# Patient Record
Sex: FEMALE | Race: WHITE | NOT HISPANIC OR LATINO | Employment: FULL TIME | URBAN - METROPOLITAN AREA
[De-identification: names, ages, dates, MRNs, and addresses within clinical notes are randomized per-mention and may not be internally consistent; named-entity substitution may affect disease eponyms.]

---

## 2017-06-15 ENCOUNTER — EH NON-PROVIDER (OUTPATIENT)
Dept: OCCUPATIONAL MEDICINE | Facility: CLINIC | Age: 27
End: 2017-06-15

## 2017-06-15 ENCOUNTER — HOSPITAL ENCOUNTER (OUTPATIENT)
Facility: MEDICAL CENTER | Age: 27
End: 2017-06-15
Attending: PREVENTIVE MEDICINE
Payer: COMMERCIAL

## 2017-06-15 ENCOUNTER — EMPLOYEE HEALTH (OUTPATIENT)
Dept: OCCUPATIONAL MEDICINE | Facility: CLINIC | Age: 27
End: 2017-06-15

## 2017-06-15 VITALS
DIASTOLIC BLOOD PRESSURE: 72 MMHG | HEIGHT: 67 IN | TEMPERATURE: 98.1 F | WEIGHT: 131 LBS | OXYGEN SATURATION: 98 % | SYSTOLIC BLOOD PRESSURE: 114 MMHG | RESPIRATION RATE: 12 BRPM | HEART RATE: 112 BPM | BODY MASS INDEX: 20.56 KG/M2

## 2017-06-15 DIAGNOSIS — Z02.1 ENCOUNTER FOR PRE-EMPLOYMENT HEALTH SCREENING EXAMINATION: ICD-10-CM

## 2017-06-15 DIAGNOSIS — Z02.1 PRE-EMPLOYMENT DRUG SCREENING: ICD-10-CM

## 2017-06-15 LAB
AMP AMPHETAMINE: NORMAL
BAR BARBITURATES: NORMAL
BZO BENZODIAZEPINES: NORMAL
COC COCAINE: NORMAL
INT CON NEG: NORMAL
INT CON POS: NORMAL
MDMA ECSTASY: NORMAL
MET METHAMPHETAMINES: NORMAL
MTD METHADONE: NORMAL
OPI OPIATES: NORMAL
OXY OXYCODONE: NORMAL
PCP PHENCYCLIDINE: NORMAL
POC URINE DRUG SCREEN OCDRS: NORMAL
THC: NORMAL

## 2017-06-15 PROCEDURE — 86480 TB TEST CELL IMMUN MEASURE: CPT | Performed by: PREVENTIVE MEDICINE

## 2017-06-15 PROCEDURE — 80305 DRUG TEST PRSMV DIR OPT OBS: CPT | Performed by: PREVENTIVE MEDICINE

## 2017-06-15 PROCEDURE — 8915 PR COMPREHENSIVE PHYSICAL: Performed by: PREVENTIVE MEDICINE

## 2017-06-15 PROCEDURE — 94375 RESPIRATORY FLOW VOLUME LOOP: CPT | Performed by: PREVENTIVE MEDICINE

## 2017-06-15 NOTE — PROGRESS NOTES
Patient was seen today for Pre-employment Clearance. Kevin blood for Quantiferon. Pt brought in docs of MMR, VzV, and Hep B immunity. Mask Fitting wasd done. Physical Cleared.

## 2017-06-15 NOTE — MR AVS SNAPSHOT
"        Elzbieta Rao   6/15/2017 2:30 PM    Non-Provider   MRN: [id number]    Department:  [facility]   Dept Phone:  [phone]    Description:  Female : 1990   Provider:  [facility] AGNIESZKA ALLEN RCONSUELO           Reason for Visit     Other [facility] Pre-emloyment       Allergies as of 6/15/2017     Not on File      You were diagnosed with     Encounter for pre-employment health screening examination   [091190]       Pre-employment drug screening   [809267]         Vital Signs     Blood Pressure Pulse Temperature Respirations Height Weight    114/72 mmHg 112 36.7 °C (98.1 °F) 12 1.702 m (5' 7\") 59.421 kg (131 lb)    Body Mass Index Oxygen Saturation Breastfeeding?             20.51 kg/m2 98% No         Basic Information     Date Of Birth Sex Race Ethnicity Preferred Language    1990 Female White Non- English      Health Maintenance     Patient has no pending health maintenance at this time      Results     POCT 11 Panel Urine Drug Screen      Component    AMPHETAMINE    COCAINE    POC THC    METHAMPHETAMINES    OPIATES    PHENCYCLIDINE    BENZODIAZIPINES    BARBITURATES    METHADONE    MDMA Ecstasy    OXYCODONE    Urine Drug Screen    NEG    Internal Control Positive    Valid    Internal Control Negative    Valid                        Current Immunizations     Hepatitis A Vaccine, Adult 2011, 2008    MMR Vaccine 6/15/2006, 2006    Tdap Vaccine 2010      Below and/or attached are the medications your provider expects you to take. Review all of your home medications and newly ordered medications with your provider and/or pharmacist. Follow medication instructions as directed by your provider and/or pharmacist. Please keep your medication list with you and share with your provider. Update the information when medications are discontinued, doses are changed, or new medications (including over-the-counter products) are added; and carry medication information at all times in the " event of emergency situations     Allergies:  (Not on file)          Medications  Valid as of: Harriet 15, 2017 -  3:16 PM    Generic Name Brand Name Tablet Size Instructions for use    .                 Medicines prescribed today were sent to:     None      Medication refill instructions:       If your prescription bottle indicates you have medication refills left, it is not necessary to call your provider’s office. Please contact your pharmacy and they will refill your medication.    If your prescription bottle indicates you do not have any refills left, you may request refills at any time through one of the following ways: The online Actionality system (except Urgent Care), by calling your provider’s office, or by asking your pharmacy to contact your provider’s office with a refill request. Medication refills are processed only during regular business hours and may not be available until the next business day. Your provider may request additional information or to have a follow-up visit with you prior to refilling your medication.   *Please Note: Medication refills are assigned a new Rx number when refilled electronically. Your pharmacy may indicate that no refills were authorized even though a new prescription for the same medication is available at the pharmacy. Please request the medicine by name with the pharmacy before contacting your provider for a refill.        Your To Do List     Future Labs/Procedures Complete By Expires    QuantiFERON-TB Gold [TB TEST CELL IMM MEASURE AG]  As directed 6/15/2018         Actionality Access Code: [email or web address]  Expires: 7/15/2017  3:16 PM    Actionality  A secure, online tool to manage your health information     [employer]’s Actionality® is a secure, online tool that connects you to your personalized health information from the privacy of your home -- day or night - making it very easy for you to manage your healthcare. Once the activation process is completed, you can even access  your medical information using the BioAmber juan, which is available for free in the Apple Juan store or Google Play store.     BioAmber provides the following levels of access (as shown below):   My Chart Features   [facility] Doctor [facility]  Specialists [facility]  Urgent  Care Non-[facility]  Primary Care  Doctor   Email your healthcare team securely and privately 24/7 X X X    Manage appointments: schedule your next appointment; view details of past/upcoming appointments X      Request prescription refills. X      View recent personal medical records, including lab and immunizations X X X X   View health record, including health history, allergies, medications X X X X   Read reports about your outpatient visits, procedures, consult and ER notes X X X X   See your discharge summary, which is a recap of your hospital and/or ER visit that includes your diagnosis, lab results, and care plan. X X       How to register for BioAmber:  1. Go to  [email or web address].  2. Click on the Sign Up Now box, which takes you to the New Member Sign Up page. You will need to provide the following information:  a. Enter your BioAmber Access Code exactly as it appears at the top of this page. (You will not need to use this code after you’ve completed the sign-up process. If you do not sign up before the expiration date, you must request a new code.)   b. Enter your date of birth.   c. Enter your home email address.   d. Click Submit, and follow the next screen’s instructions.  3. Create a BioAmber ID. This will be your BioAmber login ID and cannot be changed, so think of one that is secure and easy to remember.  4. Create a BioAmber password. You can change your password at any time.  5. Enter your Password Reset Question and Answer. This can be used at a later time if you forget your password.   6. Enter your e-mail address. This allows you to receive e-mail notifications when new information is available in BioAmber.  7. Click Sign  Up. You can now view your health information.    For assistance activating your AdVolume account, call [phone]

## 2017-06-15 NOTE — MR AVS SNAPSHOT
Elzbieta Rao   6/15/2017 3:10 PM   Employee Health   MRN: [id number]    Department:  [facility]   Dept Phone:  [phone]    Description:  Female : 1990   Provider:  Luther Najera M.D.           Reason for Visit     Other Physical Room 1       Allergies as of 6/15/2017     Not on File      You were diagnosed with     Encounter for pre-employment health screening examination   [[id number]]         Basic Information     Date Of Birth Sex Race Ethnicity Preferred Language    1990 Female White Non- English      Health Maintenance     Patient has no pending health maintenance at this time      Current Immunizations     Hepatitis A Vaccine, Adult 2011, 2008    MMR Vaccine 6/15/2006, 2006    Tdap Vaccine 2010      Below and/or attached are the medications your provider expects you to take. Review all of your home medications and newly ordered medications with your provider and/or pharmacist. Follow medication instructions as directed by your provider and/or pharmacist. Please keep your medication list with you and share with your provider. Update the information when medications are discontinued, doses are changed, or new medications (including over-the-counter products) are added; and carry medication information at all times in the event of emergency situations     Allergies:  No Known Allergies          Medications  Valid as of: Harriet 15, 2017 -  3:36 PM    Generic Name Brand Name Tablet Size Instructions for use    .                 Medicines prescribed today were sent to:     None      Medication refill instructions:       If your prescription bottle indicates you have medication refills left, it is not necessary to call your provider’s office. Please contact your pharmacy and they will refill your medication.    If your prescription bottle indicates you do not have any refills left, you may request refills at any time through one of the following ways: The  online Gaia Metrics system (except Urgent Care), by calling your provider’s office, or by asking your pharmacy to contact your provider’s office with a refill request. Medication refills are processed only during regular business hours and may not be available until the next business day. Your provider may request additional information or to have a follow-up visit with you prior to refilling your medication.   *Please Note: Medication refills are assigned a new Rx number when refilled electronically. Your pharmacy may indicate that no refills were authorized even though a new prescription for the same medication is available at the pharmacy. Please request the medicine by name with the pharmacy before contacting your provider for a refill.           Gaia Metrics Access Code: [email or web address]  Expires: 7/15/2017  3:16 PM    Gaia Metrics  A secure, online tool to manage your health information     [employer]’s Gaia Metrics® is a secure, online tool that connects you to your personalized health information from the privacy of your home -- day or night - making it very easy for you to manage your healthcare. Once the activation process is completed, you can even access your medical information using the Gaia Metrics juan, which is available for free in the Apple Juan store or Google Play store.     Gaia Metrics provides the following levels of access (as shown below):   My Chart Features   [facility] Doctor [facility]  Specialists [facility]  Urgent  Care Non-[facility]  Primary Care  Doctor   Email your healthcare team securely and privately 24/7 X X X    Manage appointments: schedule your next appointment; view details of past/upcoming appointments X      Request prescription refills. X      View recent personal medical records, including lab and immunizations X X X X   View health record, including health history, allergies, medications X X X X   Read reports about your outpatient visits, procedures, consult and ER notes X X X X   See your  discharge summary, which is a recap of your hospital and/or ER visit that includes your diagnosis, lab results, and care plan. X X       How to register for Aava Mobile:  1. Go to  [email or web address].  2. Click on the Sign Up Now box, which takes you to the New Member Sign Up page. You will need to provide the following information:  a. Enter your Aava Mobile Access Code exactly as it appears at the top of this page. (You will not need to use this code after you’ve completed the sign-up process. If you do not sign up before the expiration date, you must request a new code.)   b. Enter your date of birth.   c. Enter your home email address.   d. Click Submit, and follow the next screen’s instructions.  3. Create a Aava Mobile ID. This will be your Aava Mobile login ID and cannot be changed, so think of one that is secure and easy to remember.  4. Create a Aava Mobile password. You can change your password at any time.  5. Enter your Password Reset Question and Answer. This can be used at a later time if you forget your password.   6. Enter your e-mail address. This allows you to receive e-mail notifications when new information is available in Aava Mobile.  7. Click Sign Up. You can now view your health information.    For assistance activating your Aava Mobile account, call [phone]

## 2017-06-16 LAB
M TB TUBERC IFN-G BLD QL: NEGATIVE
M TB TUBERC IFN-G/MITOGEN IGNF BLD: -0.01
M TB TUBERC IGNF/MITOGEN IGNF CONTROL: 58.84 [IU]/ML
MITOGEN IGNF BCKGRD COR BLD-ACNC: 0.08 [IU]/ML

## 2017-07-22 ENCOUNTER — HOSPITAL ENCOUNTER (OUTPATIENT)
Dept: LAB | Facility: MEDICAL CENTER | Age: 27
End: 2017-07-22
Payer: COMMERCIAL

## 2017-07-22 LAB
BDY FAT % MEASURED: 19.2 %
BP DIAS: 88 MMHG
BP SYS: 117 MMHG
CHOLEST SERPL-MCNC: 162 MG/DL (ref 100–199)
DIABETES HTDIA: NO
EVENT NAME HTEVT: NORMAL
FASTING HTFAS: YES
GLUCOSE SERPL-MCNC: 72 MG/DL (ref 65–99)
HDLC SERPL-MCNC: 67 MG/DL
HIP CIRCUMFERENCE HTHC: ABNORMAL IN
HYPERTENSION HTHYP: NO
LDLC SERPL CALC-MCNC: 86 MG/DL
SCREENING LOC CITY HTCIT: NORMAL
SCREENING LOC STATE HTSTA: NORMAL
SCREENING LOCATION HTLOC: NORMAL
SMOKING HTSMO: NO
SUBSCRIBER ID HTSID: NORMAL
TRIGL SERPL-MCNC: 46 MG/DL (ref 0–149)
WAIST CIRCUMFERENCE HTWC: ABNORMAL IN

## 2017-09-20 ENCOUNTER — TELEPHONE (OUTPATIENT)
Dept: URGENT CARE | Facility: CLINIC | Age: 27
End: 2017-09-20

## 2017-09-28 ENCOUNTER — EH NON-PROVIDER (OUTPATIENT)
Dept: OCCUPATIONAL MEDICINE | Facility: CLINIC | Age: 27
End: 2017-09-28

## 2017-09-28 DIAGNOSIS — Z29.89 NEED FOR ISOLATION: ICD-10-CM

## 2017-09-28 PROCEDURE — 94375 RESPIRATORY FLOW VOLUME LOOP: CPT

## 2017-10-05 ENCOUNTER — OFFICE VISIT (OUTPATIENT)
Dept: URGENT CARE | Facility: CLINIC | Age: 27
End: 2017-10-05
Payer: COMMERCIAL

## 2017-10-05 VITALS
WEIGHT: 131 LBS | HEART RATE: 71 BPM | TEMPERATURE: 98.7 F | SYSTOLIC BLOOD PRESSURE: 122 MMHG | RESPIRATION RATE: 15 BRPM | HEIGHT: 67 IN | BODY MASS INDEX: 20.56 KG/M2 | DIASTOLIC BLOOD PRESSURE: 62 MMHG | OXYGEN SATURATION: 98 %

## 2017-10-05 DIAGNOSIS — N76.0 ACUTE VAGINITIS: ICD-10-CM

## 2017-10-05 LAB
APPEARANCE UR: CLEAR
BILIRUB UR STRIP-MCNC: NORMAL MG/DL
COLOR UR AUTO: YELLOW
GLUCOSE UR STRIP.AUTO-MCNC: NORMAL MG/DL
INT CON NEG: NEGATIVE
INT CON POS: POSITIVE
KETONES UR STRIP.AUTO-MCNC: NORMAL MG/DL
LEUKOCYTE ESTERASE UR QL STRIP.AUTO: NORMAL
NITRITE UR QL STRIP.AUTO: NORMAL
PH UR STRIP.AUTO: 7 [PH] (ref 5–8)
POC URINE PREGNANCY TEST: NORMAL
PROT UR QL STRIP: NORMAL MG/DL
RBC UR QL AUTO: NORMAL
SP GR UR STRIP.AUTO: 1.01
UROBILINOGEN UR STRIP-MCNC: NORMAL MG/DL

## 2017-10-05 PROCEDURE — 81025 URINE PREGNANCY TEST: CPT | Performed by: PHYSICIAN ASSISTANT

## 2017-10-05 PROCEDURE — 99204 OFFICE O/P NEW MOD 45 MIN: CPT | Performed by: PHYSICIAN ASSISTANT

## 2017-10-05 PROCEDURE — 81002 URINALYSIS NONAUTO W/O SCOPE: CPT | Performed by: PHYSICIAN ASSISTANT

## 2017-10-05 RX ORDER — FLUCONAZOLE 150 MG/1
TABLET ORAL
Qty: 2 TAB | Refills: 0 | Status: SHIPPED | OUTPATIENT
Start: 2017-10-05 | End: 2017-11-10

## 2017-10-05 ASSESSMENT — ENCOUNTER SYMPTOMS
BACK PAIN: 0
FEVER: 0
CHILLS: 0
ABDOMINAL PAIN: 0
NAUSEA: 0
VAGINITIS: 1
VOMITING: 0
FLANK PAIN: 0

## 2017-10-06 NOTE — PROGRESS NOTES
"Subjective:      Elzbieta Rao is a 27 y.o. female who presents with Vaginitis (x 2 weeks, would like diflucan)            Vaginitis   The patient's primary symptoms include genital itching and vaginal discharge. The patient's pertinent negatives include no genital lesions, genital odor, genital rash, missed menses, pelvic pain or vaginal bleeding. This is a new problem. Episode onset: 2 weeks  The problem occurs constantly. The problem has been unchanged. Pain severity now: \" mild irritation\" She is not pregnant. Pertinent negatives include no abdominal pain, back pain, chills, discolored urine, dysuria, fever, flank pain, frequency, hematuria, nausea, painful intercourse, urgency or vomiting. The vaginal discharge was white and thick. There has been no bleeding. Nothing aggravates the symptoms. She has tried antifungals (OTC Monistat) for the symptoms. She is not sexually active. Her menstrual history has been regular. (Patient has history of vaginitis- reports similar symptoms in the past)       No past medical history on file.  No past surgical history on file.    History reviewed. No pertinent family history.    No Known Allergies      Medications, Allergies, and current problem list reviewed today in Epic    Review of Systems   Constitutional: Negative for chills, fever and malaise/fatigue.   Gastrointestinal: Negative for abdominal pain, nausea and vomiting.   Genitourinary: Positive for vaginal discharge. Negative for dysuria, flank pain, frequency, hematuria, missed menses, pelvic pain and urgency.   Musculoskeletal: Negative for back pain.     All other systems reviewed and are negative.        Objective:     /62   Pulse 71   Temp 37.1 °C (98.7 °F)   Resp 15   Ht 1.702 m (5' 7\")   Wt 59.4 kg (131 lb)   SpO2 98%   Breastfeeding? No   BMI 20.52 kg/m²      Physical Exam   Constitutional: She is oriented to person, place, and time. She appears well-developed and well-nourished. No " distress.   HENT:   Head: Normocephalic and atraumatic.   Eyes: Conjunctivae are normal.   Cardiovascular: Normal rate, regular rhythm and normal heart sounds.  Exam reveals no gallop and no friction rub.    No murmur heard.  Pulmonary/Chest: Effort normal and breath sounds normal. No respiratory distress. She has no wheezes. She has no rales.   Abdominal: Soft. Bowel sounds are normal. She exhibits no distension and no mass. There is no tenderness. There is no rebound and no guarding.   Genitourinary:   Genitourinary Comments: Patient deferred   Neurological: She is alert and oriented to person, place, and time. No cranial nerve deficit.   Psychiatric: She has a normal mood and affect. Her behavior is normal. Judgment and thought content normal.               Assessment/Plan:     1. Acute vaginitis    - fluconazole (DIFLUCAN) 150 MG tablet; Take one tab once. May repeat in 72 hours if symptoms persist.  Dispense: 2 Tab; Refill: 0    RTC if symptoms do not improve- consider pelvic and swabs at that time.     Differential diagnoses, Supportive care, and indications for immediate follow-up discussed with patient.   Instructed to return to clinic or nearest emergency department for any change in condition, further concerns, or worsening of symptoms.    The patient demonstrated a good understanding and agreed with the treatment plan.    Kimberly Ware P.A.-C.

## 2017-10-19 ENCOUNTER — IMMUNIZATION (OUTPATIENT)
Dept: OCCUPATIONAL MEDICINE | Facility: CLINIC | Age: 27
End: 2017-10-19

## 2017-10-19 DIAGNOSIS — Z23 NEED FOR VACCINATION: ICD-10-CM

## 2017-10-19 PROCEDURE — 90686 IIV4 VACC NO PRSV 0.5 ML IM: CPT | Performed by: PREVENTIVE MEDICINE

## 2017-11-10 ENCOUNTER — OFFICE VISIT (OUTPATIENT)
Dept: URGENT CARE | Facility: CLINIC | Age: 27
End: 2017-11-10
Payer: COMMERCIAL

## 2017-11-10 ENCOUNTER — HOSPITAL ENCOUNTER (OUTPATIENT)
Facility: MEDICAL CENTER | Age: 27
End: 2017-11-10
Attending: PHYSICIAN ASSISTANT
Payer: COMMERCIAL

## 2017-11-10 VITALS
OXYGEN SATURATION: 97 % | TEMPERATURE: 97.8 F | DIASTOLIC BLOOD PRESSURE: 76 MMHG | WEIGHT: 131 LBS | SYSTOLIC BLOOD PRESSURE: 120 MMHG | HEART RATE: 78 BPM | RESPIRATION RATE: 14 BRPM | BODY MASS INDEX: 20.56 KG/M2 | HEIGHT: 67 IN

## 2017-11-10 DIAGNOSIS — N76.0 ACUTE VAGINITIS: Primary | ICD-10-CM

## 2017-11-10 LAB
APPEARANCE UR: NORMAL
BILIRUB UR STRIP-MCNC: NORMAL MG/DL
COLOR UR AUTO: YELLOW
GLUCOSE UR STRIP.AUTO-MCNC: NORMAL MG/DL
KETONES UR STRIP.AUTO-MCNC: NORMAL MG/DL
LEUKOCYTE ESTERASE UR QL STRIP.AUTO: NORMAL
NITRITE UR QL STRIP.AUTO: NORMAL
PH UR STRIP.AUTO: 6 [PH] (ref 5–8)
PROT UR QL STRIP: NORMAL MG/DL
RBC UR QL AUTO: NORMAL
SP GR UR STRIP.AUTO: 1.02
UROBILINOGEN UR STRIP-MCNC: NORMAL MG/DL

## 2017-11-10 PROCEDURE — 87086 URINE CULTURE/COLONY COUNT: CPT

## 2017-11-10 PROCEDURE — 87480 CANDIDA DNA DIR PROBE: CPT

## 2017-11-10 PROCEDURE — 87591 N.GONORRHOEAE DNA AMP PROB: CPT

## 2017-11-10 PROCEDURE — 87510 GARDNER VAG DNA DIR PROBE: CPT

## 2017-11-10 PROCEDURE — 87491 CHLMYD TRACH DNA AMP PROBE: CPT

## 2017-11-10 PROCEDURE — 87660 TRICHOMONAS VAGIN DIR PROBE: CPT

## 2017-11-10 PROCEDURE — 81002 URINALYSIS NONAUTO W/O SCOPE: CPT | Performed by: PHYSICIAN ASSISTANT

## 2017-11-10 PROCEDURE — 99214 OFFICE O/P EST MOD 30 MIN: CPT | Performed by: PHYSICIAN ASSISTANT

## 2017-11-10 RX ORDER — FLUCONAZOLE 200 MG/1
TABLET ORAL
Qty: 3 TAB | Refills: 0 | Status: SHIPPED | OUTPATIENT
Start: 2017-11-10 | End: 2018-05-31

## 2017-11-10 NOTE — PROGRESS NOTES
Subjective:      Pt is a 27 y.o. female who presents with Vaginitis            HPI  Pt notes continuing vaginitis after being seen 2 weeks ago in the UC for the same symptoms. She notes Boric acid and Diflucan as well as Monistat have not worked. She has not had vaginal pathogens swabs performed. Pt feels it is a yeast infection but is open to the idea of testing this time around. Pt has not taken any Rx medications for this condition. Pt states the pain is a 5/10 with vaginal itching, aching in nature and worse at night. Pt denies CP, SOB, NVD, paresthesias, headaches, dizziness, change in vision, hives, or other joint pain. The pt's medication list, problem list, and allergies have been evaluated and reviewed during today's visit.    PMH:  Negative per pt.      PSH:  Negative per pt.      Fam Hx:  the patient's family history is not pertinent to their current complaint    Soc HX:  Social History     Social History   • Marital status: Single     Spouse name: N/A   • Number of children: N/A   • Years of education: N/A     Occupational History   • Not on file.     Social History Main Topics   • Smoking status: Never Smoker   • Smokeless tobacco: Never Used   • Alcohol use Not on file   • Drug use: Unknown   • Sexual activity: Not on file     Other Topics Concern   • Not on file     Social History Narrative   • No narrative on file         Medications:    Current Outpatient Prescriptions:   •  fluconazole (DIFLUCAN) 200 MG Tab, Take one tab po every other day for 3 doses or until symptoms resolve, Disp: 3 Tab, Rfl: 0      Allergies:  Review of patient's allergies indicates no known allergies.    ROS  Review of Systems   Constitutional: Negative for fever, chills and malaise/fatigue.   HENT: Negative for congestion and sore throat.    Eyes: Negative for blurred vision, double vision and photophobia.   Respiratory: Negative for cough and shortness of breath.    Cardiovascular: Negative for chest pain and palpitations.  "  Gastrointestinal: Negative for nausea, vomiting, abdominal pain, diarrhea and constipation.   Genitourinary: Positive for vagintis. Negative for hematuria and flank pain.   Musculoskeletal: Negative for joint pain and myalgias.   Skin: Negative for rash.   Neurological: Negative for dizziness, tingling and headaches.   Endo/Heme/Allergies: Does not bruise/bleed easily.   Psychiatric/Behavioral: Negative for depression. The patient is not nervous/anxious.         Objective:     /76   Pulse 78   Temp 36.6 °C (97.8 °F)   Resp 14   Ht 1.702 m (5' 7\")   Wt 59.4 kg (131 lb)   SpO2 97%   BMI 20.52 kg/m²      Physical Exam      Physical Exam   Constitutional: She is oriented to person, place, and time. She appears well-developed and well-nourished. No distress.   HENT:   Head: Normocephalic and atraumatic.   Right Ear: External ear normal.   Left Ear: External ear normal.   Nose: Nose normal.   Mouth/Throat: Oropharynx is clear and moist. No oropharyngeal exudate.   Eyes: Conjunctivae normal and EOM are normal. Pupils are equal, round, and reactive to light.   Neck: Normal range of motion. Neck supple. No thyromegaly present.   Cardiovascular: Normal rate, regular rhythm, normal heart sounds and intact distal pulses.  Exam reveals no gallop and no friction rub.    No murmur heard.  Pulmonary/Chest: Effort normal and breath sounds normal. No respiratory distress. She has no wheezes. She has no rales. She exhibits no tenderness.   Abdominal: Soft. Bowel sounds are normal. She exhibits no distension and no mass. There is no tenderness. There is no rebound and no guarding.   Genitourinary:        Pt deferred   Musculoskeletal: Normal range of motion. She exhibits no edema and no tenderness.   Lymphadenopathy:     She has no cervical adenopathy.   Neurological: She is alert and oriented to person, place, and time. She has normal reflexes. No cranial nerve deficit.   Skin: Skin is warm and dry. No rash noted. No " erythema.   Psychiatric: She has a normal mood and affect. Her behavior is normal. Judgment and thought content normal.          Assessment/Plan:     1. Acute vaginitis    - POCT Urinalysis-->Hemo blood (pt finishing menses for the month)  - URINE CULTURE(NEW); Future  - VAGINAL PATHOGENS DNA PANEL; Future  - CHLAMYDIA/GC PCR URINE OR SWAB; Future  - fluconazole (DIFLUCAN) 200 MG Tab; Take one tab po every other day for 3 doses or until symptoms resolve  Dispense: 3 Tab; Refill: 0    Rest, fluids encouraged.  AVS with medical info given.  Pt was in full understanding and agreement with the plan.  Follow-up as needed if symptoms worsen or fail to improve.

## 2017-11-10 NOTE — PATIENT INSTRUCTIONS
Bacterial Vaginosis  Bacterial vaginosis is a vaginal infection that occurs when the normal balance of bacteria in the vagina is disrupted. It results from an overgrowth of certain bacteria. This is the most common vaginal infection in women of childbearing age. Treatment is important to prevent complications, especially in pregnant women, as it can cause a premature delivery.  CAUSES   Bacterial vaginosis is caused by an increase in harmful bacteria that are normally present in smaller amounts in the vagina. Several different kinds of bacteria can cause bacterial vaginosis. However, the reason that the condition develops is not fully understood.  RISK FACTORS  Certain activities or behaviors can put you at an increased risk of developing bacterial vaginosis, including:  · Having a new sex partner or multiple sex partners.  · Douching.  · Using an intrauterine device (IUD) for contraception.  Women do not get bacterial vaginosis from toilet seats, bedding, swimming pools, or contact with objects around them.  SIGNS AND SYMPTOMS   Some women with bacterial vaginosis have no signs or symptoms. Common symptoms include:  · Grey vaginal discharge.  · A fishlike odor with discharge, especially after sexual intercourse.  · Itching or burning of the vagina and vulva.  · Burning or pain with urination.  DIAGNOSIS   Your health care provider will take a medical history and examine the vagina for signs of bacterial vaginosis. A sample of vaginal fluid may be taken. Your health care provider will look at this sample under a microscope to check for bacteria and abnormal cells. A vaginal pH test may also be done.   TREATMENT   Bacterial vaginosis may be treated with antibiotic medicines. These may be given in the form of a pill or a vaginal cream. A second round of antibiotics may be prescribed if the condition comes back after treatment. Because bacterial vaginosis increases your risk for sexually transmitted diseases, getting  treated can help reduce your risk for chlamydia, gonorrhea, HIV, and herpes.  HOME CARE INSTRUCTIONS   · Only take over-the-counter or prescription medicines as directed by your health care provider.  · If antibiotic medicine was prescribed, take it as directed. Make sure you finish it even if you start to feel better.  · Tell all sexual partners that you have a vaginal infection. They should see their health care provider and be treated if they have problems, such as a mild rash or itching.  · During treatment, it is important that you follow these instructions:  ¨ Avoid sexual activity or use condoms correctly.  ¨ Do not douche.  ¨ Avoid alcohol as directed by your health care provider.  ¨ Avoid breastfeeding as directed by your health care provider.  SEEK MEDICAL CARE IF:   · Your symptoms are not improving after 3 days of treatment.  · You have increased discharge or pain.  · You have a fever.  MAKE SURE YOU:   · Understand these instructions.  · Will watch your condition.  · Will get help right away if you are not doing well or get worse.  FOR MORE INFORMATION   Centers for Disease Control and Prevention, Division of STD Prevention: www.cdc.gov/std  American Sexual Health Association (REGLA): www.ashastd.org      This information is not intended to replace advice given to you by your health care provider. Make sure you discuss any questions you have with your health care provider.     Document Released: 12/18/2006 Document Revised: 01/08/2016 Document Reviewed: 07/30/2014  [employer] Interactive Patient Education ©2016 [employer]

## 2017-11-11 DIAGNOSIS — N76.0 ACUTE VAGINITIS: ICD-10-CM

## 2017-11-11 LAB
C TRACH DNA SPEC QL NAA+PROBE: NEGATIVE
CANDIDA DNA VAG QL PROBE+SIG AMP: NEGATIVE
G VAGINALIS DNA VAG QL PROBE+SIG AMP: POSITIVE
N GONORRHOEA DNA SPEC QL NAA+PROBE: NEGATIVE
SPECIMEN SOURCE: NORMAL
T VAGINALIS DNA VAG QL PROBE+SIG AMP: NEGATIVE

## 2017-11-13 LAB
BACTERIA UR CULT: NORMAL
SIGNIFICANT IND 70042: NORMAL
SOURCE SOURCE: NORMAL

## 2017-11-14 ENCOUNTER — TELEPHONE (OUTPATIENT)
Dept: URGENT CARE | Facility: CLINIC | Age: 27
End: 2017-11-14

## 2017-11-14 DIAGNOSIS — B96.89 BV (BACTERIAL VAGINOSIS): ICD-10-CM

## 2017-11-14 DIAGNOSIS — N76.0 BV (BACTERIAL VAGINOSIS): ICD-10-CM

## 2017-11-14 RX ORDER — METRONIDAZOLE 500 MG/1
500 TABLET ORAL EVERY 8 HOURS
Qty: 30 TAB | Refills: 0 | Status: SHIPPED | OUTPATIENT
Start: 2017-11-14 | End: 2017-11-24

## 2017-11-14 NOTE — TELEPHONE ENCOUNTER
Called and left message with pt about vaginal culture results which came back positive for Gardnerella.  Called in Flagyl for the pt.   Encouraged Pt to call back with questions.  Jarod Thakkar PA-C

## 2017-11-27 ENCOUNTER — TELEPHONE (OUTPATIENT)
Dept: URGENT CARE | Facility: PHYSICIAN GROUP | Age: 27
End: 2017-11-27

## 2017-11-27 NOTE — TELEPHONE ENCOUNTER
Called and left message regarding pt's question about the lab results. Pt wanted to double check about candida which was NEG. Only positive for Gardnerella and pt on treatment.  Jarod Thakkar PA-C

## 2017-12-01 ENCOUNTER — TELEPHONE (OUTPATIENT)
Dept: URGENT CARE | Facility: CLINIC | Age: 27
End: 2017-12-01

## 2017-12-01 DIAGNOSIS — B96.89 BV (BACTERIAL VAGINOSIS): ICD-10-CM

## 2017-12-01 DIAGNOSIS — N76.0 BV (BACTERIAL VAGINOSIS): ICD-10-CM

## 2017-12-01 RX ORDER — CLINDAMYCIN HYDROCHLORIDE 300 MG/1
300 CAPSULE ORAL 3 TIMES DAILY
Qty: 21 CAP | Refills: 0 | Status: SHIPPED | OUTPATIENT
Start: 2017-12-01 | End: 2017-12-08

## 2017-12-01 NOTE — TELEPHONE ENCOUNTER
Left another message on pt's VM. PT notes continuing BV symptoms after flagyl. Wishes for clindamycin.  Pt to come into UC if this does not work.  Jarod Thakkar PA-C

## 2018-05-31 ENCOUNTER — HOSPITAL ENCOUNTER (OUTPATIENT)
Facility: MEDICAL CENTER | Age: 28
End: 2018-05-31
Attending: NURSE PRACTITIONER
Payer: COMMERCIAL

## 2018-05-31 ENCOUNTER — OFFICE VISIT (OUTPATIENT)
Dept: URGENT CARE | Facility: CLINIC | Age: 28
End: 2018-05-31
Payer: COMMERCIAL

## 2018-05-31 VITALS
HEART RATE: 82 BPM | WEIGHT: 133 LBS | RESPIRATION RATE: 16 BRPM | BODY MASS INDEX: 20.88 KG/M2 | DIASTOLIC BLOOD PRESSURE: 72 MMHG | SYSTOLIC BLOOD PRESSURE: 118 MMHG | OXYGEN SATURATION: 100 % | HEIGHT: 67 IN | TEMPERATURE: 98.3 F

## 2018-05-31 DIAGNOSIS — N76.4 VULVAR ABSCESS: ICD-10-CM

## 2018-05-31 DIAGNOSIS — L02.214 ABSCESS OF RIGHT GROIN: ICD-10-CM

## 2018-05-31 PROCEDURE — 87075 CULTR BACTERIA EXCEPT BLOOD: CPT

## 2018-05-31 PROCEDURE — 87077 CULTURE AEROBIC IDENTIFY: CPT

## 2018-05-31 PROCEDURE — 87186 SC STD MICRODIL/AGAR DIL: CPT

## 2018-05-31 PROCEDURE — 87205 SMEAR GRAM STAIN: CPT

## 2018-05-31 PROCEDURE — 87070 CULTURE OTHR SPECIMN AEROBIC: CPT

## 2018-05-31 PROCEDURE — 10060 I&D ABSCESS SIMPLE/SINGLE: CPT | Performed by: NURSE PRACTITIONER

## 2018-05-31 RX ORDER — SULFAMETHOXAZOLE AND TRIMETHOPRIM 800; 160 MG/1; MG/1
1 TABLET ORAL 2 TIMES DAILY
Qty: 14 TAB | Refills: 0 | Status: SHIPPED | OUTPATIENT
Start: 2018-05-31 | End: 2018-06-07

## 2018-05-31 RX ORDER — FLUCONAZOLE 150 MG/1
150 TABLET ORAL DAILY
Qty: 1 TAB | Refills: 0 | Status: SHIPPED | OUTPATIENT
Start: 2018-05-31 | End: 2018-07-17

## 2018-05-31 ASSESSMENT — ENCOUNTER SYMPTOMS
FEVER: 0
BACK PAIN: 0
TINGLING: 0
CHILLS: 1

## 2018-06-01 NOTE — PROGRESS NOTES
"Subjective:      Elzbieta Rao is a 28 y.o. female who presents with Nodule (x4days two boils on pubic area, painful )          Denies past medical, surgical or family history that is significant to today's problem.   RX or OTC medications reviewed with patient today.   No Known Allergies    HPI this is a new problem. c/o pain in groin. She shaved a few weeks ago then went on vacation. She noticed a few small red bumps at the hair follicle . She then tried to tweeze the hair out. Now large red , swollen areas. Very sore . Pain was 7/10 last night. Cannot stand even a slight amount of pressure on them. Feeling chilled and possibly feverish. No other aggravating or alleviating factors.   No other aggravating or alleviating factors.       Review of Systems   Constitutional: Positive for chills. Negative for fever and malaise/fatigue.   Genitourinary: Negative for dysuria and frequency.   Musculoskeletal: Negative for back pain.   Neurological: Negative for tingling.          Objective:     /72   Pulse 82   Temp 36.8 °C (98.3 °F)   Resp 16   Ht 1.702 m (5' 7\")   Wt 60.3 kg (133 lb)   SpO2 100%   Breastfeeding? No   BMI 20.83 kg/m²      Physical Exam      Presents to UC today with a probable cutaneous abscess.    Location: vulva.    Onset: 4 days ago.  Progression since onset: gradually worsening  Associated symptoms: pain, chills.  Previous history of cutaneous abscesses?: no  Diabetes? no      OBJECTIVE:    There is are two areas characterized by a soft mobile subQ mass measuring about 2 cm in diameter, fluctuance, tenderness was present measuring 2 in greatest dimension - each lesion on right side of vulva/ groin.     Procedure: Incision and Drainage   -Risks, benefits, and alternatives discussed. Risks including infection, bleeding, nerve damage, and poor cosmetic outcome. Informed consent obtained.    -Sterile technique throughout   -The area was prepped in the usual manner and the skin " overlying the abscess was anesthetized with  3 cc of 2% plain lidocaine.  -Incision with #11 blade into fluctuant area with approx 3 cc purulent material expressed   -Culture obtained and packaged for lab   -Cavity probed and any loculations bluntly taken down with hemostat   -Irrigated copiously with NS   -Packing was inserted.  -Minimal bleeding with good hemostasis achieved   -The patient tolerated the procedure well      IMPRESSION: Cutaneous abscess.  PLAN: Apply hot compresses frequently to promote drainage.  Oral antibiotics -- see med orders.  I & D procedure performed as above.  RTC in 2 days or PRN.           Assessment/Plan:     1. Abscess of right groin    - sulfamethoxazole-trimethoprim (BACTRIM DS) 800-160 MG tablet; Take 1 Tab by mouth 2 times a day for 7 days.  Dispense: 14 Tab; Refill: 0  - fluconazole (DIFLUCAN) 150 MG tablet; Take 1 Tab by mouth every day. Take at onset of symptoms.  Dispense: 1 Tab; Refill: 0  - ANAEROBIC/AEROBIC/GRAM STAIN    2. Vulvar abscess    - ANAEROBIC/AEROBIC/GRAM STAIN

## 2018-06-02 ENCOUNTER — TELEPHONE (OUTPATIENT)
Dept: URGENT CARE | Facility: PHYSICIAN GROUP | Age: 28
End: 2018-06-02

## 2018-06-02 LAB
GRAM STN SPEC: NORMAL
SIGNIFICANT IND 70042: NORMAL
SITE SITE: NORMAL
SOURCE SOURCE: NORMAL

## 2018-06-03 LAB
BACTERIA WND AEROBE CULT: ABNORMAL
BACTERIA WND AEROBE CULT: ABNORMAL
GRAM STN SPEC: ABNORMAL
SIGNIFICANT IND 70042: ABNORMAL
SITE SITE: ABNORMAL
SOURCE SOURCE: ABNORMAL

## 2018-06-04 LAB
BACTERIA SPEC ANAEROBE CULT: NORMAL
SIGNIFICANT IND 70042: NORMAL
SITE SITE: NORMAL
SOURCE SOURCE: NORMAL

## 2018-06-06 ENCOUNTER — TELEPHONE (OUTPATIENT)
Dept: URGENT CARE | Facility: CLINIC | Age: 28
End: 2018-06-06

## 2018-07-17 ENCOUNTER — OFFICE VISIT (OUTPATIENT)
Dept: URGENT CARE | Facility: CLINIC | Age: 28
End: 2018-07-17
Payer: COMMERCIAL

## 2018-07-17 VITALS
WEIGHT: 137 LBS | SYSTOLIC BLOOD PRESSURE: 120 MMHG | DIASTOLIC BLOOD PRESSURE: 82 MMHG | TEMPERATURE: 99.8 F | BODY MASS INDEX: 21.5 KG/M2 | HEIGHT: 67 IN | HEART RATE: 78 BPM | OXYGEN SATURATION: 98 % | RESPIRATION RATE: 14 BRPM

## 2018-07-17 DIAGNOSIS — B00.1 RECURRENT COLD SORES: ICD-10-CM

## 2018-07-17 PROCEDURE — 99214 OFFICE O/P EST MOD 30 MIN: CPT | Performed by: NURSE PRACTITIONER

## 2018-07-17 RX ORDER — ACYCLOVIR 200 MG/1
200 CAPSULE ORAL
Qty: 25 CAP | Refills: 0 | Status: SHIPPED | OUTPATIENT
Start: 2018-07-17 | End: 2018-07-22

## 2018-07-17 RX ORDER — ACYCLOVIR 400 MG/1
400 TABLET ORAL 2 TIMES DAILY
Qty: 5 TAB | Refills: 0 | Status: CANCELLED | OUTPATIENT
Start: 2018-07-17

## 2018-07-17 ASSESSMENT — ENCOUNTER SYMPTOMS
VOMITING: 0
WEAKNESS: 0
NUMBNESS: 0
MYALGIAS: 0
DIZZINESS: 0
CHILLS: 0
EYE PAIN: 0
SHORTNESS OF BREATH: 0
NAUSEA: 0
SORE THROAT: 0
FEVER: 0

## 2018-07-17 ASSESSMENT — PATIENT HEALTH QUESTIONNAIRE - PHQ9: CLINICAL INTERPRETATION OF PHQ2 SCORE: 0

## 2018-07-17 NOTE — PROGRESS NOTES
Subjective:     Elzbieta Rao is a 28 y.o. female who presents for Cold Sores  Patient presents clinic today with complaints of cold sore outbreak. Patient has a history of recurrent cold sores in which she has been treated prophylactically with acyclovir in the past. Patient does not have any more of the medication at this time. Started with one sore and now multiple of the upper and lower lips prompting treatment.     Other   This is a new problem. The current episode started in the past 7 days. The problem occurs constantly. The problem has been gradually worsening. Pertinent negatives include no chest pain, chills, fever, myalgias, nausea, numbness, rash, sore throat, vomiting or weakness. Nothing aggravates the symptoms. She has tried nothing for the symptoms. The treatment provided no relief.   History reviewed. No pertinent past medical history.History reviewed. No pertinent surgical history.  Social History     Social History   • Marital status: Single     Spouse name: N/A   • Number of children: N/A   • Years of education: N/A     Occupational History   • Not on file.     Social History Main Topics   • Smoking status: Never Smoker   • Smokeless tobacco: Never Used   • Alcohol use Not on file   • Drug use: Unknown   • Sexual activity: Not on file     Other Topics Concern   • Not on file     Social History Narrative   • No narrative on file    History reviewed. No pertinent family history. Review of Systems   Constitutional: Negative for chills and fever.   HENT: Negative for sore throat.    Eyes: Negative for pain.   Respiratory: Negative for shortness of breath.    Cardiovascular: Negative for chest pain.   Gastrointestinal: Negative for nausea and vomiting.   Genitourinary: Negative for hematuria.   Musculoskeletal: Negative for myalgias.   Skin: Negative for rash.   Neurological: Negative for dizziness, weakness and numbness.   No Known Allergies   Objective:   /82   Pulse 78   Temp 37.7  "°C (99.8 °F)   Resp 14   Ht 1.702 m (5' 7\")   Wt 62.1 kg (137 lb)   SpO2 98%   BMI 21.46 kg/m²   Physical Exam   Constitutional: She is oriented to person, place, and time. She appears well-developed and well-nourished. No distress.   HENT:   Head: Normocephalic and atraumatic.   Eyes: Conjunctivae and EOM are normal. Pupils are equal, round, and reactive to light.   Cardiovascular: Normal rate and regular rhythm.    No murmur heard.  Pulmonary/Chest: Effort normal and breath sounds normal. No respiratory distress.   Abdominal: Soft. She exhibits no distension. There is no tenderness.   Neurological: She is alert and oriented to person, place, and time. She has normal reflexes. No sensory deficit.   Skin: Skin is warm and dry. Lesion and rash noted. Rash is vesicular.        Psychiatric: She has a normal mood and affect.         Assessment/Plan:   Assessment    1. Recurrent cold sores  - REFERRAL TO FAMILY PRACTICE  - acyclovir (ZOVIRAX) 200 MG Cap; Take 1 Cap by mouth 5 Times a Day for 5 days.  Dispense: 25 Cap; Refill: 0     Differential diagnosis, natural history, supportive care, and indications for immediate follow-up discussed.  "

## 2018-07-25 ENCOUNTER — HOSPITAL ENCOUNTER (OUTPATIENT)
Dept: LAB | Facility: MEDICAL CENTER | Age: 28
End: 2018-07-25
Payer: COMMERCIAL

## 2018-07-25 LAB
BDY FAT % MEASURED: 19.3 %
BP DIAS: 77 MMHG
BP SYS: 110 MMHG
CHOLEST SERPL-MCNC: 143 MG/DL (ref 100–199)
DIABETES HTDIA: NO
EVENT NAME HTEVT: NORMAL
FASTING HTFAS: YES
GLUCOSE SERPL-MCNC: 88 MG/DL (ref 65–99)
HDLC SERPL-MCNC: 72 MG/DL
HYPERTENSION HTHYP: NO
LDLC SERPL CALC-MCNC: 61 MG/DL
SCREENING LOC CITY HTCIT: NORMAL
SCREENING LOC STATE HTSTA: NORMAL
SCREENING LOCATION HTLOC: NORMAL
SMOKING HTSMO: NO
SUBSCRIBER ID HTSID: NORMAL
TRIGL SERPL-MCNC: 50 MG/DL (ref 0–149)

## 2018-07-25 PROCEDURE — S5190 WELLNESS ASSESSMENT BY NONPH: HCPCS

## 2018-07-25 PROCEDURE — 80061 LIPID PANEL: CPT

## 2018-07-25 PROCEDURE — 82947 ASSAY GLUCOSE BLOOD QUANT: CPT

## 2018-07-25 PROCEDURE — 36415 COLL VENOUS BLD VENIPUNCTURE: CPT

## 2018-08-30 ENCOUNTER — OFFICE VISIT (OUTPATIENT)
Dept: MEDICAL GROUP | Facility: MEDICAL CENTER | Age: 28
End: 2018-08-30
Payer: COMMERCIAL

## 2018-08-30 VITALS
HEART RATE: 74 BPM | WEIGHT: 134.04 LBS | HEIGHT: 67 IN | TEMPERATURE: 99 F | BODY MASS INDEX: 21.04 KG/M2 | SYSTOLIC BLOOD PRESSURE: 122 MMHG | OXYGEN SATURATION: 100 % | DIASTOLIC BLOOD PRESSURE: 88 MMHG

## 2018-08-30 DIAGNOSIS — Z00.00 WELLNESS EXAMINATION: ICD-10-CM

## 2018-08-30 DIAGNOSIS — B00.1 RECURRENT COLD SORES: ICD-10-CM

## 2018-08-30 DIAGNOSIS — N76.1 SUBACUTE VAGINITIS: ICD-10-CM

## 2018-08-30 PROCEDURE — 99395 PREV VISIT EST AGE 18-39: CPT | Performed by: PHYSICIAN ASSISTANT

## 2018-08-30 RX ORDER — FLUCONAZOLE 150 MG/1
150 TABLET ORAL DAILY
Qty: 3 TAB | Refills: 3 | Status: SHIPPED | OUTPATIENT
Start: 2018-08-30 | End: 2018-09-02

## 2018-08-30 RX ORDER — ACYCLOVIR 800 MG/1
800 TABLET ORAL 2 TIMES DAILY
Qty: 10 TAB | Refills: 6 | Status: SHIPPED | OUTPATIENT
Start: 2018-08-30 | End: 2018-09-04

## 2018-08-30 NOTE — PROGRESS NOTES
Chief Complaint   Patient presents with   • Establish Care   • Vaginitis     since     • Cold Sores     since childhood has taken acyclovir before        HISTORY OF THE PRESENT ILLNESS: This is a 28 y.o. female new patient to our practice. This pleasant patient is here today for general wellness exam. Generally very healthy. 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 Single. Non-smoker. 2 concerns to discuss. Up to date pap/pelvic. Normal healthy tracks labs - glucose, lipids.    Subacute vaginitis  Since November and waxing/waning discomfort - mostly pressure/tingling/irritation in external labia. Has had a few swabs. Positive for BV. No improvement with metronidazole or clindamycin. Has not had any odor, discharge or pain. Not sexually active. No urinary symptoms. Has had yeast infection before but that was thick white discharge, classic yeast infection symptoms. May be related to stress? Still getting normal regular periods. Using organic fragrance free tampons. Has used different creams, herbal treatments without benefit. Say gyn who rx cream for possible yeast, no improvement.    Recurrent cold sores  Since childhood. Not that frequent. Worse with sun exposure and stress. Would like to have an rx of acyclovir just in case of another outbreak.      History reviewed. No pertinent past medical history.no heart or lung disease. No diabetes or immune disorder    No past surgical history on file.    Family Status   Relation Status   • Mo Alive   • Fa Alive   • Sis Alive   • Bro Alive   • MGMo    • MGFa    • PGMo    • PGFa      Family History   Problem Relation Age of Onset   • Hypertension Mother         heart murmer   • No Known Problems Father    • No Known Problems Sister    • No Known Problems Brother    • Stroke Maternal Grandfather    • Cancer Paternal Grandmother        Social History   Substance Use Topics   • Smoking status: Never Smoker   • Smokeless tobacco: Never Used   •  "Alcohol use 1.8 oz/week     1 Glasses of wine, 1 Cans of beer, 1 Shots of liquor per week      Comment: social        Allergies: Patient has no known allergies.    Current Outpatient Prescriptions Ordered in [facility]   Medication Sig Dispense Refill   • fluconazole (DIFLUCAN) 150 MG tablet Take 1 Tab by mouth every day for 3 days. 3 Tab 3   • acyclovir (ZOVIRAX) 800 MG Tab Take 1 Tab by mouth 2 times a day for 5 days. 10 Tab 6     No current Epic-ordered facility-administered medications on file.        Review of Systems   Constitutional: Negative for fever, chills, weight loss and malaise/fatigue.   HENT: Negative for ear pain, nosebleeds, congestion, sore throat and neck pain.    Eyes: Negative for blurred vision.   Respiratory: Negative for cough, sputum production, shortness of breath and wheezing.    Cardiovascular: Negative for chest pain, palpitations, orthopnea and leg swelling.   Gastrointestinal: Negative for heartburn, nausea, vomiting and abdominal pain.   Genitourinary: Negative for dysuria, urgency and frequency.   Musculoskeletal: Negative for myalgias, back pain and joint pain.   Skin: Negative for rash and itching.   Neurological: Negative for dizziness, tingling, tremors, sensory change, focal weakness and headaches.   Endo/Heme/Allergies: Does not bruise/bleed easily.   Psychiatric/Behavioral: Negative for depression, anxiety, or memory loss.     All other systems reviewed and are negative except as in HPI.    Exam: Blood pressure 122/88, pulse 74, temperature 37.2 °C (99 °F), height 1.702 m (5' 7.01\"), weight 60.8 kg (134 lb 0.6 oz), SpO2 100 %.  General: Normal appearing. No distress.  Neck: Supple without JVD or bruit. Thyroid is not enlarged.  Pulmonary: Clear to ausculation.  Normal effort. No rales, ronchi, or wheezing.  Cardiovascular: Regular rate and rhythm without murmur. Carotid and radial pulses are intact and equal bilaterally.  Neurologic: Grossly nonfocal  Lymph: No cervical, " supraclavicular lymph nodes are palpable  Skin: Warm and dry.  No obvious lesions.  Musculoskeletal: Normal gait. No extremity cyanosis, clubbing, or edema.  Psych: Normal mood and affect. Alert and oriented x3. Judgment and insight is normal.  Assessment/Plan  1. Wellness examination     2. Subacute vaginitis  fluconazole (DIFLUCAN) 150 MG tablet   3. Recurrent cold sores  acyclovir (ZOVIRAX) 800 MG Tab   discussed possible role of stress/anxiety/psychologic causes for her symptoms. Will consider CBT. Will f/u with gyn.     F/u yearly and as needed

## 2018-08-30 NOTE — ASSESSMENT & PLAN NOTE
Since childhood. Not that frequent. Worse with sun exposure and stress. Would like to have an rx of acyclovir just in case of another outbreak.

## 2018-08-30 NOTE — ASSESSMENT & PLAN NOTE
Since November and waxing/waning discomfort - mostly pressure/tingling/irritation in external labia. Has had a few swabs. Positive for BV. No improvement with metronidazole or clindamycin. Has not had any odor, discharge or pain. Not sexually active. No urinary symptoms. Has had yeast infection before but that was thick white discharge, classic yeast infection symptoms. May be related to stress? Still getting normal regular periods. Using organic fragrance free tampons. Has used different creams, herbal treatments without benefit. Say gyn who rx cream for possible yeast, no improvement.

## 2018-08-30 NOTE — LETTER
[facility]  Toshia Granados P.A.-C.  [address]  [place] [address]  Fax: [phone]   Authorization for Release/Disclosure of   Protected Health Information   Name: DE RAO : 1990 SSN: [id number]   Address: [address] [place] [address]  [place] [address] Phone:    [phone] (home)    I authorize the entity listed below to release/disclose the PHI below to:   [facility]/Toshia Granados P.A.-C. and Toshia Granados P.A.-C.   Provider or Entity Name:     Address   City, State, Zip   Phone:    Fax:   Reason for request: continuity of care   Information to be released:    [  ] LAST COLONOSCOPY,  including any PATH REPORT and follow-up  [  ] LAST FIT/COLOGUARD RESULT [  ] LAST DEXA  [  ] LAST MAMMOGRAM  [  ] LAST PAP  [  ] LAST LABS [  ] RETINA EXAM REPORT  [  ] IMMUNIZATION RECORDS  [  ] Release all info      [  ] Check here and initial the line next to each item to release ALL health information INCLUDING  _____ Care and treatment for drug and / or alcohol abuse  _____ HIV testing, infection status, or AIDS  _____ Genetic Testing    DATES OF SERVICE OR TIME PERIOD TO BE DISCLOSED: _____________  I understand and acknowledge that:  * This Authorization may be revoked at any time by you in writing, except if your health information has already been used or disclosed.  * Your health information that will be used or disclosed as a result of you signing this authorization could be re-disclosed by the recipient. If this occurs, your re-disclosed health information may no longer be protected by State or Federal laws.  * You may refuse to sign this Authorization. Your refusal will not affect your ability to obtain treatment.  * This Authorization becomes effective upon signing and will  on (date) __________.      If no date is indicated, this Authorization will  one (1) year from the signature date.    Name: De Rao    Signature:   Date:     2018       PLEASE FAX REQUESTED RECORDS BACK TO: [phone]

## 2018-09-03 ENCOUNTER — PATIENT MESSAGE (OUTPATIENT)
Dept: MEDICAL GROUP | Facility: MEDICAL CENTER | Age: 28
End: 2018-09-03

## 2018-09-03 DIAGNOSIS — Z77.21 EXPOSURE TO BLOOD OR BODY FLUID: ICD-10-CM

## 2018-09-06 ENCOUNTER — HOSPITAL ENCOUNTER (OUTPATIENT)
Dept: LAB | Facility: MEDICAL CENTER | Age: 28
End: 2018-09-06
Attending: PHYSICIAN ASSISTANT
Payer: COMMERCIAL

## 2018-09-06 DIAGNOSIS — Z77.21 EXPOSURE TO BLOOD OR BODY FLUID: ICD-10-CM

## 2018-09-06 LAB
HBV SURFACE AB SERPL IA-ACNC: 324.41 MIU/ML (ref 0–10)
HCV AB SER QL: NEGATIVE
HIV 1+2 AB+HIV1 P24 AG SERPL QL IA: NON REACTIVE

## 2018-09-06 PROCEDURE — 86803 HEPATITIS C AB TEST: CPT

## 2018-09-06 PROCEDURE — 36415 COLL VENOUS BLD VENIPUNCTURE: CPT

## 2018-09-06 PROCEDURE — 87389 HIV-1 AG W/HIV-1&-2 AB AG IA: CPT

## 2018-09-06 PROCEDURE — 86706 HEP B SURFACE ANTIBODY: CPT

## 2018-10-04 ENCOUNTER — IMMUNIZATION (OUTPATIENT)
Dept: OCCUPATIONAL MEDICINE | Facility: CLINIC | Age: 28
End: 2018-10-04

## 2018-10-04 DIAGNOSIS — Z23 NEED FOR VACCINATION: ICD-10-CM

## 2018-10-09 PROCEDURE — 90686 IIV4 VACC NO PRSV 0.5 ML IM: CPT | Performed by: PREVENTIVE MEDICINE

## 2018-10-23 ENCOUNTER — EH NON-PROVIDER (OUTPATIENT)
Dept: OCCUPATIONAL MEDICINE | Facility: CLINIC | Age: 28
End: 2018-10-23

## 2018-10-23 PROCEDURE — 94375 RESPIRATORY FLOW VOLUME LOOP: CPT | Performed by: PREVENTIVE MEDICINE

## 2018-10-24 NOTE — ADDENDUM NOTE
Addended by: ABELINO DICKERSON on: 10/23/2018 05:25 PM     Modules accepted: Level of Service, SmartSet

## 2018-12-02 ENCOUNTER — PATIENT MESSAGE (OUTPATIENT)
Dept: MEDICAL GROUP | Facility: MEDICAL CENTER | Age: 28
End: 2018-12-02

## 2018-12-02 DIAGNOSIS — N76.1 CHRONIC VAGINITIS: ICD-10-CM

## 2019-03-24 ENCOUNTER — PATIENT MESSAGE (OUTPATIENT)
Dept: MEDICAL GROUP | Facility: MEDICAL CENTER | Age: 29
End: 2019-03-24

## 2019-03-24 DIAGNOSIS — B00.1 RECURRENT COLD SORES: ICD-10-CM

## 2019-03-26 RX ORDER — ACYCLOVIR 400 MG/1
400 TABLET ORAL 3 TIMES DAILY
Qty: 15 TAB | Refills: 6 | Status: SHIPPED | OUTPATIENT
Start: 2019-03-26 | End: 2019-09-27 | Stop reason: SDUPTHER

## 2019-05-22 ENCOUNTER — PATIENT MESSAGE (OUTPATIENT)
Dept: MEDICAL GROUP | Facility: MEDICAL CENTER | Age: 29
End: 2019-05-22

## 2019-05-22 DIAGNOSIS — B07.0 PLANTAR WART: ICD-10-CM

## 2019-05-22 RX ORDER — IMIQUIMOD 12.5 MG/.25G
CREAM TOPICAL
Qty: 30 EACH | Refills: 2 | Status: SHIPPED
Start: 2019-05-22 | End: 2019-12-29

## 2019-06-18 ENCOUNTER — HOSPITAL ENCOUNTER (OUTPATIENT)
Dept: LAB | Facility: MEDICAL CENTER | Age: 29
End: 2019-06-18
Payer: COMMERCIAL

## 2019-06-18 LAB
BDY FAT % MEASURED: 21.1 %
BP DIAS: 81 MMHG
BP SYS: 115 MMHG
CHOLEST SERPL-MCNC: 159 MG/DL (ref 100–199)
DIABETES HTDIA: NO
EVENT NAME HTEVT: NORMAL
FASTING HTFAS: YES
FASTING STATUS PATIENT QL REPORTED: NORMAL
GLUCOSE SERPL-MCNC: 81 MG/DL (ref 65–99)
HDLC SERPL-MCNC: 67 MG/DL
HYPERTENSION HTHYP: NO
LDLC SERPL CALC-MCNC: 80 MG/DL
SCREENING LOC CITY HTCIT: NORMAL
SCREENING LOC STATE HTSTA: NORMAL
SCREENING LOCATION HTLOC: NORMAL
SMOKING HTSMO: NO
SUBSCRIBER ID HTSID: NORMAL
TRIGL SERPL-MCNC: 58 MG/DL (ref 0–149)

## 2019-06-18 PROCEDURE — 36415 COLL VENOUS BLD VENIPUNCTURE: CPT

## 2019-06-18 PROCEDURE — S5190 WELLNESS ASSESSMENT BY NONPH: HCPCS

## 2019-06-18 PROCEDURE — 82947 ASSAY GLUCOSE BLOOD QUANT: CPT

## 2019-06-18 PROCEDURE — 80061 LIPID PANEL: CPT

## 2019-09-27 ENCOUNTER — PATIENT MESSAGE (OUTPATIENT)
Dept: MEDICAL GROUP | Facility: MEDICAL CENTER | Age: 29
End: 2019-09-27

## 2019-09-27 DIAGNOSIS — B00.1 RECURRENT COLD SORES: ICD-10-CM

## 2019-09-27 NOTE — TELEPHONE ENCOUNTER
From: Elzbieta Rao  To: Toshia Granados P.A.-C.  Sent: 9/27/2019 3:57 PM PDT  Subject: Prescription Question    Josef Pope   I’m wondering if you could send over more acyclovir to [facility] because my refills are almost out. Any way I could get more prescribed at one time so I don’t have to back as often to get them refilled and can just have them on hand? [place] and want to have them with my for possible cold Mauro outbreaks. thanks!!  Elzbieta

## 2019-09-30 RX ORDER — ACYCLOVIR 400 MG/1
400 TABLET ORAL 3 TIMES DAILY
Qty: 45 TAB | Refills: 0 | Status: SHIPPED | OUTPATIENT
Start: 2019-09-30 | End: 2019-10-08 | Stop reason: SDUPTHER

## 2019-10-08 DIAGNOSIS — B00.1 RECURRENT COLD SORES: ICD-10-CM

## 2019-10-09 RX ORDER — ACYCLOVIR 400 MG/1
400 TABLET ORAL 3 TIMES DAILY
Qty: 90 TAB | Refills: 0 | Status: SHIPPED | OUTPATIENT
Start: 2019-10-09 | End: 2019-10-24

## 2019-10-09 NOTE — TELEPHONE ENCOUNTER
PATIENT WANTS A BIGGER QUANTITY. SHE IS GOING OVER SEAS. LAST VISIT 8/2018    Was the patient seen in the last year in this department? Yes    Does patient have an active prescription for medications requested? No     Received Request Via: Pharmacy

## 2019-12-12 ENCOUNTER — NON-PROVIDER VISIT (OUTPATIENT)
Dept: OCCUPATIONAL MEDICINE | Facility: CLINIC | Age: 29
End: 2019-12-12

## 2019-12-12 DIAGNOSIS — Z23 NEED FOR IMMUNIZATION AGAINST INFLUENZA: Primary | ICD-10-CM

## 2019-12-12 PROCEDURE — 90686 IIV4 VACC NO PRSV 0.5 ML IM: CPT | Performed by: NURSE PRACTITIONER

## 2019-12-29 ENCOUNTER — OFFICE VISIT (OUTPATIENT)
Dept: URGENT CARE | Facility: CLINIC | Age: 29
End: 2019-12-29
Payer: COMMERCIAL

## 2019-12-29 VITALS
HEART RATE: 80 BPM | WEIGHT: 145 LBS | BODY MASS INDEX: 22.76 KG/M2 | OXYGEN SATURATION: 99 % | DIASTOLIC BLOOD PRESSURE: 74 MMHG | SYSTOLIC BLOOD PRESSURE: 108 MMHG | RESPIRATION RATE: 20 BRPM | TEMPERATURE: 98.6 F | HEIGHT: 67 IN

## 2019-12-29 DIAGNOSIS — L72.3 SEBACEOUS CYST OF LEFT AXILLA: ICD-10-CM

## 2019-12-29 DIAGNOSIS — L02.419 ABSCESS OF AXILLA: Primary | ICD-10-CM

## 2019-12-29 PROCEDURE — 10061 I&D ABSCESS COMP/MULTIPLE: CPT | Performed by: PHYSICIAN ASSISTANT

## 2019-12-29 RX ORDER — IBUPROFEN 200 MG
200-400 TABLET ORAL 2 TIMES DAILY PRN
Status: ON HOLD | COMMUNITY
End: 2023-12-17

## 2019-12-29 RX ORDER — SULFAMETHOXAZOLE AND TRIMETHOPRIM 800; 160 MG/1; MG/1
1 TABLET ORAL 2 TIMES DAILY
Qty: 10 TAB | Refills: 0 | Status: SHIPPED | OUTPATIENT
Start: 2019-12-29 | End: 2020-01-03

## 2019-12-29 ASSESSMENT — ENCOUNTER SYMPTOMS
SHORTNESS OF BREATH: 0
FEVER: 0
NAUSEA: 0
COUGH: 0
NUMBNESS: 0
ABDOMINAL PAIN: 0
WEAKNESS: 0
CONSTIPATION: 0
VOMITING: 0
DIARRHEA: 0
CHILLS: 0

## 2019-12-29 ASSESSMENT — PAIN SCALES - GENERAL: PAINLEVEL: 3=SLIGHT PAIN

## 2019-12-29 NOTE — PROGRESS NOTES
Subjective:   Elzbieta Rao is a 29 y.o. female who presents for Rash (right armpit, painful, swelling, pus x 1 week )        Cyst   This is a new problem. Episode onset: 1 week  The problem occurs constantly. The problem has been unchanged. Pertinent negatives include no abdominal pain, chills, congestion, coughing, fever, nausea, numbness, rash, vomiting or weakness. Treatments tried: heat compress      Review of Systems   Constitutional: Negative for chills, fever and malaise/fatigue.   HENT: Negative for congestion.    Respiratory: Negative for cough and shortness of breath.    Gastrointestinal: Negative for abdominal pain, constipation, diarrhea, nausea and vomiting.   Skin: Negative for rash.   Neurological: Negative for weakness and numbness.   All other systems reviewed and are negative.      PMH:  has no past medical history on file.  MEDS:   Current Outpatient Medications:   •  ibuprofen (MOTRIN) 200 MG Tab, Take 400 mg by mouth every 6 hours as needed., Disp: , Rfl:   •  mupirocin (BACTROBAN) 2 % Ointment, Apply 1 Application to affected area(s) 2 times a day for 10 days., Disp: 1 Tube, Rfl: 0  •  sulfamethoxazole-trimethoprim (BACTRIM DS) 800-160 MG tablet, Take 1 Tab by mouth 2 times a day for 5 days., Disp: 10 Tab, Rfl: 0  ALLERGIES: No Known Allergies  SURGHX: History reviewed. No pertinent surgical history.  SOCHX:  reports that she has never smoked. She has never used smokeless tobacco. She reports current alcohol use of about 1.8 oz of alcohol per week. She reports that she does not use drugs.  Family History   Problem Relation Age of Onset   • Hypertension Mother         heart murmer   • No Known Problems Father    • No Known Problems Sister    • No Known Problems Brother    • Stroke Maternal Grandfather    • Cancer Paternal Grandmother         Objective:   /74 (BP Location: Left arm, Patient Position: Sitting, BP Cuff Size: Adult)   Pulse 80   Temp 37 °C (98.6 °F) (Temporal)    "Resp 20   Ht 1.702 m (5' 7\")   Wt 65.8 kg (145 lb)   SpO2 99%   BMI 22.71 kg/m²     Physical Exam  Vitals signs reviewed.   Constitutional:       General: She is not in acute distress.     Appearance: She is well-developed.   HENT:      Head: Normocephalic and atraumatic.      Right Ear: External ear normal.      Left Ear: External ear normal.      Nose: Nose normal.      Mouth/Throat:      Mouth: Mucous membranes are moist.   Eyes:      Conjunctiva/sclera: Conjunctivae normal.      Pupils: Pupils are equal, round, and reactive to light.   Neck:      Musculoskeletal: Normal range of motion and neck supple.      Trachea: No tracheal deviation.   Cardiovascular:      Rate and Rhythm: Normal rate and regular rhythm.   Pulmonary:      Effort: Pulmonary effort is normal. No respiratory distress.      Breath sounds: Normal breath sounds. No wheezing or rales.   Skin:     General: Skin is warm and dry.      Capillary Refill: Capillary refill takes less than 2 seconds.          Neurological:      General: No focal deficit present.      Mental Status: She is alert and oriented to person, place, and time.   Psychiatric:         Mood and Affect: Mood normal.         Behavior: Behavior normal.           Assessment/Plan:     1. Abscess of axilla     2. Sebaceous cyst of left axilla  mupirocin (BACTROBAN) 2 % Ointment    sulfamethoxazole-trimethoprim (BACTRIM DS) 800-160 MG tablet     PROCEDURE NOTE: INCISION AND DRAINAGE OF ABSCESS  Indications, risks, and benefits explained to patient and verbal informed consent obtained. Fluctuant area measuring 3.0 cm on the patients R axilla. Local anesthesia achieved with approx. 1.5  cc of 1% lidocaine with epinephrine. Area cleaned with 10% betadine solution. 3 mm incision vertical made with sterile #11 blade scalpel, loculations were probed. Copious amounts of purulent discharge expressed. The patient tolerated well. Wound care reviewed. Abscess after care hand out provided. The pt " will cleanse area with hibclens.     Follow-up with primary care provider within 7-10 days.  If symptoms worsen or persist patient can return to clinic for reevaluation. All side effects of medication discussed including allergic response, GI upset, tendon injury, etc. Patient confirmed understanding of information.    Please note that this dictation was created using voice recognition software. I have made every reasonable attempt to correct obvious errors, but I expect that there are errors of grammar and possibly content that I did not discover before finalizing the note.

## 2020-01-02 ENCOUNTER — OFFICE VISIT (OUTPATIENT)
Dept: URGENT CARE | Facility: CLINIC | Age: 30
End: 2020-01-02
Payer: COMMERCIAL

## 2020-01-02 VITALS
HEART RATE: 78 BPM | BODY MASS INDEX: 22.76 KG/M2 | HEIGHT: 67 IN | WEIGHT: 145 LBS | SYSTOLIC BLOOD PRESSURE: 108 MMHG | RESPIRATION RATE: 12 BRPM | TEMPERATURE: 98.7 F | OXYGEN SATURATION: 98 % | DIASTOLIC BLOOD PRESSURE: 70 MMHG

## 2020-01-02 DIAGNOSIS — L02.419 ABSCESS, AXILLA: ICD-10-CM

## 2020-01-02 PROCEDURE — 10061 I&D ABSCESS COMP/MULTIPLE: CPT | Performed by: PHYSICIAN ASSISTANT

## 2020-01-02 RX ORDER — SULFAMETHOXAZOLE AND TRIMETHOPRIM 800; 160 MG/1; MG/1
1 TABLET ORAL 2 TIMES DAILY
Qty: 6 TAB | Refills: 0 | Status: SHIPPED | OUTPATIENT
Start: 2020-01-02 | End: 2020-01-05

## 2020-01-03 ASSESSMENT — ENCOUNTER SYMPTOMS
DIARRHEA: 0
CHILLS: 0
FEVER: 0
NAUSEA: 0
ABDOMINAL PAIN: 0
COUGH: 0
SHORTNESS OF BREATH: 0
VOMITING: 0
CONSTIPATION: 0

## 2020-01-03 NOTE — PROGRESS NOTES
Subjective:   Elzbieta Rao is a 29 y.o. female who presents for Abscess (RIGHT axilla)        HPI   The pt was seen on 12/29/19 tx for axilla abscess with I&D. There were 3 smaller papules present at the time of visit that were not drained. Two of the smaller abscess have worsened. The original larger abscess has improved and almost resolved now. She has been taking Bactirm DS BID with improvement. She has been applying warm compresses without improvement. No other aggravating or alleviating factors. No fever or chills. No nausea or vomiting. No difficulty moving extremity.     Review of Systems   Constitutional: Negative for chills, fever and malaise/fatigue.   Respiratory: Negative for cough and shortness of breath.    Gastrointestinal: Negative for abdominal pain, constipation, diarrhea, nausea and vomiting.   Skin:        Pos abscess axilla R x 2    All other systems reviewed and are negative.      PMH:  has no past medical history on file.  MEDS:   Current Outpatient Medications:   •  sulfamethoxazole-trimethoprim (BACTRIM DS) 800-160 MG tablet, Take 1 Tab by mouth 2 times a day for 3 days., Disp: 6 Tab, Rfl: 0  •  ibuprofen (MOTRIN) 200 MG Tab, Take 400 mg by mouth every 6 hours as needed., Disp: , Rfl:   •  mupirocin (BACTROBAN) 2 % Ointment, Apply 1 Application to affected area(s) 2 times a day for 10 days., Disp: 1 Tube, Rfl: 0  •  sulfamethoxazole-trimethoprim (BACTRIM DS) 800-160 MG tablet, Take 1 Tab by mouth 2 times a day for 5 days., Disp: 10 Tab, Rfl: 0  ALLERGIES: No Known Allergies  SURGHX: History reviewed. No pertinent surgical history.  SOCHX:  reports that she has never smoked. She has never used smokeless tobacco. She reports current alcohol use of about 1.8 oz of alcohol per week. She reports that she does not use drugs.  Family History   Problem Relation Age of Onset   • Hypertension Mother         heart murmer   • No Known Problems Father    • No Known Problems Sister    • No Known  "Problems Brother    • Stroke Maternal Grandfather    • Cancer Paternal Grandmother         Objective:   /70 (BP Location: Right arm, Patient Position: Sitting, BP Cuff Size: Adult)   Pulse 78   Temp 37.1 °C (98.7 °F) (Temporal)   Resp 12   Ht 1.702 m (5' 7\")   Wt 65.8 kg (145 lb)   LMP 12/24/2019 (Exact Date)   SpO2 98%   Breastfeeding? No   BMI 22.71 kg/m²     Physical Exam  Vitals signs reviewed.   Constitutional:       General: She is not in acute distress.     Appearance: She is well-developed.   HENT:      Head: Normocephalic and atraumatic.      Right Ear: External ear normal.      Left Ear: External ear normal.      Nose: Nose normal.      Mouth/Throat:      Mouth: Mucous membranes are moist.   Eyes:      Conjunctiva/sclera: Conjunctivae normal.      Pupils: Pupils are equal, round, and reactive to light.   Neck:      Musculoskeletal: Normal range of motion and neck supple.      Trachea: No tracheal deviation.   Cardiovascular:      Rate and Rhythm: Normal rate and regular rhythm.   Pulmonary:      Effort: Pulmonary effort is normal.      Breath sounds: Normal breath sounds.   Skin:     General: Skin is warm and dry.      Capillary Refill: Capillary refill takes less than 2 seconds.          Neurological:      General: No focal deficit present.      Mental Status: She is alert and oriented to person, place, and time.   Psychiatric:         Mood and Affect: Mood normal.         Behavior: Behavior normal.           Assessment/Plan:     1. Abscess, axilla  sulfamethoxazole-trimethoprim (BACTRIM DS) 800-160 MG tablet     PROCEDURE NOTE: INCISION AND DRAINAGE OF ABSCESS  Indications, risks, and benefits explained to patient and verbal informed consent obtained. Fluctuant area measuring 3.0, 3.0 cm on the patients R axilla x 2. Local anesthesia achieved with approx. 2.0 cc of 1% lidocaine with epinephrine. Area cleaned with 10% betadine solution. 3 mm incision vertical made with sterile #11 blade " scalpel, loculations were probed. Copious amounts of purulent discharge expressed. Wounds dressed with dry, sterile dressing. The patient tolerated well.     Continue wound care. Pt given 3 additional days of bactrim. Monitor sx closely.     Follow-up with primary care provider within 7-10 days.  If symptoms worsen or persist patient can return to clinic for reevaluation. All side effects of medication discussed including allergic response, GI upset, tendon injury, etc. Patient confirmed understanding of information.    Please note that this dictation was created using voice recognition software. I have made every reasonable attempt to correct obvious errors, but I expect that there are errors of grammar and possibly content that I did not discover before finalizing the note.

## 2020-01-08 ENCOUNTER — APPOINTMENT (RX ONLY)
Dept: URBAN - METROPOLITAN AREA CLINIC 20 | Facility: CLINIC | Age: 30
Setting detail: DERMATOLOGY
End: 2020-01-08

## 2020-01-08 DIAGNOSIS — D18.0 HEMANGIOMA: ICD-10-CM

## 2020-01-08 DIAGNOSIS — L81.4 OTHER MELANIN HYPERPIGMENTATION: ICD-10-CM

## 2020-01-08 DIAGNOSIS — L72.8 OTHER FOLLICULAR CYSTS OF THE SKIN AND SUBCUTANEOUS TISSUE: ICD-10-CM | Status: STABLE

## 2020-01-08 DIAGNOSIS — D22 MELANOCYTIC NEVI: ICD-10-CM

## 2020-01-08 PROBLEM — D23.39 OTHER BENIGN NEOPLASM OF SKIN OF OTHER PARTS OF FACE: Status: ACTIVE | Noted: 2020-01-08

## 2020-01-08 PROBLEM — D48.5 NEOPLASM OF UNCERTAIN BEHAVIOR OF SKIN: Status: ACTIVE | Noted: 2020-01-08

## 2020-01-08 PROBLEM — D22.39 MELANOCYTIC NEVI OF OTHER PARTS OF FACE: Status: ACTIVE | Noted: 2020-01-08

## 2020-01-08 PROBLEM — D22.71 MELANOCYTIC NEVI OF RIGHT LOWER LIMB, INCLUDING HIP: Status: ACTIVE | Noted: 2020-01-08

## 2020-01-08 PROBLEM — D22.5 MELANOCYTIC NEVI OF TRUNK: Status: ACTIVE | Noted: 2020-01-08

## 2020-01-08 PROBLEM — D18.01 HEMANGIOMA OF SKIN AND SUBCUTANEOUS TISSUE: Status: ACTIVE | Noted: 2020-01-08

## 2020-01-08 PROCEDURE — ? OBSERVATION AND MEASURE

## 2020-01-08 PROCEDURE — ? PRESCRIPTION

## 2020-01-08 PROCEDURE — ? ADDITIONAL NOTES

## 2020-01-08 PROCEDURE — ? COUNSELING

## 2020-01-08 PROCEDURE — 99203 OFFICE O/P NEW LOW 30 MIN: CPT

## 2020-01-08 RX ORDER — CLINDAMYCIN PHOSPHATE 10 MG/ML
SOLUTION TOPICAL
Qty: 1 | Refills: 2 | Status: ERX | COMMUNITY
Start: 2020-01-08

## 2020-01-08 RX ADMIN — CLINDAMYCIN PHOSPHATE THIN LAYER: 10 SOLUTION TOPICAL at 00:00

## 2020-01-08 ASSESSMENT — LOCATION SIMPLE DESCRIPTION DERM
LOCATION SIMPLE: ABDOMEN
LOCATION SIMPLE: RIGHT PRETIBIAL REGION
LOCATION SIMPLE: LEFT CHEEK
LOCATION SIMPLE: RIGHT UPPER BACK
LOCATION SIMPLE: LEFT FOREARM
LOCATION SIMPLE: CHEST
LOCATION SIMPLE: RIGHT FOREARM

## 2020-01-08 ASSESSMENT — LOCATION DETAILED DESCRIPTION DERM
LOCATION DETAILED: RIGHT DISTAL DORSAL FOREARM
LOCATION DETAILED: LEFT SUPERIOR CENTRAL BUCCAL CHEEK
LOCATION DETAILED: RIGHT MID-UPPER BACK
LOCATION DETAILED: RIGHT DISTAL PRETIBIAL REGION
LOCATION DETAILED: RIGHT SUPERIOR MEDIAL UPPER BACK
LOCATION DETAILED: LEFT LATERAL ABDOMEN
LOCATION DETAILED: LEFT INFERIOR CENTRAL MALAR CHEEK
LOCATION DETAILED: LEFT DISTAL DORSAL FOREARM
LOCATION DETAILED: LEFT MEDIAL SUPERIOR CHEST

## 2020-01-08 ASSESSMENT — LOCATION ZONE DERM
LOCATION ZONE: ARM
LOCATION ZONE: FACE
LOCATION ZONE: TRUNK
LOCATION ZONE: LEG

## 2020-01-08 NOTE — PROCEDURE: ADDITIONAL NOTES
Detail Level: Simple
Additional Notes: Discussed using hibacleans. If reoccurring RTC for further discussion for HS
Additional Notes: Refer to cosmetics for treatment

## 2020-04-10 ENCOUNTER — EH NON-PROVIDER (OUTPATIENT)
Dept: OCCUPATIONAL MEDICINE | Facility: CLINIC | Age: 30
End: 2020-04-10

## 2020-04-10 DIAGNOSIS — Z01.89 RESPIRATORY CLEARANCE EXAMINATION, ENCOUNTER FOR: ICD-10-CM

## 2020-04-10 PROCEDURE — 94375 RESPIRATORY FLOW VOLUME LOOP: CPT | Performed by: NURSE PRACTITIONER

## 2020-10-06 RX ORDER — ACYCLOVIR 400 MG/1
400 TABLET ORAL 3 TIMES DAILY
Qty: 90 TAB | Refills: 2 | Status: SHIPPED | OUTPATIENT
Start: 2020-10-06 | End: 2022-09-21 | Stop reason: SDUPTHER

## 2020-10-07 NOTE — PROGRESS NOTES
S: Pt called today and c/o feeling like may have oral cold sore coming on.  Pt states has had in the past and usually treats with acyclovir with good relief.  This is a recurrent problem for her.    A/P: Will prescribe acyclovir 400mg TID x 15 days.    Aware to call back if not effective.

## 2020-10-08 ENCOUNTER — IMMUNIZATION (OUTPATIENT)
Dept: OCCUPATIONAL MEDICINE | Facility: CLINIC | Age: 30
End: 2020-10-08

## 2020-10-08 DIAGNOSIS — Z23 NEED FOR VACCINATION: ICD-10-CM

## 2020-10-08 PROCEDURE — 90686 IIV4 VACC NO PRSV 0.5 ML IM: CPT | Performed by: NURSE PRACTITIONER

## 2020-12-17 DIAGNOSIS — Z23 NEED FOR VACCINATION: ICD-10-CM

## 2020-12-19 ENCOUNTER — HOSPITAL ENCOUNTER (OUTPATIENT)
Dept: LAB | Facility: MEDICAL CENTER | Age: 30
End: 2020-12-19
Attending: NURSE PRACTITIONER
Payer: COMMERCIAL

## 2020-12-19 DIAGNOSIS — Z20.822 EXPOSURE TO COVID-19 VIRUS: ICD-10-CM

## 2020-12-19 PROCEDURE — C9803 HOPD COVID-19 SPEC COLLECT: HCPCS

## 2020-12-19 PROCEDURE — U0003 INFECTIOUS AGENT DETECTION BY NUCLEIC ACID (DNA OR RNA); SEVERE ACUTE RESPIRATORY SYNDROME CORONAVIRUS 2 (SARS-COV-2) (CORONAVIRUS DISEASE [COVID-19]), AMPLIFIED PROBE TECHNIQUE, MAKING USE OF HIGH THROUGHPUT TECHNOLOGIES AS DESCRIBED BY CMS-2020-01-R: HCPCS

## 2020-12-20 LAB
COVID ORDER STATUS COVID19: NORMAL
SARS-COV-2 RNA RESP QL NAA+PROBE: NOTDETECTED
SPECIMEN SOURCE: NORMAL

## 2020-12-21 DIAGNOSIS — Z20.822 EXPOSURE TO COVID-19 VIRUS: ICD-10-CM

## 2020-12-22 ENCOUNTER — HOSPITAL ENCOUNTER (OUTPATIENT)
Dept: LAB | Facility: MEDICAL CENTER | Age: 30
End: 2020-12-22
Attending: NURSE PRACTITIONER
Payer: COMMERCIAL

## 2020-12-22 DIAGNOSIS — Z20.822 EXPOSURE TO COVID-19 VIRUS: ICD-10-CM

## 2020-12-22 LAB
COVID ORDER STATUS COVID19: NORMAL
SARS-COV-2 RNA RESP QL NAA+PROBE: DETECTED
SPECIMEN SOURCE: ABNORMAL

## 2020-12-22 PROCEDURE — U0003 INFECTIOUS AGENT DETECTION BY NUCLEIC ACID (DNA OR RNA); SEVERE ACUTE RESPIRATORY SYNDROME CORONAVIRUS 2 (SARS-COV-2) (CORONAVIRUS DISEASE [COVID-19]), AMPLIFIED PROBE TECHNIQUE, MAKING USE OF HIGH THROUGHPUT TECHNOLOGIES AS DESCRIBED BY CMS-2020-01-R: HCPCS

## 2020-12-22 NOTE — PROGRESS NOTES
Order placed for second covid test 7 days from direct contact with symptomatic covid positive person.  Pt remains asymptomatic

## 2021-01-23 ENCOUNTER — IMMUNIZATION (OUTPATIENT)
Dept: FAMILY PLANNING/WOMEN'S HEALTH CLINIC | Facility: IMMUNIZATION CENTER | Age: 31
End: 2021-01-23
Payer: COMMERCIAL

## 2021-01-23 DIAGNOSIS — Z23 ENCOUNTER FOR VACCINATION: Primary | ICD-10-CM

## 2021-01-23 PROCEDURE — 0001A PFIZER SARS-COV-2 VACCINE: CPT

## 2021-01-23 PROCEDURE — 91300 PFIZER SARS-COV-2 VACCINE: CPT

## 2021-02-01 ENCOUNTER — GYNECOLOGY VISIT (OUTPATIENT)
Dept: OBGYN | Facility: CLINIC | Age: 31
End: 2021-02-01
Payer: COMMERCIAL

## 2021-02-01 ENCOUNTER — HOSPITAL ENCOUNTER (OUTPATIENT)
Facility: MEDICAL CENTER | Age: 31
End: 2021-02-01
Attending: NURSE PRACTITIONER
Payer: COMMERCIAL

## 2021-02-01 VITALS
BODY MASS INDEX: 23.07 KG/M2 | HEIGHT: 67 IN | SYSTOLIC BLOOD PRESSURE: 104 MMHG | DIASTOLIC BLOOD PRESSURE: 86 MMHG | WEIGHT: 147 LBS

## 2021-02-01 DIAGNOSIS — L29.2 VULVAR ITCHING: ICD-10-CM

## 2021-02-01 DIAGNOSIS — N92.1 IRREGULAR INTERMENSTRUAL BLEEDING: ICD-10-CM

## 2021-02-01 DIAGNOSIS — Z01.419 WELL WOMAN EXAM WITH ROUTINE GYNECOLOGICAL EXAM: Primary | ICD-10-CM

## 2021-02-01 DIAGNOSIS — N84.0 ENDOMETRIAL POLYP: ICD-10-CM

## 2021-02-01 PROCEDURE — 88175 CYTOPATH C/V AUTO FLUID REDO: CPT

## 2021-02-01 PROCEDURE — 87591 N.GONORRHOEAE DNA AMP PROB: CPT

## 2021-02-01 PROCEDURE — 99385 PREV VISIT NEW AGE 18-39: CPT | Performed by: NURSE PRACTITIONER

## 2021-02-01 PROCEDURE — 87624 HPV HI-RISK TYP POOLED RSLT: CPT

## 2021-02-01 PROCEDURE — 87491 CHLMYD TRACH DNA AMP PROBE: CPT

## 2021-02-01 RX ORDER — GABAPENTIN 100 MG/1
100 CAPSULE ORAL 2 TIMES DAILY
Qty: 60 CAP | Refills: 1 | Status: SHIPPED | OUTPATIENT
Start: 2021-02-01 | End: 2021-11-12

## 2021-02-01 ASSESSMENT — ENCOUNTER SYMPTOMS
ABDOMINAL PAIN: 0
CARDIOVASCULAR NEGATIVE: 1
RESPIRATORY NEGATIVE: 1
MUSCULOSKELETAL NEGATIVE: 1
CONSTITUTIONAL NEGATIVE: 1
CONSTIPATION: 0
EYES NEGATIVE: 1
TINGLING: 1
DEPRESSION: 1
DIARRHEA: 0
GASTROINTESTINAL NEGATIVE: 1

## 2021-02-01 NOTE — PROGRESS NOTES
"Elzbieta Rao is a 30 y.o. y.o. female who presents for her Gynecologic Exam         HPI Comments: Pt presents for well woman exam. Pt has several complaints today.  She has been having intermenstrual bleeding starting 13 years ago. At that time she also had severe menstrual like cramping that would happen mostly during exercise. She had this investigated at the time and was found to have endometrial polyps which were removed laparoscopically and the the intermenstrual bleeding resolved after the polyps were removed 10 years ago (2010).     The intermenstrual bleeding restarted again this past year.   It has happened every 2-3 cycles where she has intermenstrual spotting but no cramping.  She describes the intermenstrual bleeding as dark red, only when she voids, then darker brown when wipes and it seldom is heavy enough to leak into her underwear. She has tracked it for the last several months and the spotting seems to be happening most heavily when she is ovulating,  then it slows down . She has noted the intermenstrual bleeding occuring in October, December and January, with January being the heaviest.     She also has complaints of vulvar itching and burning that started November 2017. She describes it as a \"Tingling achy twitchy feel  that I need to adjust my labia.  It feels engorged and swollen\".  Itchiness goes up to perineum and to her inner buttocks which get itchy and warm.  It feels like the twinge moves throughout the day. When the symptoms start it lasts for a full day, and happens 2-3 times per week.   The symptoms went away in the middle of last year for several months then came back in November 2020 and have been more consistent and regular since then.  She reports that intercourse seems to improve the symptoms, making them go away temporarily.  She did also try yeast infection treatments which did not improve the symptoms. She states when the symptoms first started she was feeling somewhat " despondent about them but has slowly gotten used to them but it is getting to the point where she wants to figure it out so the symptoms can be treated and hopefully go away.       LMP 1/9/2021.  First menses age 13, Cycles every 26 days, lasting 4 days, Bleeding is moderate.       She is sexually active with 1 male partner currently. She uses condoms for contraception.      Saw Dr Pena in December of 2020 as well as another MD in 2017.  Both ruled out yeast, BV, trich, GC/CT.  Her last pap was in 2017.       Review of Systems   Pertinent positives documented in HPI and all other systems reviewed & are negative  Review of Systems   Constitutional: Negative.    HENT: Negative.    Eyes: Negative.    Respiratory: Negative.    Cardiovascular: Negative.    Gastrointestinal: Negative.  Negative for abdominal pain, constipation and diarrhea.   Genitourinary: Negative.    Musculoskeletal: Negative.    Skin: Negative.    Neurological: Positive for tingling.   Endo/Heme/Allergies: Negative.    Psychiatric/Behavioral: Positive for depression.   All other systems reviewed and are negative.         All PMH, PSH, allergies, social history and FH reviewed and updated today:  Past Medical History   No past medical history on file.     Past Surgical History   laproscopic endometrial polyp removal 2010  ACL repair 2020     Patient has no known allergies.  Social History               Socioeconomic History   • Marital status: Single       Spouse name: Not on file   • Number of children: Not on file   • Years of education: Not on file   • Highest education level: Not on file   Occupational History   • Occupation: midwife   Social Needs   • Financial resource strain: Not on file   • Food insecurity       Worry: Not on file       Inability: Not on file   • Transportation needs       Medical: Not on file       Non-medical: Not on file   Tobacco Use   • Smoking status: Never Smoker   • Smokeless tobacco: Never Used   Substance and  Sexual Activity   • Alcohol use: Yes       Alcohol/week: 1.8 oz       Types: 1 Glasses of wine, 1 Cans of beer, 1 Shots of liquor per week       Comment: social    • Drug use: No   • Sexual activity: Never       Partners: Male, Female   Lifestyle   • Physical activity       Days per week: Not on file       Minutes per session: Not on file   • Stress: Not on file   Relationships   • Social connections       Talks on phone: Not on file       Gets together: Not on file       Attends [employer] service: Not on file       Active member of club or organization: Not on file       Attends meetings of clubs or organizations: Not on file       Relationship status: Not on file   • Intimate partner violence       Fear of current or ex partner: Not on file       Emotionally abused: Not on file       Physically abused: Not on file       Forced sexual activity: Not on file   Other Topics Concern   • Not on file   Social History Narrative   • Not on file         Family History         Family History   Problem Relation Age of Onset   • Hypertension Mother           heart murmer   • No Known Problems Father     • No Known Problems Sister     • No Known Problems Brother     • Stroke Maternal Grandfather     • Cancer Paternal Grandmother           Medications:   Current Medications and Prescriptions Ordered in [facility]          Current Outpatient Medications Ordered in Epic   Medication Sig Dispense Refill   • acyclovir (ZOVIRAX) 400 MG tablet Take 1 Tab by mouth 3 times a day. 90 Tab 2   • ibuprofen (MOTRIN) 200 MG Tab Take 400 mg by mouth every 6 hours as needed.          No current Epic-ordered facility-administered medications on file.                 Objective:   Vital measurements: 104/86.   Height 5'7   Weight 147lbs  BMI 23.02       Physical Exam   Nursing note and vitals reviewed.  Constitutional: She is oriented to person, place, and time. She appears well-developed and well-nourished. No distress.      HEENT:   Head:  Normocephalic and atraumatic.   Right Ear: External ear normal.   Left Ear: External ear normal.   Nose: Nose normal.   Eyes: Conjunctivae and EOM are normal. Pupils are equal, round, and reactive to light. No scleral icterus.      Neck: Normal range of motion. Neck supple. No tracheal deviation present. No thyromegaly present.      Pulmonary/Chest: Effort normal and breath sounds normal. No respiratory distress. She has no wheezes. She has no rales. She exhibits no tenderness.      Cardiovascular: Regular, rate and rhythm. No JVD.     Abdominal: Soft. Bowel sounds are normal. She exhibits no distension and no mass. No tenderness. She has no rebound and no guarding.      Breast:  deferred    Genitourinary:  Pelvic exam was performed with patient supine.  External genitalia slightly erythematous, no edema, no abnormal skin changes other than two scars on the mons, patients right, where she has two cysts drained, no evidence of  labial fusion,rash, tenderness, lesion or injury to the labia bilaterally.  Vagina is moist with no lesions, foul discharge, mild erythema, no tenderness, old brown blood present. No foreign body around the vagina or signs of injury.   Cervix exhibits no motion tenderness, no discharge and no friability. Palpable, non-tender, pencil tip sized, firm masses x 2 on cervix at 7 o'clock. Not visible with speculum exam.  Anus no lesions, erythema or tenderness, small hemorrhoid noted.   Uterus is midline not deviated, not enlarged, not fixed and not tender.  Right adnexum displays no mass, no tenderness and no fullness. Left adnexum displays no mass, no tenderness and no fullness.      Musculoskeletal: Normal range of motion. She exhibits no edema and no tenderness.     Lymphadenopathy: She has no cervical adenopathy.     Neurological: She is alert and oriented to person, place, and time. She exhibits normal muscle tone.      Skin: Skin is warm and dry. No rash noted. She is not diaphoretic. No  erythema. No pallor.     Psychiatric: She has a normal mood and affect. Her behavior is normal. Judgment and thought content normal.            Assessment:   -Abnormal gynecologic exam  -Due for pap  -R/o recurrence of endometrial polyps and/or cervical polyps as cause of intermenstrual bleeding  -Vulvar dermatitis vs lichen  -High normal BP     Plan:   Pap collected  Clobetasol cream 0.025% to vulva and perineum BID x 4 weeks.   Neurontin 100mg PO BID.  Pelvic US to r/o cervical and/or endometrial polyps  Will check TSH; vitamin D, B levels at pt request  Monthly SBE.  Will continue to track intermenstrual bleeding  Discussed sitz baths prior to clobetasol  HPV Vaccine deferred d/t age  Establish primary care

## 2021-02-03 ENCOUNTER — HOSPITAL ENCOUNTER (OUTPATIENT)
Dept: LAB | Facility: MEDICAL CENTER | Age: 31
End: 2021-02-03
Attending: NURSE PRACTITIONER
Payer: COMMERCIAL

## 2021-02-03 DIAGNOSIS — L29.2 VULVAR ITCHING: ICD-10-CM

## 2021-02-03 DIAGNOSIS — Z01.419 WELL WOMAN EXAM WITH ROUTINE GYNECOLOGICAL EXAM: ICD-10-CM

## 2021-02-03 PROCEDURE — 82306 VITAMIN D 25 HYDROXY: CPT

## 2021-02-03 PROCEDURE — 36415 COLL VENOUS BLD VENIPUNCTURE: CPT

## 2021-02-03 PROCEDURE — 82607 VITAMIN B-12: CPT

## 2021-02-03 PROCEDURE — 86780 TREPONEMA PALLIDUM: CPT

## 2021-02-03 PROCEDURE — 87340 HEPATITIS B SURFACE AG IA: CPT

## 2021-02-03 PROCEDURE — 84443 ASSAY THYROID STIM HORMONE: CPT

## 2021-02-03 PROCEDURE — 87389 HIV-1 AG W/HIV-1&-2 AB AG IA: CPT

## 2021-02-03 PROCEDURE — 84207 ASSAY OF VITAMIN B-6: CPT

## 2021-02-03 PROCEDURE — 86803 HEPATITIS C AB TEST: CPT

## 2021-02-04 LAB
25(OH)D3 SERPL-MCNC: 37 NG/ML (ref 30–100)
C TRACH DNA GENITAL QL NAA+PROBE: NEGATIVE
CYTOLOGY REG CYTOL: NORMAL
HBV SURFACE AG SER QL: NORMAL
HCV AB SER QL: NORMAL
HIV 1+2 AB+HIV1 P24 AG SERPL QL IA: NORMAL
HPV HR 12 DNA CVX QL NAA+PROBE: NEGATIVE
HPV16 DNA SPEC QL NAA+PROBE: NEGATIVE
HPV18 DNA SPEC QL NAA+PROBE: NEGATIVE
N GONORRHOEA DNA GENITAL QL NAA+PROBE: NEGATIVE
SPECIMEN SOURCE: NORMAL
SPECIMEN SOURCE: NORMAL
TREPONEMA PALLIDUM IGG+IGM AB [PRESENCE] IN SERUM OR PLASMA BY IMMUNOASSAY: NORMAL
TSH SERPL DL<=0.005 MIU/L-ACNC: 0.9 UIU/ML (ref 0.38–5.33)
VIT B12 SERPL-MCNC: 587 PG/ML (ref 211–911)

## 2021-02-07 LAB — VIT B6 SERPL-MCNC: 166.3 NMOL/L (ref 20–125)

## 2021-02-11 ENCOUNTER — HOSPITAL ENCOUNTER (OUTPATIENT)
Dept: RADIOLOGY | Facility: MEDICAL CENTER | Age: 31
End: 2021-02-11
Attending: NURSE PRACTITIONER
Payer: COMMERCIAL

## 2021-02-11 DIAGNOSIS — L29.2 VULVAR ITCHING: ICD-10-CM

## 2021-02-11 PROCEDURE — 76830 TRANSVAGINAL US NON-OB: CPT

## 2021-02-11 RX ORDER — HALOBETASOL PROPIONATE 0.05 %
OINTMENT (GRAM) TOPICAL
Qty: 30 G | Refills: 0 | Status: SHIPPED | OUTPATIENT
Start: 2021-02-11 | End: 2021-11-12

## 2021-02-13 ENCOUNTER — IMMUNIZATION (OUTPATIENT)
Dept: FAMILY PLANNING/WOMEN'S HEALTH CLINIC | Facility: IMMUNIZATION CENTER | Age: 31
End: 2021-02-13
Attending: INTERNAL MEDICINE
Payer: COMMERCIAL

## 2021-02-13 DIAGNOSIS — Z23 ENCOUNTER FOR VACCINATION: Primary | ICD-10-CM

## 2021-02-13 PROCEDURE — 0002A PFIZER SARS-COV-2 VACCINE: CPT

## 2021-02-13 PROCEDURE — 91300 PFIZER SARS-COV-2 VACCINE: CPT

## 2021-03-05 ENCOUNTER — APPOINTMENT (OUTPATIENT)
Dept: MEDICAL GROUP | Facility: IMAGING CENTER | Age: 31
End: 2021-03-05

## 2021-03-10 ENCOUNTER — GYNECOLOGY VISIT (OUTPATIENT)
Dept: OBGYN | Facility: CLINIC | Age: 31
End: 2021-03-10
Payer: COMMERCIAL

## 2021-03-10 ENCOUNTER — TELEPHONE (OUTPATIENT)
Dept: MEDICAL GROUP | Facility: LAB | Age: 31
End: 2021-03-10

## 2021-03-10 VITALS — WEIGHT: 148 LBS | BODY MASS INDEX: 23.18 KG/M2 | DIASTOLIC BLOOD PRESSURE: 82 MMHG | SYSTOLIC BLOOD PRESSURE: 122 MMHG

## 2021-03-10 DIAGNOSIS — G62.9 NEUROPATHY: ICD-10-CM

## 2021-03-10 DIAGNOSIS — R10.2 VULVAR PAIN: ICD-10-CM

## 2021-03-10 PROCEDURE — 99213 OFFICE O/P EST LOW 20 MIN: CPT | Performed by: OBSTETRICS & GYNECOLOGY

## 2021-03-10 NOTE — NON-PROVIDER
Pt here for Gyn f/u visit  Pt states no complaints other than the usual  Good#[phone]  Pharmacy verified

## 2021-03-10 NOTE — TELEPHONE ENCOUNTER
Informed patient that Kitty Frias has no available for NP appointments, but can put them on the waiting list for Kitty or we can establish with another provider at [facility]. Informed patient to call us back, in order to make an appointment with us.

## 2021-03-10 NOTE — PROGRESS NOTES
Chief Complaint   Patient presents with   • Gynecologic Exam       History of present illness: 30 y.o.  No LMP recorded. presents with vulvar irritation.  Has had neuropathy around clitoral segura and vulva for past 3 years since moving to [place].  Has erythematous times and doesn't seem correlated to much. 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  Has no other connection to the irritation but it does seem worse after working night shifts.      Started clobetasol nightly for past 2.5 week and have felt improvement. Was a 7/10 and now is 5/10.     Intermenstrual spotting. Had polyps in the past removed by d&c.  Around shama had spotting return.  Had no bleeding last month.      Review of systems:  Pertinent positives documented in HPI and all other systems reviewed & are negative    Past OB History:   OB History    Para Term  AB Living   0 0 0 0 0 0   SAB TAB Ectopic Molar Multiple Live Births   0 0 0 0 0 0       Past Gynecological History  No LMP recorded.  BC or HRT: condoms   Menses: q 26-27days, regular with intermenstrual spotting   Sexually active: yes  Lifetime sexual partners: male and female partners, male partners currently   Sexually transmitted infections: denies  Pap: last , yes hx of abnormal, colpo age 18 and always normal after   History of sexual abuse: denies  Fibroids?: denies  Ovarian cysts?: denies    All PMH, PSH, allergies, social history and FH reviewed and updated today:  History reviewed. No pertinent past medical history.    Past Surgical History:   Procedure Laterality Date   • OTHER  2020    left knee surgery ACL replaced    • ABDOMINAL EXPLORATION      endometrial polyp removal    • OTHER      uterine polyp removed at age 19       Allergies: No Known Allergies    Social History     Socioeconomic History   • Marital status: Single     Spouse name: Not on file   •  Number of children: Not on file   • Years of education: Not on file   • Highest education level: Not on file   Occupational History   • Occupation: midwife   Tobacco Use   • Smoking status: Never Smoker   • Smokeless tobacco: Never Used   Substance and Sexual Activity   • Alcohol use: Yes     Alcohol/week: 1.8 oz     Types: 1 Glasses of wine, 1 Cans of beer, 1 Shots of liquor per week     Comment: social    • Drug use: No   • Sexual activity: Yes     Partners: Male, Female     Birth control/protection: Condom   Other Topics Concern   • Not on file   Social History Narrative   • Not on file     Social Determinants of Health     Financial Resource Strain:    • Difficulty of Paying Living Expenses:    Food Insecurity:    • Worried About Running Out of Food in the Last Year:    • Ran Out of Food in the Last Year:    Transportation Needs:    • Lack of Transportation (Medical):    • Lack of Transportation (Non-Medical):    Physical Activity:    • Days of Exercise per Week:    • Minutes of Exercise per Session:    Stress:    • Feeling of Stress :    Social Connections:    • Frequency of Communication with Friends and Family:    • Frequency of Social Gatherings with Friends and Family:    • Attends Anab[employer]ist Services:    • Active Member of Clubs or Organizations:    • Attends Club or Organization Meetings:    • Marital Status:    Intimate Partner Violence:    • Fear of Current or Ex-Partner:    • Emotionally Abused:    • Physically Abused:    • Sexually Abused:        Family History   Problem Relation Age of Onset   • Hypertension Mother         heart murmer   • No Known Problems Father    • No Known Problems Sister    • No Known Problems Brother    • Stroke Maternal Grandfather    • Cancer Paternal Grandmother        Physical exam:  /82 (BP Location: Left arm, Patient Position: Sitting, BP Cuff Size: Adult)   Wt 67.1 kg (148 lb)     General:appears stated age, is in no apparent distress, is well developed and well  nourished  Abdomen: nondistended, soft, nontender x4, no rebound or guarding. No organomegaly. No masses.  Female GYN: External genitalia mild erythema in the mid to upper region, more prominent veins noted bilaterally surrounding the clitoral segura it is nontender, nonreproducible sensation  Skin: No rashes, or ulcers or lesions seen  Psychiatric: Patient shows appropriate affect, is alert and oriented x3, intact judgment and insight.      Assessment  30 y.o.  here for vulvar neuropathy   1. Neuropathy     2. Vulvar pain         Plan  - diary to follow triggers  - continue clobetasol for 1 month of treatment   - Consider gabapentin or amitriptyline   - If spotting continues then consider D&C with IUD placement   - Follow up in 1 month to discuss above

## 2021-04-05 ENCOUNTER — APPOINTMENT (OUTPATIENT)
Dept: MEDICAL GROUP | Facility: IMAGING CENTER | Age: 31
End: 2021-04-05

## 2021-04-09 ENCOUNTER — APPOINTMENT (OUTPATIENT)
Dept: MEDICAL GROUP | Facility: IMAGING CENTER | Age: 31
End: 2021-04-09

## 2021-04-13 ENCOUNTER — APPOINTMENT (OUTPATIENT)
Dept: MEDICAL GROUP | Facility: IMAGING CENTER | Age: 31
End: 2021-04-13

## 2021-04-16 ENCOUNTER — APPOINTMENT (OUTPATIENT)
Dept: MEDICAL GROUP | Facility: IMAGING CENTER | Age: 31
End: 2021-04-16
Payer: COMMERCIAL

## 2021-04-19 ENCOUNTER — APPOINTMENT (OUTPATIENT)
Dept: MEDICAL GROUP | Facility: IMAGING CENTER | Age: 31
End: 2021-04-19

## 2021-04-22 ENCOUNTER — APPOINTMENT (OUTPATIENT)
Dept: MEDICAL GROUP | Facility: IMAGING CENTER | Age: 31
End: 2021-04-22

## 2021-08-29 DIAGNOSIS — N92.6 LATE PERIOD: ICD-10-CM

## 2021-08-30 ENCOUNTER — HOSPITAL ENCOUNTER (OUTPATIENT)
Dept: LAB | Facility: MEDICAL CENTER | Age: 31
End: 2021-08-30
Attending: NURSE PRACTITIONER
Payer: COMMERCIAL

## 2021-08-30 DIAGNOSIS — N92.6 LATE PERIOD: ICD-10-CM

## 2021-08-30 LAB — B-HCG SERPL-ACNC: <1 MIU/ML (ref 0–5)

## 2021-08-30 PROCEDURE — 84702 CHORIONIC GONADOTROPIN TEST: CPT

## 2021-08-30 PROCEDURE — 36415 COLL VENOUS BLD VENIPUNCTURE: CPT

## 2021-09-16 ENCOUNTER — TELEPHONE (OUTPATIENT)
Dept: SCHEDULING | Facility: IMAGING CENTER | Age: 31
End: 2021-09-16

## 2021-10-14 ENCOUNTER — EH NON-PROVIDER (OUTPATIENT)
Dept: OCCUPATIONAL MEDICINE | Facility: CLINIC | Age: 31
End: 2021-10-14

## 2021-10-14 ENCOUNTER — APPOINTMENT (RX ONLY)
Dept: URBAN - METROPOLITAN AREA CLINIC 20 | Facility: CLINIC | Age: 31
Setting detail: DERMATOLOGY
End: 2021-10-14

## 2021-10-14 DIAGNOSIS — Z23 NEED FOR VACCINATION: ICD-10-CM

## 2021-10-14 DIAGNOSIS — D22 MELANOCYTIC NEVI: ICD-10-CM

## 2021-10-14 DIAGNOSIS — L81.4 OTHER MELANIN HYPERPIGMENTATION: ICD-10-CM

## 2021-10-14 DIAGNOSIS — L82.1 OTHER SEBORRHEIC KERATOSIS: ICD-10-CM

## 2021-10-14 DIAGNOSIS — L57.8 OTHER SKIN CHANGES DUE TO CHRONIC EXPOSURE TO NONIONIZING RADIATION: ICD-10-CM

## 2021-10-14 DIAGNOSIS — D18.0 HEMANGIOMA: ICD-10-CM

## 2021-10-14 PROBLEM — D22.5 MELANOCYTIC NEVI OF TRUNK: Status: ACTIVE | Noted: 2021-10-14

## 2021-10-14 PROBLEM — D18.01 HEMANGIOMA OF SKIN AND SUBCUTANEOUS TISSUE: Status: ACTIVE | Noted: 2021-10-14

## 2021-10-14 PROBLEM — D48.5 NEOPLASM OF UNCERTAIN BEHAVIOR OF SKIN: Status: ACTIVE | Noted: 2021-10-14

## 2021-10-14 PROCEDURE — ? COUNSELING

## 2021-10-14 PROCEDURE — 99213 OFFICE O/P EST LOW 20 MIN: CPT | Mod: 25

## 2021-10-14 PROCEDURE — ? BIOPSY BY SHAVE METHOD

## 2021-10-14 PROCEDURE — 90686 IIV4 VACC NO PRSV 0.5 ML IM: CPT | Performed by: NURSE PRACTITIONER

## 2021-10-14 PROCEDURE — 11102 TANGNTL BX SKIN SINGLE LES: CPT

## 2021-10-14 PROCEDURE — ? OBSERVATION AND MEASURE

## 2021-10-14 ASSESSMENT — LOCATION DETAILED DESCRIPTION DERM
LOCATION DETAILED: LEFT DISTAL DORSAL FOREARM
LOCATION DETAILED: RIGHT ANTERIOR PROXIMAL THIGH
LOCATION DETAILED: LEFT INFERIOR CENTRAL MALAR CHEEK
LOCATION DETAILED: RIGHT DISTAL DORSAL FOREARM
LOCATION DETAILED: RIGHT MID-UPPER BACK
LOCATION DETAILED: LEFT PROXIMAL CALF
LOCATION DETAILED: LEFT ANTERIOR PROXIMAL THIGH
LOCATION DETAILED: LEFT LATERAL ABDOMEN
LOCATION DETAILED: RIGHT CENTRAL MALAR CHEEK
LOCATION DETAILED: LEFT MEDIAL SUPERIOR CHEST
LOCATION DETAILED: RIGHT ANTERIOR DISTAL THIGH
LOCATION DETAILED: LEFT ANTERIOR PROXIMAL UPPER ARM
LOCATION DETAILED: RIGHT ANTERIOR PROXIMAL UPPER ARM
LOCATION DETAILED: RIGHT SUPERIOR MEDIAL UPPER BACK
LOCATION DETAILED: LEFT ANTERIOR DISTAL THIGH
LOCATION DETAILED: RIGHT PROXIMAL CALF

## 2021-10-14 ASSESSMENT — LOCATION SIMPLE DESCRIPTION DERM
LOCATION SIMPLE: RIGHT UPPER BACK
LOCATION SIMPLE: ABDOMEN
LOCATION SIMPLE: LEFT THIGH
LOCATION SIMPLE: RIGHT THIGH
LOCATION SIMPLE: LEFT CHEEK
LOCATION SIMPLE: RIGHT FOREARM
LOCATION SIMPLE: CHEST
LOCATION SIMPLE: RIGHT CHEEK
LOCATION SIMPLE: LEFT UPPER ARM
LOCATION SIMPLE: LEFT FOREARM
LOCATION SIMPLE: LEFT CALF
LOCATION SIMPLE: RIGHT UPPER ARM
LOCATION SIMPLE: RIGHT CALF

## 2021-10-14 ASSESSMENT — LOCATION ZONE DERM
LOCATION ZONE: ARM
LOCATION ZONE: LEG
LOCATION ZONE: TRUNK
LOCATION ZONE: FACE

## 2021-11-12 ENCOUNTER — OFFICE VISIT (OUTPATIENT)
Dept: MEDICAL GROUP | Facility: IMAGING CENTER | Age: 31
End: 2021-11-12
Payer: COMMERCIAL

## 2021-11-12 VITALS
HEIGHT: 67 IN | OXYGEN SATURATION: 99 % | RESPIRATION RATE: 12 BRPM | HEART RATE: 78 BPM | BODY MASS INDEX: 22.76 KG/M2 | WEIGHT: 145 LBS | SYSTOLIC BLOOD PRESSURE: 118 MMHG | TEMPERATURE: 98.4 F | DIASTOLIC BLOOD PRESSURE: 74 MMHG

## 2021-11-12 DIAGNOSIS — Z23 NEED FOR TDAP VACCINATION: ICD-10-CM

## 2021-11-12 DIAGNOSIS — Z13.1 SCREENING FOR DIABETES MELLITUS: ICD-10-CM

## 2021-11-12 DIAGNOSIS — R51.9 NONINTRACTABLE EPISODIC HEADACHE, UNSPECIFIED HEADACHE TYPE: ICD-10-CM

## 2021-11-12 DIAGNOSIS — R68.89 CHANGE IN BLOOD PRESSURE: ICD-10-CM

## 2021-11-12 DIAGNOSIS — Z76.89 ENCOUNTER TO ESTABLISH CARE: ICD-10-CM

## 2021-11-12 DIAGNOSIS — Z13.220 SCREENING, LIPID: ICD-10-CM

## 2021-11-12 PROCEDURE — 99203 OFFICE O/P NEW LOW 30 MIN: CPT | Mod: 25 | Performed by: FAMILY MEDICINE

## 2021-11-12 PROCEDURE — 90715 TDAP VACCINE 7 YRS/> IM: CPT | Performed by: FAMILY MEDICINE

## 2021-11-12 PROCEDURE — 90471 IMMUNIZATION ADMIN: CPT | Performed by: FAMILY MEDICINE

## 2021-11-12 ASSESSMENT — PAIN SCALES - GENERAL: PAINLEVEL: NO PAIN

## 2021-11-12 ASSESSMENT — PATIENT HEALTH QUESTIONNAIRE - PHQ9: CLINICAL INTERPRETATION OF PHQ2 SCORE: 0

## 2021-11-13 NOTE — PROGRESS NOTES
Chief Complaint   Patient presents with   • Blood Pressure Problem     concerned about higher numbers      HPI:  31 y.o. female new patient here to establish care.   Notes her BP has been elevated 130s when she has checked elsewhere, thus had some concerns.   Previously had some chest tightness- twinges intermittently, randomly.   Improved on its own. No current symptoms.   With more high intensity/exertional exercise may get headache, but not with moderate exercise activities. Hx of headaches as a child, senstive to glucose levels.   Drinks fluids 3-4 L/day.     Lifestyle:  Diet: eats well, vegetables  Activities:Tore acl, faster walks on lunch breaks.   Stressors: overall managing fine  Social Support: good support with family and friends  Work: midwife    Health Maintenance:  Last pap: 2/2021  Immunizations: as below    Immunization History   Administered Date(s) Administered   • Hepatitis A Vaccine, Adult 11/25/2008, 01/04/2011   • Influenza Vaccine Quad Inj (Pf) 10/19/2017, 10/09/2018, 12/12/2019, 10/08/2020, 10/14/2021   • MMR Vaccine 01/17/2006, 06/15/2006   • Pfizer SARS-CoV-2 Vaccine 12+ 01/23/2021, 02/13/2021, 10/09/2021   • Tdap Vaccine 12/14/2010       Review of Systems   Constitutional: Negative for fever, chills and malaise/fatigue.   HENT: Negative for congestion, sore throat, or swallowing issues.   Eyes: Negative for pain or vision changes.   Respiratory: Negative for cough and shortness of breath.    Cardiovascular: Negative for leg swelling. No chest pain.   Gastrointestinal: Negative for nausea, vomiting, abdominal pain and diarrhea.   Genitourinary: Negative for dysuria and hematuria.   Skin: Negative for rash.   Neurological: Negative for dizziness and focal weakness.  Endo/Heme/Allergies: Does not bruise/bleed easily.   Psychiatric/Behavioral: Negative for depression.  The patient is not nervous/anxious.          Current Outpatient Medications:   •  acyclovir (ZOVIRAX) 400 MG tablet, Take 1  "Tab by mouth 3 times a day., Disp: 90 Tab, Rfl: 2  •  ibuprofen (MOTRIN) 200 MG Tab, Take 400 mg by mouth every 6 hours as needed., Disp: , Rfl:     No Known Allergies    Patient Active Problem List   Diagnosis   • Subacute vaginitis   • Recurrent cold sores   • Irregular intermenstrual bleeding   • Endometrial polyp       History reviewed. No pertinent past medical history.    Past Surgical History:   Procedure Laterality Date   • OTHER  02/2020    left knee surgery ACL replaced    • ACL REPAIR Left 2020   • ABDOMINAL EXPLORATION  2010    endometrial polyp removal    • OTHER      uterine polyp removed at age 19       Family History   Problem Relation Age of Onset   • Hypertension Mother         heart murmer   • No Known Problems Father    • No Known Problems Sister    • No Known Problems Brother    • Stroke Maternal Grandfather    • Cancer Paternal Grandmother        Social History     Tobacco Use   • Smoking status: Never Smoker   • Smokeless tobacco: Never Used   Vaping Use   • Vaping Use: Never used   Substance Use Topics   • Alcohol use: Yes     Alcohol/week: 1.2 oz     Types: 2 Standard drinks or equivalent per week     Comment: social    • Drug use: No       PHYSICAL EXAM:  /74 (BP Location: Left arm, Patient Position: Sitting, BP Cuff Size: Adult)   Pulse 78   Temp 36.9 °C (98.4 °F)   Resp 12   Ht 1.702 m (5' 7\")   Wt 65.8 kg (145 lb)   LMP 10/24/2021 (Exact Date)   SpO2 99%   BMI 22.71 kg/m²   Constitutional: She appears well-developed and well-nourished. She appears not diaphoretic. No distress.   HENT: Right Ear: External ear normal. Left Ear: External ear normal. Tympanic membranes clear and intact. Wearing mask.   Eyes: Conjunctivae and extraocular motions are normal. Pupils are equal, round, and reactive to light. No scleral icterus.   Neck: Normal range of motion. Neck supple. No thyromegaly present.   Cardiovascular: Normal rate, regular rhythm, normal heart sounds and intact distal " pulses.  Exam reveals no gallop and no friction rub.  No murmur heard.   Pulmonary/Chest: Effort normal and breath sounds normal. No respiratory distress. She has no wheezes. She has no rales.   Abdominal: Soft. Bowel sounds are normal. She exhibits no distension and no mass. No tenderness. She has no rebound and no guarding.   Lymphadenopathy:  She has no cervical adenopathy.   Neurological: She is alert. She has normal reflexes. No cranial nerve deficit. She exhibits normal muscle tone.   Skin: Skin is warm and dry. No rash noted. She is not diaphoretic. No erythema.   Psychiatric: She has a normal mood and affect. Her behavior is normal.   Musculoskeletal: She exhibits no edema. Full strength throughout. 2+ DTR throughout.       ASSESSMENT/PLAN:    This is a 31 y.o. female new patient.     1. Encounter to establish care     2. Change in blood pressure     3. Nonintractable episodic headache, unspecified headache type     4. Screening for diabetes mellitus  Lipid Profile   5. Screening, lipid  Blood Glucose   6. Need for Tdap vaccination  Tdap Vaccine =>8YO IM   -Change in BP-Patient with concerns with some increase in her baseline when checked. BP in normal range in office today.   Continue to monitor and follow up as needed if continued increase in BP numbers.   Recommend heart healthy diet and routine exercise. Recommend routine labs.   -Episodic headache- with high intensity exertional activities, no issues with moderate level exercise activities. Hx of headaches as child.   Monitor. If continued or worsening symptoms, consider further imaging.   Plan for screening labs. Tdap vaccination done today.    Follow up as needed if any worsening symptoms.     This medical record contains text that has been entered with the assistance of computer voice recognition and dictation software.  Therefore, it may contain unintended errors in text, spelling, punctuation, or grammar.

## 2022-01-17 ENCOUNTER — HOSPITAL ENCOUNTER (OUTPATIENT)
Facility: MEDICAL CENTER | Age: 32
End: 2022-01-17
Payer: COMMERCIAL

## 2022-03-15 ENCOUNTER — GYNECOLOGY VISIT (OUTPATIENT)
Dept: OBGYN | Facility: CLINIC | Age: 32
End: 2022-03-15
Payer: COMMERCIAL

## 2022-03-15 VITALS — DIASTOLIC BLOOD PRESSURE: 84 MMHG | SYSTOLIC BLOOD PRESSURE: 125 MMHG | WEIGHT: 156 LBS | BODY MASS INDEX: 24.43 KG/M2

## 2022-03-15 DIAGNOSIS — Z30.430 ENCOUNTER FOR IUD INSERTION: ICD-10-CM

## 2022-03-15 LAB
INT CON NEG: NORMAL
INT CON POS: NORMAL
POC URINE PREGNANCY TEST: NEGATIVE

## 2022-03-15 PROCEDURE — 58300 INSERT INTRAUTERINE DEVICE: CPT | Performed by: OBSTETRICS & GYNECOLOGY

## 2022-03-15 PROCEDURE — 81025 URINE PREGNANCY TEST: CPT | Performed by: OBSTETRICS & GYNECOLOGY

## 2022-03-15 NOTE — PROCEDURES
31 y.o.    Female presents here today for her IUD insertion:     No LMP recorded.      Today the patient is counseled on the risks of IUD insertion. I also discussed with the patient the risk of infection on insertion, and had asked the patient to remain on pelvic rest for one week following the insertion. We also discussed the risk of IUD expulsion, the risk of uterine perforation and IUD migration. If the IUD does migrate the patient may require a separate procedure such as a laparoscopy to retrieve the migrated IUD. I also discussed the 1% risk of pregnancy with IUD use. Also discussed with patient today increased risk of ectopic pregnancy with IUD use.  Also discussed today the possibility that IUD may need to be removed secondary to bleeding profile or pain. Also discussed were the possibility that partner can feel the IUD during intercourse. I also discussed the side effects of Mirena which can be amenorrhea or dysfunctional uterine bleeding or spotting.  Patient had the opportunity to ask questions regarding insertion, risks and benefits, all questions are answered in their entirety.  Informed consent is signed.    Procedure note  Urine pregnancy test is negative, informed consent was previously signed  The bimanual exam is performed the uterus is noted to be 6 weeks in size and is anteverted position  A speculum was inserted into the vagina, the cervix was cleansed with Betadine swabs x3  Allis clamp was placed on the anterior lip of the cervix  The uterus was sounded to a depth of 8 centimeters  Extreme anteflexed position, dilation was challenging only due to angle required for IUD placement  The IUD is placed under sterile conditions  The strings trimmed to approximately 3 cm  Allis was removed from the cervix and hemostasis was achieved with silver nitrate  Transabdominal US confirmed fundal midline location of Mirena  The patient tolerated the procedure well    Aftercare discussed. Patient is  asked to refrain from coitus or use backup method for 7 days after insertion. She is to return for fever, worsening pelvic pain, abdominal pain, syncope, unusually heavy vaginal bleeding, suspected expulsion, foul smelling vaginal discharge, or pregnancy-like symptoms. If the patient is comfortable she may also perform a digital self examination to check for the strings, although this is not required.    Patient is asked to follow up in 4 to 6 weeks for IUD check.

## 2022-03-19 DIAGNOSIS — K64.4 INFLAMED EXTERNAL HEMORRHOID: Primary | ICD-10-CM

## 2022-03-19 RX ORDER — PRAMOXINE HYDROCHLORIDE 10 MG/G
1 AEROSOL, FOAM TOPICAL 4 TIMES DAILY PRN
Qty: 15 G | Refills: 4 | Status: SHIPPED | OUTPATIENT
Start: 2022-03-19 | End: 2023-12-12

## 2022-03-20 NOTE — PROGRESS NOTES
S: Pt called to report painful external hemorrhoids.  Has tried topical prep H with no relief.  She is currently trying a running program and is unable to continue due to discomfort of same.  No bleeding reported.  No constipation.      A/P:  Will try proctofoam PRN.  Advised increase water intake, may use TUCKS, colace, increase fiber and may use gentle laxative if constipated.

## 2022-03-22 RX ORDER — LIDOCAINE HCL AND HYDROCORTISONE ACETATE 20; 20 MG/G; MG/G
1 CREAM RECTAL 2 TIMES DAILY PRN
Qty: 1 KIT | Refills: 3 | Status: SHIPPED | OUTPATIENT
Start: 2022-03-22 | End: 2023-12-12

## 2022-06-30 ENCOUNTER — GYNECOLOGY VISIT (OUTPATIENT)
Dept: OBGYN | Facility: CLINIC | Age: 32
End: 2022-06-30
Payer: COMMERCIAL

## 2022-06-30 VITALS
DIASTOLIC BLOOD PRESSURE: 80 MMHG | BODY MASS INDEX: 24.01 KG/M2 | HEIGHT: 67 IN | WEIGHT: 153 LBS | SYSTOLIC BLOOD PRESSURE: 129 MMHG | HEART RATE: 88 BPM

## 2022-06-30 DIAGNOSIS — R10.2 VULVAR PAIN: ICD-10-CM

## 2022-06-30 DIAGNOSIS — R10.2 CHRONIC FEMALE PELVIC PAIN: ICD-10-CM

## 2022-06-30 DIAGNOSIS — G89.29 CHRONIC FEMALE PELVIC PAIN: ICD-10-CM

## 2022-06-30 DIAGNOSIS — L29.2 VULVAR ITCHING: Primary | ICD-10-CM

## 2022-06-30 LAB
APPEARANCE UR: NORMAL
BILIRUB UR STRIP-MCNC: NORMAL MG/DL
COLOR UR AUTO: NORMAL
GLUCOSE UR STRIP.AUTO-MCNC: NEGATIVE MG/DL
KETONES UR STRIP.AUTO-MCNC: NEGATIVE MG/DL
LEUKOCYTE ESTERASE UR QL STRIP.AUTO: NORMAL
NITRITE UR QL STRIP.AUTO: NEGATIVE
PH UR STRIP.AUTO: 6 [PH] (ref 5–8)
PROT UR QL STRIP: NEGATIVE MG/DL
RBC UR QL AUTO: NORMAL
SP GR UR STRIP.AUTO: 1.01
UROBILINOGEN UR STRIP-MCNC: NORMAL MG/DL

## 2022-06-30 PROCEDURE — 81002 URINALYSIS NONAUTO W/O SCOPE: CPT | Performed by: STUDENT IN AN ORGANIZED HEALTH CARE EDUCATION/TRAINING PROGRAM

## 2022-06-30 PROCEDURE — 99203 OFFICE O/P NEW LOW 30 MIN: CPT | Performed by: STUDENT IN AN ORGANIZED HEALTH CARE EDUCATION/TRAINING PROGRAM

## 2022-06-30 NOTE — PROGRESS NOTES
"Urogynecology and Pelvic Reconstructive Surgery Consultation Visit    Elzbieta Rao MRN:[id number] :1990    Referred by: self    Reason for Visit:   Chief Complaint   Patient presents with   • New Patient     N2U Consult          Subjective     History of Presenting Illness:    Ms.Lillian RHIANNON Rao is a 32 y.o. year old P0 who presnets for the evaluation and management of vulvar/clitoral pain.      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 She had moved from [place], and grew up in [place].     Her \"flares\" of discomfort are described as tingly, swollen, itchy around the exterior of the vagina (clitoral, labia/vulva to the perineum). No correlation to cycles, food, fluid intake. 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 She has kept a diary of symptoms and there is no specific correlation to what causes flares.  She denies dyspareunia    No other neurological symptoms.      Clobetasol improved somewhat (7/10 to 5/10 pain), but not very significantlly. She used for approx 3 months and has discontinued    IUD placed 3/2022 by Dr Maurice choudhury due to anteflexed uterus. No change in vulvar symptoms since IUD placed. She has no problems with IUD currently    Prior Pelvic surgery:   D&C - h/o polyps     Prior treatment:   Clobetasol - only mild improvement     Pelvic floor symptom review:  No bothersome bladder, bowel, prolapse symptoms      Past medical and surgical history    Past obstetric history: nulligravida     Past medical history:No past medical history on file.  Past surgical history:  Past Surgical History:   Procedure Laterality Date   • OTHER  2020    left knee surgery ACL replaced    • REPAIR, KNEE, ACL Left    • ABDOMINAL EXPLORATION      endometrial polyp removal    • OTHER      uterine polyp removed " "at age 19     Medications:has a current medication list which includes the following prescription(s): acyclovir, ibuprofen, lidocaine-hydrocortisone ace, and pramoxine.  Allergies:Patient has no known allergies.  Family history:  Family History   Problem Relation Age of Onset   • Hypertension Mother         heart murmur   • No Known Problems Father    • No Known Problems Sister    • No Known Problems Brother    • Stroke Maternal Grandfather    • Cancer Paternal Grandmother      Social history: reports that she has never smoked. She has never used smokeless tobacco. She reports current alcohol use of about 1.2 oz of alcohol per week. She reports that she does not use drugs.    Review of systems: A full review of systems was performed, and negative with the exception of want is noted above in the HPI.        Objective        /80 (BP Location: Right arm, Patient Position: Sitting, BP Cuff Size: Adult)   Pulse 88   Ht 1.702 m (5' 7\")   Wt 69.4 kg (153 lb)   BMI 23.96 kg/m²     Physical Exam  Vitals reviewed. Exam conducted with a chaperone present (MA - see notes.).   Constitutional:       Appearance: Normal appearance.   HENT:      Head: Normocephalic.      Mouth/Throat:      Mouth: Mucous membranes are moist.   Cardiovascular:      Rate and Rhythm: Normal rate.   Pulmonary:      Effort: Pulmonary effort is normal.   Skin:     General: Skin is warm and dry.   Neurological:      Mental Status: She is alert.   Psychiatric:         Mood and Affect: Mood normal.     Genitourinary: Deferred today as would not affect management.  Would repeat if no response to conservative management below.    Procedure Performed: No    Diagnostic test and records review:    Documentation reviewed: Prior EMR Records             Assessment & Plan     Ms.Lillian RHIANNON Rao is a 32 y.o. year old P0 with persistent and bothersome vulvar/clitoral pain/irritation/itching without obvious underlying pathology, minimal response to steroids. " We discussed my recommendations for further diagnosis and treatment at length today.     1. Chronic female pelvic pain  2. Vulvar pain  3. Vulvar itching  I counseled that it is difficult to ascertain what exactly is causing this discomfort, as she has no obvious neurological problems, prior trauma or injury to the area, inflammation on prior examinations, and temporal nature of her flares.  She has postulated, and I agree, that there is likely some link to both change in geographic location (humidity) as well as increased stress with current work, which can often negatively augment pain symptoms.  Nevertheless, this is significantly interfering with her comfort and ability to work.  - I referred her to pelvic floor physical therapy ([facility]) in order to try to optimize pelvic girdle and pelvic floor function, if there is any entrapped nerves involved or any pelvic floor hypertonicity that can be addressed.  I counseled that the pelvic floor is complex and often neuromuscular dysfunction can lead to peripheral pain type symptoms.  - I sent a prescription for gabapentin 7% vaginal cream, (faxed to [facility]) which can be applied topically to the affected area.  This would address the nerves directly while minimizing systemic symptoms.  - Next steps in management may include:  topical estrogen therapy if she is hypoestrogenic, systemic gabapentin or amitriptyline, cognitive behavior therapy for pain management, or if no resolution potential vulvar biopsy.  -All questions answered    Return in 3 months or as needed           Kim Rodgers MD, FACOG    Female Pelvic Medicine and Reconstructive Surgery  Department of Obstetrics and Gynecology  [facility]        This medical record contains text that has been entered with the assistance of computer voice recognition and dictation software.  Therefore, it may contain unintended errors in  text, spelling, punctuation, or grammar

## 2022-06-30 NOTE — PROGRESS NOTES
PT here today for consult  PT States chronic vulvar pain onset approx. 4 years  Hysterectomy? X  Good # [phone] (home)   PVR : N/A  Pharmacy Verified

## 2022-09-21 RX ORDER — IMIQUIMOD 12.5 MG/.25G
1 CREAM TOPICAL DAILY
Qty: 1 EACH | Refills: 1 | Status: SHIPPED | OUTPATIENT
Start: 2022-09-21 | End: 2023-12-12

## 2022-09-21 RX ORDER — ACYCLOVIR 400 MG/1
400 TABLET ORAL 3 TIMES DAILY
Qty: 90 TABLET | Refills: 2 | Status: SHIPPED | OUTPATIENT
Start: 2022-09-21 | End: 2023-12-12

## 2022-09-22 ENCOUNTER — TELEPHONE (OUTPATIENT)
Dept: OBGYN | Facility: CLINIC | Age: 32
End: 2022-09-22

## 2022-09-22 NOTE — TELEPHONE ENCOUNTER
Called pharmacy  returning call and spoke to Alise from [facility] regarding Rx Aldara.     Informed Alise Rx should be one tube for 7 days.     Per Alise she understood and will get that started

## 2023-01-07 ENCOUNTER — HOSPITAL ENCOUNTER (EMERGENCY)
Facility: MEDICAL CENTER | Age: 33
End: 2023-01-07
Attending: STUDENT IN AN ORGANIZED HEALTH CARE EDUCATION/TRAINING PROGRAM
Payer: COMMERCIAL

## 2023-01-07 VITALS
OXYGEN SATURATION: 98 % | HEIGHT: 67 IN | SYSTOLIC BLOOD PRESSURE: 127 MMHG | RESPIRATION RATE: 17 BRPM | DIASTOLIC BLOOD PRESSURE: 80 MMHG | HEART RATE: 84 BPM | BODY MASS INDEX: 22.76 KG/M2 | TEMPERATURE: 97.5 F | WEIGHT: 145 LBS

## 2023-01-07 DIAGNOSIS — Z77.21 EXPOSURE TO BLOOD OR BODY FLUID: ICD-10-CM

## 2023-01-07 LAB
ALBUMIN SERPL BCP-MCNC: 5 G/DL (ref 3.2–4.9)
ALBUMIN/GLOB SERPL: 2 G/DL
ALP SERPL-CCNC: 62 U/L (ref 30–99)
ALT SERPL-CCNC: 12 U/L (ref 2–50)
ANION GAP SERPL CALC-SCNC: 11 MMOL/L (ref 7–16)
AST SERPL-CCNC: 14 U/L (ref 12–45)
BILIRUB SERPL-MCNC: 0.3 MG/DL (ref 0.1–1.5)
BUN SERPL-MCNC: 8 MG/DL (ref 8–22)
CALCIUM ALBUM COR SERPL-MCNC: 9 MG/DL (ref 8.5–10.5)
CALCIUM SERPL-MCNC: 9.8 MG/DL (ref 8.5–10.5)
CHLORIDE SERPL-SCNC: 102 MMOL/L (ref 96–112)
CO2 SERPL-SCNC: 23 MMOL/L (ref 20–33)
CREAT SERPL-MCNC: 0.67 MG/DL (ref 0.5–1.4)
GFR SERPLBLD CREATININE-BSD FMLA CKD-EPI: 118 ML/MIN/1.73 M 2
GLOBULIN SER CALC-MCNC: 2.5 G/DL (ref 1.9–3.5)
GLUCOSE SERPL-MCNC: 90 MG/DL (ref 65–99)
HBV CORE AB SERPL QL IA: NONREACTIVE
HBV SURFACE AB SERPL IA-ACNC: 402 MIU/ML (ref 0–10)
HBV SURFACE AG SER QL: ABNORMAL
HCV AB SER QL: ABNORMAL
HIV 1+2 AB+HIV1 P24 AG SERPL QL IA: ABNORMAL
POTASSIUM SERPL-SCNC: 3.9 MMOL/L (ref 3.6–5.5)
PROT SERPL-MCNC: 7.5 G/DL (ref 6–8.2)
SODIUM SERPL-SCNC: 136 MMOL/L (ref 135–145)

## 2023-01-07 PROCEDURE — 86803 HEPATITIS C AB TEST: CPT

## 2023-01-07 PROCEDURE — 36415 COLL VENOUS BLD VENIPUNCTURE: CPT

## 2023-01-07 PROCEDURE — 87389 HIV-1 AG W/HIV-1&-2 AB AG IA: CPT

## 2023-01-07 PROCEDURE — 80053 COMPREHEN METABOLIC PANEL: CPT

## 2023-01-07 PROCEDURE — 86704 HEP B CORE ANTIBODY TOTAL: CPT

## 2023-01-07 PROCEDURE — 86706 HEP B SURFACE ANTIBODY: CPT

## 2023-01-07 PROCEDURE — 87340 HEPATITIS B SURFACE AG IA: CPT

## 2023-01-07 PROCEDURE — 99283 EMERGENCY DEPT VISIT LOW MDM: CPT

## 2023-01-07 NOTE — ED PROVIDER NOTES
ED Provider Note    CHIEF COMPLAINT  Chief Complaint   Patient presents with    Other     Blood exposure to eye. L&D RN. Believes to only be R eye       EXTERNAL RECORDS REVIEWED  Select: Other outpatient Notes    HPI/ROS  LIMITATION TO HISTORY   Select: : None  OUTSIDE HISTORIAN(S):  Select: None    Elzbieta Rao is a 32 y.o. female who presents evaluation after a blood body fluid exposure at work.  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  Patient immediately irrigated her eye, was brought down to the ER for evaluation.      PAST MEDICAL HISTORY       SURGICAL HISTORY   has a past surgical history that includes other; other (02/2020); abdominal exploration (2010); and repair, knee, acl (Left, 2020).    FAMILY HISTORY  Family History   Problem Relation Age of Onset    Hypertension Mother         heart murmur    No Known Problems Father     No Known Problems Sister     No Known Problems Brother     Stroke Maternal Grandfather     Cancer Paternal Grandmother        SOCIAL HISTORY  Social History     Tobacco Use    Smoking status: Never    Smokeless tobacco: Never   Vaping Use    Vaping Use: Never used   Substance and Sexual Activity    Alcohol use: Yes     Alcohol/week: 1.2 oz     Types: 2 Standard drinks or equivalent per week     Comment: social     Drug use: No    Sexual activity: Yes     Partners: Male     Birth control/protection: Condom       CURRENT MEDICATIONS  Home Medications       Reviewed by Mable Lemus R.N. (Registered Nurse) on 01/07/23 at 0112  Med List Status: <None>     Medication Last Dose Status   acyclovir (ZOVIRAX) 400 MG tablet  Active   ibuprofen (MOTRIN) 200 MG Tab  Active   imiquimod (ALDARA) 5 % cream  Active   Lidocaine-Hydrocortisone Ace 2-2 % Kit  Active   pramoxine (PROCTOFOAM) 1 % foam  Active                    ALLERGIES  No Known Allergies    PHYSICAL EXAM  VITAL  "SIGNS: /80   Pulse 84   Temp 36.4 °C (97.5 °F) (Temporal)   Resp 17   Ht 1.702 m (5' 7\")   Wt 65.8 kg (145 lb)   SpO2 98%   BMI 22.71 kg/m²    Pulse ox interpretation: I interpret this pulse ox as normal.  VITALS - vital signs documented prior to this note have been reviewed and noted,  see EHR  GENERAL - awake and alert, no acute distress  HEENT - normocephalic, atraumatic, moist mucus membranes  CARDIOVASCULAR - regular rate and rhythm  PULMONARY - unlabored, no respiratory distress. No audible wheezing or  stridor.  GASTROINTESTINAL - non-tender, non-distended  NEUROLOGIC - mental status normal, speech fluid, cognition normal  MUSCULOSKELETAL -no obvious deformity or swelling  DERMATOLOGIC - warm and dry, no visible rashes  PSYCHIATRIC - normal affect, normal concentration    DIAGNOSTIC STUDIES / PROCEDURES      LABS  CMP negative hepatitis B surface antibody positive indicating immunity no signs of active HIV hepatitis B or hepatitis C infection        COURSE & MEDICAL DECISION MAKING    ED Observation Status? No; Patient does not meet criteria for ED Observation.     INITIAL ASSESSMENT AND PLAN  Care Narrative: Patient presented after a blood body fluid exposure.  Did irrigate her eye copiously prior to arrival.  Exposure was from one of her patients, who did receive routine prenatal care though her exact hepatitis C and HIV status are unclear, labs are being obtained on the patient upstairs though the results are not known at this point.  Her labs were sent, were negative for active infection did show a hepatitis B immunity.  Had a shared decision-making conversation about PEP versus obtaining the patient's HIV and hepatitis C status, and patient preferred to wait on the source labs prior to initiating prophylactic exposure, she did have to go assist in a , thus she was discharged in the emergency department, I did volt her the results of her lab testing, and fortunately the patient's HIV " and hepatitis panels also came back negative thus at this point given the mucocutaneous exposure negative source patient's, I do feel this is a low risk exposure and will defer postexposure prophylaxis at this time did discuss this with the patient and she did feel comfortable following up with occupational health     ADDITIONAL PROBLEM LIST AND DISPOSITION    Problem #1: Body fluid exposure will obtain labs and the source patient's labs have been drawn    I have discussed management of the patient with the following physicians and JOSEF's:  none    Discussion of management with other QHP or appropriate source(s): Select: Patient will be referred to occupational health      Escalation of care considered, and ultimately not performed post exposure prophylaxis    Barriers to care at this time, including but not limited to: Select: None .     Decision tools and prescription drugs considered including, but not limited to: Select: Postexposure prophylaxis .            FINAL DIAGNOSIS  1. Exposure to blood or body fluid           Electronically signed by: Chava Gabriel D.O., 1/7/2023 1:35 AM

## 2023-01-07 NOTE — ED NOTES
Pt completed workers comp paperwork. Pt refused vitals reassessment. Pt instructed to follow up with occupational health. Pt ambulates to exit with steady gait and all belongings

## 2023-01-07 NOTE — ED NOTES
PAR in contact with occupational health. Due to emergency on pt's unit, pt OK to leave after paperwork completion

## 2023-01-07 NOTE — LETTER
"  FORM C-4:  EMPLOYEE’S CLAIM FOR COMPENSATION/ REPORT OF INITIAL TREATMENT  EMPLOYEE’S CLAIM - PROVIDE ALL INFORMATION REQUESTED   First Name Elzbieta Last Name Jalen Birthdate 1990  Sex female Claim Number   Home Address [address]             Zip [address]                                   Age  32 y.o. 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 Fatima   Address or Location of Accident (if applicable) Work [1]   What were you doing at the time of accident? (if applicable) Suturing a vaginia.    How did this injury or occupational disease occur? Be specific and answer in detail. Use additional sheet if necessary)  I was inspectings a pt's vagina after delivering her baby and was squirted with blood in the right eye.   If you believe that you have an occupational disease, when did you first have knowledge of the disability and it relationship to your employment? n/a Witnesses to the Accident  Nurse Yates, Triny Reynoso   Nature of Injury or Occupational Disease  Workers' Compensation Part(s) of Body Injured or Affected  Eye (R), N/A, N/A    I CERTIFY THAT THE ABOVE IS TRUE AND CORRECT TO THE BEST OF MY KNOWLEDGE AND THAT I HAVE PROVIDED THIS INFORMATION IN ORDER TO OBTAIN THE BENEFITS OF [place]).  I HEREBY AUTHORIZE ANY PHYSICIAN, CHIROPRACTOR, SURGEON, PRACTITIONER, OR OTHER PERSON, ANY HOSPITAL, INCLUDING [facility] OR [facility], ANY MEDICAL SERVICE ORGANIZATION, ANY INSURANCE COMPANY, OR OTHER INSTITUTION OR ORGANIZATION TO RELEASE TO EACH OTHER, ANY MEDICAL OR OTHER INFORMATION, INCLUDING BENEFITS PAID OR PAYABLE, PERTINENT TO THIS INJURY OR DISEASE, EXCEPT INFORMATION RELATIVE TO DIAGNOSIS, TREATMENT AND/OR COUNSELING FOR AIDS, PSYCHOLOGICAL CONDITIONS, ALCOHOL OR CONTROLLED SUBSTANCES, FOR WHICH I MUST GIVE SPECIFIC AUTHORIZATION.  A PHOTOSTAT OF THIS AUTHORIZATION SHALL BE AS VALID AS THE ORIGINAL.  Date 01/07/2023                   Place  [facility]                                                                      Employee’s Signature   THIS REPORT MUST BE COMPLETED AND MAILED WITHIN 3 WORKING DAYS OF TREATMENT   Place [facility], EMERGENCY DEPT                       Name of Facility [facility]   Date  1/7/2023 Diagnosis  (Z77.21) Exposure to blood or body fluid Is there evidence the injured employee was under the influence of alcohol and/or another controlled substance at the time of accident?   Hour  1:54 AM Description of Injury or Disease  Exposure to blood or body fluid No   Treatment  Lab draw  Have you advised the patient to remain off work five days or more?         No   X-Ray Findings    If Yes   From Date    To Date      From information given by the employee, together with medical evidence, can you directly connect this injury or occupational disease as job incurred? Yes If No, is employee capable of: Full Duty  Yes Modified Duty      Is additional medical care by a physician indicated? Yes If Modified Duty, Specify any Limitations / Restrictions       Do you know of any previous injury or disease contributing to this condition or occupational disease? No    Date  "1/7/2023 Print Doctor’s Name Chava Gabriel certify the employer’s copy of this form was mailed on:   Address [address] [place] [address]  [place] [id number] [phone] INSURER’S USE ONLY   Provider’s Tax ID Number   Telephone Dept: [phone]    Doctor’s Signature e-CHAVA Blum D.O. Degree  MD      Form C-4 (rev.10/07)                                                                         BRIEF DESCRIPTION OF RIGHTS AND BENEFITS  (Pursuant to [place] 616[place])    Notice of Injury or Occupational Disease (Incident Report Form C-1): If an injury or occupational disease (OD) arises out of and in the course of employment, you must provide written notice to your employer as soon as practicable, but no later than 7 days after the accident or OD. Your employer shall maintain a sufficient supply of the required forms.    Claim for Compensation (Form C-4): If medical treatment is sought, the form C-4 is available at the place of initial treatment. A completed \"Claim for Compensation\" (Form C-4) must be filed within 90 days after an accident or OD. The treating physician or chiropractor must, within 3 working days after treatment, complete and mail to the employer, the employer's insurer and third-party , the Claim for Compensation.    Medical Treatment: If you require medical treatment for your on-the-job injury or OD, you may be required to select a physician or chiropractor from a list provided by your workers’ compensation insurer, if it has contracted with an Organization for Managed Care (MCO) or Preferred Provider Organization (PPO) or providers of health care. If your employer has not entered into a contract with an MCO or PPO, you may select a physician or chiropractor from the Panel of Physicians and Chiropractors. Any medical costs related to your industrial injury or OD will be paid by your insurer.    Temporary Total Disability (TTD): If your doctor has certified that you are unable to work " for a period of at least 5 consecutive days, or 5 cumulative days in a 20-day period, or places restrictions on you that your employer does not accommodate, you may be entitled to TTD compensation.    Temporary Partial Disability (TPD): If the wage you receive upon reemployment is less than the compensation for TTD to which you are entitled, the insurer may be required to pay you TPD compensation to make up the difference. TPD can only be paid for a maximum of 24 months.    Permanent Partial Disability (PPD): When your medical condition is stable and there is an indication of a PPD as a result of your injury or OD, within 30 days, your insurer must arrange for an evaluation by a rating physician or chiropractor to determine the degree of your PPD. The amount of your PPD award depends on the date of injury, the results of the PPD evaluation, your age and wage.    Permanent Total Disability (PTD): If you are medically certified by a treating physician or chiropractor as permanently and totally disabled and have been granted a PTD status by your insurer, you are entitled to receive monthly benefits not to exceed 66 2/3% of your average monthly wage. The amount of your PTD payments is subject to reduction if you previously received a lump-sum PPD award.    Vocational Rehabilitation Services: You may be eligible for vocational rehabilitation services if you are unable to return to the job due to a permanent physical impairment or permanent restrictions as a result of your injury or occupational disease.    Transportation and Per Ad Reimbursement: You may be eligible for travel expenses and per ad associated with medical treatment.    Reopening: You may be able to reopen your claim if your condition worsens after claim closure.     Appeal Process: If you disagree with a written determination issued by the insurer or the insurer does not respond to your request, you may appeal to the Department of Administration,  , by following the instructions contained in your determination letter. You must appeal the determination within 70 days from the date of the determination letter at [address], [address], [place], [place] [address], or [address], [address], [place], [place] [address]. If you disagree with the  decision, you may appeal to the Department of Administration, . You must file your appeal within 30 days from the date of the  decision letter at [address], [address], [place], [place] [address], or [address], [address], [place], [place] [address]. If you disagree with a decision of an , you may file a petition for judicial review with the District Court. You must do so within 30 days of the Appeal Officer’s decision. You may be represented by an  at your own expense or you may contact the [facility] for possible representation.    [place]  for Injured Workers (NAIW): If you disagree with a  decision, you may request that NAIW represent you without charge at an  Hearing. For information regarding denial of benefits, you may contact the [facility] at: [address], [address], [place] [address], [phone], or [address], [place] [address], [phone]    To File a Complaint with the Division: If you wish to file a complaint with the  of the Division of Industrial Relations (DIR),  please contact the Workers’ Compensation Section, [address], [address], [place], [place] [address], telephone [phone], or [address], [place], [place] [address], telephone [phone].    For assistance with Workers’ Compensation Issues: You may contact the [facility] Office for Consumer Health Assistance, [address], [address], [place], [place] [address], Toll Free 1-755.521.9574, Web site:  http://[facility].[place].gov/Eric/ROC E-mail: roc@[facility].[place].gov  D-2 (rev. 10/20)              __________________________________________________________________                                    _________________            Employee Name / Signature                                                                                                                            Date

## 2023-01-07 NOTE — ED TRIAGE NOTES
Pt ambulates to [facility] from L&D for cc below    Chief Complaint   Patient presents with    Other     Blood exposure to eye. L&D RN. Believes to only be R eye     ABCs intact. A+OX4. Denies CP/SOB. Skin PWD. Vitals assessed and WNL.

## 2023-01-10 NOTE — ED NOTES
"ED Positive Culture Follow-up/Notification Note:    Date: 1/9/2023     Patient seen in the ED on 1/7/2023 for evaluation and testing after exposure to a patient's blood. L&D RN was splashed in the eye with a patient's blood, immediately irrigated it, and was brought to the ED. Patient's hepatitis and HIV status are unclear. Patient declined PEP, pending lab results.  1. Exposure to blood or body fluid       Discharge Medication List as of 1/7/2023  2:00 AM          Allergies: Patient has no known allergies.     Vitals:    01/07/23 0110 01/07/23 0115 01/07/23 0116   BP:  127/80    Pulse:   84   Resp:  17    Temp:  36.4 °C (97.5 °F)    TempSrc:  Temporal    SpO2:   98%   Weight: 65.8 kg (145 lb)     Height: 1.702 m (5' 7\")         Final cultures:         Plan:   Patient is negative for HIV, hepatitis B, and hepatitis C. Physician informed the patient about her test results already, and did confirm the patient will follow up with occupational health. No changes required.    Juan Carlos Martinez, PharmD   "

## 2023-02-07 ENCOUNTER — TELEPHONE (OUTPATIENT)
Dept: OBGYN | Facility: CLINIC | Age: 33
End: 2023-02-07
Payer: COMMERCIAL

## 2023-02-07 ENCOUNTER — HOSPITAL ENCOUNTER (OUTPATIENT)
Facility: MEDICAL CENTER | Age: 33
End: 2023-02-07
Attending: NURSE PRACTITIONER
Payer: COMMERCIAL

## 2023-02-07 DIAGNOSIS — Z70.8 COUNSELING FOR SEXUALLY TRANSMITTED DISEASE: ICD-10-CM

## 2023-02-07 PROCEDURE — 87480 CANDIDA DNA DIR PROBE: CPT

## 2023-02-07 PROCEDURE — 87591 N.GONORRHOEAE DNA AMP PROB: CPT

## 2023-02-07 PROCEDURE — 87660 TRICHOMONAS VAGIN DIR PROBE: CPT

## 2023-02-07 PROCEDURE — 87491 CHLMYD TRACH DNA AMP PROBE: CPT

## 2023-02-07 PROCEDURE — 87510 GARDNER VAG DNA DIR PROBE: CPT

## 2023-02-08 PROBLEM — B37.9 YEAST INFECTION: Status: ACTIVE | Noted: 2023-02-08

## 2023-02-08 PROBLEM — B96.89 BV (BACTERIAL VAGINOSIS): Status: ACTIVE | Noted: 2023-02-08

## 2023-02-08 PROBLEM — N76.0 BV (BACTERIAL VAGINOSIS): Status: ACTIVE | Noted: 2023-02-08

## 2023-02-08 LAB
C TRACH DNA GENITAL QL NAA+PROBE: NEGATIVE
CANDIDA DNA VAG QL PROBE+SIG AMP: POSITIVE
G VAGINALIS DNA VAG QL PROBE+SIG AMP: POSITIVE
N GONORRHOEA DNA GENITAL QL NAA+PROBE: NEGATIVE
SPECIMEN SOURCE: NORMAL
T VAGINALIS DNA VAG QL PROBE+SIG AMP: NEGATIVE

## 2023-02-24 ENCOUNTER — APPOINTMENT (RX ONLY)
Dept: URBAN - METROPOLITAN AREA CLINIC 20 | Facility: CLINIC | Age: 33
Setting detail: DERMATOLOGY
End: 2023-02-24

## 2023-02-24 DIAGNOSIS — L57.8 OTHER SKIN CHANGES DUE TO CHRONIC EXPOSURE TO NONIONIZING RADIATION: ICD-10-CM

## 2023-02-24 DIAGNOSIS — D18.0 HEMANGIOMA: ICD-10-CM

## 2023-02-24 DIAGNOSIS — L82.1 OTHER SEBORRHEIC KERATOSIS: ICD-10-CM

## 2023-02-24 DIAGNOSIS — L663 OTHER SPECIFIED DISEASES OF HAIR AND HAIR FOLLICLES: ICD-10-CM

## 2023-02-24 DIAGNOSIS — L81.4 OTHER MELANIN HYPERPIGMENTATION: ICD-10-CM

## 2023-02-24 DIAGNOSIS — D22 MELANOCYTIC NEVI: ICD-10-CM

## 2023-02-24 DIAGNOSIS — L73.9 FOLLICULAR DISORDER, UNSPECIFIED: ICD-10-CM

## 2023-02-24 DIAGNOSIS — L738 OTHER SPECIFIED DISEASES OF HAIR AND HAIR FOLLICLES: ICD-10-CM

## 2023-02-24 PROBLEM — D22.5 MELANOCYTIC NEVI OF TRUNK: Status: ACTIVE | Noted: 2023-02-24

## 2023-02-24 PROBLEM — D18.01 HEMANGIOMA OF SKIN AND SUBCUTANEOUS TISSUE: Status: ACTIVE | Noted: 2023-02-24

## 2023-02-24 PROBLEM — L02.12 FURUNCLE OF NECK: Status: ACTIVE | Noted: 2023-02-24

## 2023-02-24 PROCEDURE — ? OBSERVATION AND MEASURE

## 2023-02-24 PROCEDURE — 99213 OFFICE O/P EST LOW 20 MIN: CPT

## 2023-02-24 PROCEDURE — ? COUNSELING

## 2023-02-24 ASSESSMENT — LOCATION SIMPLE DESCRIPTION DERM
LOCATION SIMPLE: LEFT THIGH
LOCATION SIMPLE: LEFT CHEEK
LOCATION SIMPLE: LEFT CALF
LOCATION SIMPLE: NOSE
LOCATION SIMPLE: RIGHT CALF
LOCATION SIMPLE: RIGHT THIGH
LOCATION SIMPLE: POSTERIOR NECK
LOCATION SIMPLE: LEFT UPPER ARM
LOCATION SIMPLE: ABDOMEN
LOCATION SIMPLE: LEFT FOREARM
LOCATION SIMPLE: CHEST
LOCATION SIMPLE: RIGHT FOREARM
LOCATION SIMPLE: RIGHT UPPER BACK
LOCATION SIMPLE: RIGHT UPPER ARM
LOCATION SIMPLE: RIGHT CHEEK

## 2023-02-24 ASSESSMENT — LOCATION DETAILED DESCRIPTION DERM
LOCATION DETAILED: LEFT INFERIOR CENTRAL MALAR CHEEK
LOCATION DETAILED: RIGHT PROXIMAL CALF
LOCATION DETAILED: LEFT ANTERIOR PROXIMAL THIGH
LOCATION DETAILED: RIGHT ANTERIOR DISTAL THIGH
LOCATION DETAILED: LEFT ANTERIOR PROXIMAL UPPER ARM
LOCATION DETAILED: LEFT ANTERIOR DISTAL THIGH
LOCATION DETAILED: LEFT DISTAL DORSAL FOREARM
LOCATION DETAILED: RIGHT ANTERIOR PROXIMAL THIGH
LOCATION DETAILED: RIGHT SUPERIOR MEDIAL UPPER BACK
LOCATION DETAILED: RIGHT ANTERIOR PROXIMAL UPPER ARM
LOCATION DETAILED: RIGHT CENTRAL MALAR CHEEK
LOCATION DETAILED: LEFT PROXIMAL CALF
LOCATION DETAILED: RIGHT DISTAL DORSAL FOREARM
LOCATION DETAILED: LEFT LATERAL ABDOMEN
LOCATION DETAILED: LEFT MEDIAL SUPERIOR CHEST
LOCATION DETAILED: RIGHT MID-UPPER BACK
LOCATION DETAILED: LEFT SUPERIOR POSTERIOR NECK
LOCATION DETAILED: NASAL DORSUM

## 2023-02-24 ASSESSMENT — LOCATION ZONE DERM
LOCATION ZONE: ARM
LOCATION ZONE: FACE
LOCATION ZONE: TRUNK
LOCATION ZONE: NOSE
LOCATION ZONE: NECK
LOCATION ZONE: LEG

## 2023-05-18 ENCOUNTER — HOSPITAL ENCOUNTER (OUTPATIENT)
Dept: LAB | Facility: MEDICAL CENTER | Age: 33
End: 2023-05-18
Attending: NURSE PRACTITIONER
Payer: COMMERCIAL

## 2023-05-18 DIAGNOSIS — Z57.8 EMPLOYEE EXPOSURE TO BLOOD: ICD-10-CM

## 2023-05-18 PROCEDURE — 87389 HIV-1 AG W/HIV-1&-2 AB AG IA: CPT

## 2023-05-18 PROCEDURE — 36415 COLL VENOUS BLD VENIPUNCTURE: CPT

## 2023-05-18 PROCEDURE — 86780 TREPONEMA PALLIDUM: CPT

## 2023-05-18 PROCEDURE — 80074 ACUTE HEPATITIS PANEL: CPT

## 2023-05-18 SDOH — HEALTH STABILITY - PHYSICAL HEALTH: OCCUPATIONAL EXPOSURE TO OTHER RISK FACTORS: Z57.8

## 2023-05-19 LAB
HAV IGM SERPL QL IA: NORMAL
HBV CORE IGM SER QL: NORMAL
HBV SURFACE AG SER QL: NORMAL
HCV AB SER QL: NORMAL
HIV 1+2 AB+HIV1 P24 AG SERPL QL IA: NORMAL
T PALLIDUM AB SER QL IA: NORMAL

## 2023-06-30 ENCOUNTER — EH NON-PROVIDER (OUTPATIENT)
Dept: OCCUPATIONAL MEDICINE | Facility: CLINIC | Age: 33
End: 2023-06-30

## 2023-12-12 ENCOUNTER — HOSPITAL ENCOUNTER (INPATIENT)
Facility: MEDICAL CENTER | Age: 33
LOS: 2 days | DRG: 301 | End: 2023-12-14
Attending: EMERGENCY MEDICINE | Admitting: INTERNAL MEDICINE
Payer: COMMERCIAL

## 2023-12-12 ENCOUNTER — APPOINTMENT (OUTPATIENT)
Dept: RADIOLOGY | Facility: MEDICAL CENTER | Age: 33
DRG: 301 | End: 2023-12-12
Attending: EMERGENCY MEDICINE
Payer: COMMERCIAL

## 2023-12-12 DIAGNOSIS — I77.71 INTERNAL CAROTID ARTERY DISSECTION (HCC): ICD-10-CM

## 2023-12-12 DIAGNOSIS — I77.71 CAROTID ARTERY DISSECTION (HCC): ICD-10-CM

## 2023-12-12 LAB
ALBUMIN SERPL BCP-MCNC: 5.1 G/DL (ref 3.2–4.9)
ALBUMIN/GLOB SERPL: 1.8 G/DL
ALP SERPL-CCNC: 61 U/L (ref 30–99)
ALT SERPL-CCNC: 10 U/L (ref 2–50)
ANION GAP SERPL CALC-SCNC: 13 MMOL/L (ref 7–16)
APTT PPP: 30.8 SEC (ref 24.7–36)
AST SERPL-CCNC: 10 U/L (ref 12–45)
BASOPHILS # BLD AUTO: 0.3 % (ref 0–1.8)
BASOPHILS # BLD: 0.02 K/UL (ref 0–0.12)
BILIRUB SERPL-MCNC: 0.4 MG/DL (ref 0.1–1.5)
BUN SERPL-MCNC: 9 MG/DL (ref 8–22)
CALCIUM ALBUM COR SERPL-MCNC: 8.9 MG/DL (ref 8.5–10.5)
CALCIUM SERPL-MCNC: 9.8 MG/DL (ref 8.5–10.5)
CHLORIDE SERPL-SCNC: 103 MMOL/L (ref 96–112)
CO2 SERPL-SCNC: 25 MMOL/L (ref 20–33)
CREAT SERPL-MCNC: 0.76 MG/DL (ref 0.5–1.4)
EOSINOPHIL # BLD AUTO: 0.01 K/UL (ref 0–0.51)
EOSINOPHIL NFR BLD: 0.2 % (ref 0–6.9)
ERYTHROCYTE [DISTWIDTH] IN BLOOD BY AUTOMATED COUNT: 42.3 FL (ref 35.9–50)
FLUAV RNA SPEC QL NAA+PROBE: NEGATIVE
FLUBV RNA SPEC QL NAA+PROBE: NEGATIVE
GFR SERPLBLD CREATININE-BSD FMLA CKD-EPI: 105 ML/MIN/1.73 M 2
GLOBULIN SER CALC-MCNC: 2.9 G/DL (ref 1.9–3.5)
GLUCOSE SERPL-MCNC: 135 MG/DL (ref 65–99)
HCG SERPL QL: NEGATIVE
HCT VFR BLD AUTO: 46.5 % (ref 37–47)
HGB BLD-MCNC: 15.9 G/DL (ref 12–16)
IMM GRANULOCYTES # BLD AUTO: 0.01 K/UL (ref 0–0.11)
IMM GRANULOCYTES NFR BLD AUTO: 0.2 % (ref 0–0.9)
INR PPP: 1.04 (ref 0.87–1.13)
LYMPHOCYTES # BLD AUTO: 1.37 K/UL (ref 1–4.8)
LYMPHOCYTES NFR BLD: 20.7 % (ref 22–41)
MCH RBC QN AUTO: 32.2 PG (ref 27–33)
MCHC RBC AUTO-ENTMCNC: 34.2 G/DL (ref 32.2–35.5)
MCV RBC AUTO: 94.1 FL (ref 81.4–97.8)
MONOCYTES # BLD AUTO: 0.33 K/UL (ref 0–0.85)
MONOCYTES NFR BLD AUTO: 5 % (ref 0–13.4)
NEUTROPHILS # BLD AUTO: 4.88 K/UL (ref 1.82–7.42)
NEUTROPHILS NFR BLD: 73.6 % (ref 44–72)
NRBC # BLD AUTO: 0 K/UL
NRBC BLD-RTO: 0 /100 WBC (ref 0–0.2)
PLATELET # BLD AUTO: 320 K/UL (ref 164–446)
PMV BLD AUTO: 9.8 FL (ref 9–12.9)
POTASSIUM SERPL-SCNC: 3.6 MMOL/L (ref 3.6–5.5)
PROT SERPL-MCNC: 8 G/DL (ref 6–8.2)
PROTHROMBIN TIME: 13.7 SEC (ref 12–14.6)
RBC # BLD AUTO: 4.94 M/UL (ref 4.2–5.4)
RSV RNA SPEC QL NAA+PROBE: NEGATIVE
SARS-COV-2 RNA RESP QL NAA+PROBE: NOTDETECTED
SODIUM SERPL-SCNC: 141 MMOL/L (ref 135–145)
SPECIMEN SOURCE: NORMAL
UFH PPP CHRO-ACNC: 0.13 IU/ML
UFH PPP CHRO-ACNC: <0.1 IU/ML
WBC # BLD AUTO: 6.6 K/UL (ref 4.8–10.8)

## 2023-12-12 PROCEDURE — 70498 CT ANGIOGRAPHY NECK: CPT

## 2023-12-12 PROCEDURE — C9803 HOPD COVID-19 SPEC COLLECT: HCPCS | Performed by: EMERGENCY MEDICINE

## 2023-12-12 PROCEDURE — A9270 NON-COVERED ITEM OR SERVICE: HCPCS | Performed by: INTERNAL MEDICINE

## 2023-12-12 PROCEDURE — 770000 HCHG ROOM/CARE - INTERMEDIATE ICU *

## 2023-12-12 PROCEDURE — 85520 HEPARIN ASSAY: CPT

## 2023-12-12 PROCEDURE — 99285 EMERGENCY DEPT VISIT HI MDM: CPT

## 2023-12-12 PROCEDURE — 85610 PROTHROMBIN TIME: CPT

## 2023-12-12 PROCEDURE — 96365 THER/PROPH/DIAG IV INF INIT: CPT

## 2023-12-12 PROCEDURE — 85025 COMPLETE CBC W/AUTO DIFF WBC: CPT

## 2023-12-12 PROCEDURE — 99254 IP/OBS CNSLTJ NEW/EST MOD 60: CPT | Performed by: STUDENT IN AN ORGANIZED HEALTH CARE EDUCATION/TRAINING PROGRAM

## 2023-12-12 PROCEDURE — 96375 TX/PRO/DX INJ NEW DRUG ADDON: CPT

## 2023-12-12 PROCEDURE — 85730 THROMBOPLASTIN TIME PARTIAL: CPT

## 2023-12-12 PROCEDURE — 84703 CHORIONIC GONADOTROPIN ASSAY: CPT

## 2023-12-12 PROCEDURE — 99223 1ST HOSP IP/OBS HIGH 75: CPT | Mod: AI | Performed by: INTERNAL MEDICINE

## 2023-12-12 PROCEDURE — 96366 THER/PROPH/DIAG IV INF ADDON: CPT

## 2023-12-12 PROCEDURE — 70551 MRI BRAIN STEM W/O DYE: CPT

## 2023-12-12 PROCEDURE — 36415 COLL VENOUS BLD VENIPUNCTURE: CPT

## 2023-12-12 PROCEDURE — 0241U HCHG SARS-COV-2 COVID-19 NFCT DS RESP RNA 4 TRGT MIC: CPT

## 2023-12-12 PROCEDURE — 700111 HCHG RX REV CODE 636 W/ 250 OVERRIDE (IP): Performed by: INTERNAL MEDICINE

## 2023-12-12 PROCEDURE — 700102 HCHG RX REV CODE 250 W/ 637 OVERRIDE(OP): Performed by: INTERNAL MEDICINE

## 2023-12-12 PROCEDURE — 700111 HCHG RX REV CODE 636 W/ 250 OVERRIDE (IP): Mod: JZ | Performed by: EMERGENCY MEDICINE

## 2023-12-12 PROCEDURE — 700105 HCHG RX REV CODE 258: Performed by: EMERGENCY MEDICINE

## 2023-12-12 PROCEDURE — 80053 COMPREHEN METABOLIC PANEL: CPT

## 2023-12-12 PROCEDURE — 700117 HCHG RX CONTRAST REV CODE 255: Performed by: EMERGENCY MEDICINE

## 2023-12-12 PROCEDURE — 70496 CT ANGIOGRAPHY HEAD: CPT

## 2023-12-12 RX ORDER — ACETAMINOPHEN 500 MG
500-1000 TABLET ORAL EVERY 6 HOURS PRN
COMMUNITY
End: 2024-02-12

## 2023-12-12 RX ORDER — PROMETHAZINE HYDROCHLORIDE 25 MG/1
12.5-25 SUPPOSITORY RECTAL EVERY 4 HOURS PRN
Status: DISCONTINUED | OUTPATIENT
Start: 2023-12-12 | End: 2023-12-14 | Stop reason: HOSPADM

## 2023-12-12 RX ORDER — HEPARIN SODIUM 5000 [USP'U]/100ML
0-30 INJECTION, SOLUTION INTRAVENOUS CONTINUOUS
Status: DISCONTINUED | OUTPATIENT
Start: 2023-12-12 | End: 2023-12-14

## 2023-12-12 RX ORDER — SODIUM CHLORIDE 9 MG/ML
1000 INJECTION, SOLUTION INTRAVENOUS ONCE
Status: COMPLETED | OUTPATIENT
Start: 2023-12-12 | End: 2023-12-12

## 2023-12-12 RX ORDER — KETOROLAC TROMETHAMINE 30 MG/ML
30 INJECTION, SOLUTION INTRAMUSCULAR; INTRAVENOUS ONCE
Status: DISCONTINUED | OUTPATIENT
Start: 2023-12-12 | End: 2023-12-12

## 2023-12-12 RX ORDER — HYDRALAZINE HYDROCHLORIDE 20 MG/ML
10 INJECTION INTRAMUSCULAR; INTRAVENOUS EVERY 6 HOURS PRN
Status: DISCONTINUED | OUTPATIENT
Start: 2023-12-12 | End: 2023-12-14 | Stop reason: HOSPADM

## 2023-12-12 RX ORDER — OXYCODONE HYDROCHLORIDE 5 MG/1
2.5 TABLET ORAL
Status: DISCONTINUED | OUTPATIENT
Start: 2023-12-12 | End: 2023-12-14 | Stop reason: HOSPADM

## 2023-12-12 RX ORDER — AMOXICILLIN 250 MG
2 CAPSULE ORAL 2 TIMES DAILY
Status: DISCONTINUED | OUTPATIENT
Start: 2023-12-12 | End: 2023-12-14 | Stop reason: HOSPADM

## 2023-12-12 RX ORDER — POLYETHYLENE GLYCOL 3350 17 G/17G
1 POWDER, FOR SOLUTION ORAL
Status: DISCONTINUED | OUTPATIENT
Start: 2023-12-12 | End: 2023-12-14 | Stop reason: HOSPADM

## 2023-12-12 RX ORDER — ACETAMINOPHEN 500 MG
1000 TABLET ORAL EVERY 6 HOURS PRN
Status: DISCONTINUED | OUTPATIENT
Start: 2023-12-17 | End: 2023-12-14 | Stop reason: HOSPADM

## 2023-12-12 RX ORDER — PROMETHAZINE HYDROCHLORIDE 25 MG/1
12.5-25 TABLET ORAL EVERY 4 HOURS PRN
Status: DISCONTINUED | OUTPATIENT
Start: 2023-12-12 | End: 2023-12-14 | Stop reason: HOSPADM

## 2023-12-12 RX ORDER — HYDROMORPHONE HYDROCHLORIDE 1 MG/ML
0.25 INJECTION, SOLUTION INTRAMUSCULAR; INTRAVENOUS; SUBCUTANEOUS
Status: DISCONTINUED | OUTPATIENT
Start: 2023-12-12 | End: 2023-12-14 | Stop reason: HOSPADM

## 2023-12-12 RX ORDER — PROCHLORPERAZINE EDISYLATE 5 MG/ML
5-10 INJECTION INTRAMUSCULAR; INTRAVENOUS EVERY 4 HOURS PRN
Status: DISCONTINUED | OUTPATIENT
Start: 2023-12-12 | End: 2023-12-14 | Stop reason: HOSPADM

## 2023-12-12 RX ORDER — DEXAMETHASONE SODIUM PHOSPHATE 4 MG/ML
12 INJECTION, SOLUTION INTRA-ARTICULAR; INTRALESIONAL; INTRAMUSCULAR; INTRAVENOUS; SOFT TISSUE ONCE
Status: DISCONTINUED | OUTPATIENT
Start: 2023-12-12 | End: 2023-12-12

## 2023-12-12 RX ORDER — OXYCODONE HYDROCHLORIDE 5 MG/1
5 TABLET ORAL
Status: DISCONTINUED | OUTPATIENT
Start: 2023-12-12 | End: 2023-12-14 | Stop reason: HOSPADM

## 2023-12-12 RX ORDER — ONDANSETRON 2 MG/ML
4 INJECTION INTRAMUSCULAR; INTRAVENOUS ONCE
Status: COMPLETED | OUTPATIENT
Start: 2023-12-12 | End: 2023-12-12

## 2023-12-12 RX ORDER — ONDANSETRON 4 MG/1
4 TABLET, ORALLY DISINTEGRATING ORAL EVERY 4 HOURS PRN
Status: DISCONTINUED | OUTPATIENT
Start: 2023-12-12 | End: 2023-12-14 | Stop reason: HOSPADM

## 2023-12-12 RX ORDER — BISACODYL 10 MG
10 SUPPOSITORY, RECTAL RECTAL
Status: DISCONTINUED | OUTPATIENT
Start: 2023-12-12 | End: 2023-12-14 | Stop reason: HOSPADM

## 2023-12-12 RX ORDER — LABETALOL HYDROCHLORIDE 5 MG/ML
10 INJECTION, SOLUTION INTRAVENOUS EVERY 4 HOURS PRN
Status: DISCONTINUED | OUTPATIENT
Start: 2023-12-12 | End: 2023-12-14 | Stop reason: HOSPADM

## 2023-12-12 RX ORDER — ACETAMINOPHEN 500 MG
1000 TABLET ORAL EVERY 6 HOURS
Status: DISCONTINUED | OUTPATIENT
Start: 2023-12-12 | End: 2023-12-14 | Stop reason: HOSPADM

## 2023-12-12 RX ORDER — ONDANSETRON 2 MG/ML
4 INJECTION INTRAMUSCULAR; INTRAVENOUS EVERY 4 HOURS PRN
Status: DISCONTINUED | OUTPATIENT
Start: 2023-12-12 | End: 2023-12-14 | Stop reason: HOSPADM

## 2023-12-12 RX ADMIN — OXYCODONE HYDROCHLORIDE 5 MG: 5 TABLET ORAL at 19:50

## 2023-12-12 RX ADMIN — SODIUM CHLORIDE 1000 ML: 9 INJECTION, SOLUTION INTRAVENOUS at 09:10

## 2023-12-12 RX ADMIN — OXYCODONE 2.5 MG: 5 TABLET ORAL at 16:38

## 2023-12-12 RX ADMIN — ONDANSETRON 4 MG: 4 TABLET, ORALLY DISINTEGRATING ORAL at 19:50

## 2023-12-12 RX ADMIN — ONDANSETRON 4 MG: 4 TABLET, ORALLY DISINTEGRATING ORAL at 16:37

## 2023-12-12 RX ADMIN — ONDANSETRON 4 MG: 2 INJECTION INTRAMUSCULAR; INTRAVENOUS at 09:16

## 2023-12-12 RX ADMIN — HEPARIN SODIUM 12 UNITS/KG/HR: 5000 INJECTION, SOLUTION INTRAVENOUS at 15:18

## 2023-12-12 RX ADMIN — IOHEXOL 80 ML: 350 INJECTION, SOLUTION INTRAVENOUS at 11:00

## 2023-12-12 RX ADMIN — ACETAMINOPHEN 1000 MG: 500 TABLET, FILM COATED ORAL at 17:46

## 2023-12-12 ASSESSMENT — PAIN DESCRIPTION - PAIN TYPE
TYPE: ACUTE PAIN
TYPE: ACUTE PAIN

## 2023-12-12 ASSESSMENT — ENCOUNTER SYMPTOMS
HEADACHES: 1
NAUSEA: 0
FEVER: 0
VOMITING: 0
CHILLS: 0
SENSORY CHANGE: 0
WEAKNESS: 0
FOCAL WEAKNESS: 0
SPEECH CHANGE: 0
NERVOUS/ANXIOUS: 1
ABDOMINAL PAIN: 0

## 2023-12-12 ASSESSMENT — PATIENT HEALTH QUESTIONNAIRE - PHQ9
2. FEELING DOWN, DEPRESSED, IRRITABLE, OR HOPELESS: NOT AT ALL
1. LITTLE INTEREST OR PLEASURE IN DOING THINGS: NOT AT ALL
SUM OF ALL RESPONSES TO PHQ9 QUESTIONS 1 AND 2: 0

## 2023-12-12 NOTE — CONSULTS
Neurology Initial Consult H&P  Neurohospitalist Service, [facility] Neurosciences    Referring Physician: José Miguel Ramos M.D.    Chief Complaint   Patient presents with    Headache    Light Sensitivity    Eye Pain     left       HPI: Elzbieta Rao is a 33 y.o. w/ known PMHx who presents for evaluation of headache for x5 days and Rt-sided (hand and forearm) sensory loss w/ resolution of symptoms. Follow up CTA imaging demonstrate Lt-ICA cervical artery segment dissection. Patient reports that she developed a moderate to severe left sided headache on Thursday night. Patient has history of chronic migraine headache and thus tried to treat her symptoms conservatively with a round the clock Tylenol and Ibuprofen. She was utilizing higher doses that she typically required for her previous headaches without adequate relief. 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 Upon returning home she had the abrupt onset of right finger numbness that quickly spread to her remaining fingers and then up her forearm to about mid upper forearm before spontaneously aborting. She then decided to present to [facility] for evaluation.    Patient denies any focal weakness, no vision or speech issues. No previous family history of young stroke, bleeding or clotting disorders and no known genetic or auto-immune disorders. She reports her mother has a history of Prinzmetal angina and her grandmother  of a ruptured brain aneurysm. Patient has no known medical disease, denies history of hypertension. Only takes routine multivitamin. Has progesterone containing IUD. Denies smoking, drinks occasionally, 1-2 drinks weekly. Denies illicit substance use. No history of trauma or excessive straining.     Review of systems: In addition to what is detailed in the HPI above, all other systems reviewed and are negative.    Past Medical History:    has  no past medical history on file.    FHx:  family history includes Cancer in her paternal grandmother; Hypertension in her mother; No Known Problems in her brother, father, and sister; Stroke in her maternal grandfather.    SHx:   reports that she has never smoked. She has never used smokeless tobacco. She reports current alcohol use of about 1.2 oz of alcohol per week. She reports that she does not use drugs.    Allergies:  No Known Allergies    Medications:    Current Facility-Administered Medications:     fentaNYL (Sublimaze) injection 50 mcg, 50 mcg, Intravenous, Once, Kimberly Parkinson D.O.    senna-docusate (Pericolace Or Senokot S) 8.6-50 MG per tablet 2 Tablet, 2 Tablet, Oral, BID **AND** polyethylene glycol/lytes (Miralax) Packet 1 Packet, 1 Packet, Oral, QDAY PRN **AND** magnesium hydroxide (Milk Of Magnesia) suspension 30 mL, 30 mL, Oral, QDAY PRN **AND** bisacodyl (Dulcolax) suppository 10 mg, 10 mg, Rectal, QDAY PRN, José Miguel Ramos M.D.    ondansetron (Zofran) syringe/vial injection 4 mg, 4 mg, Intravenous, Q4HRS PRN, José Miguel Ramos M.D.    ondansetron (Zofran ODT) dispertab 4 mg, 4 mg, Oral, Q4HRS PRN, José Miguel Ramos M.D.    promethazine (Phenergan) tablet 12.5-25 mg, 12.5-25 mg, Oral, Q4HRS PRN, José Miguel Ramos M.D.    promethazine (Phenergan) suppository 12.5-25 mg, 12.5-25 mg, Rectal, Q4HRS PRN, José Miguel Ramos M.D.    prochlorperazine (Compazine) injection 5-10 mg, 5-10 mg, Intravenous, Q4HRS PRN, José Miguel Ramos M.D.    Current Outpatient Medications:     fluconazole (DIFLUCAN) 100 MG Tab, Take 1.5 Tablets by mouth every 72 hours as needed (take second dose if still symptomatic after 72 hours)., Disp: 3 Tablet, Rfl: 1    fluconazole (DIFLUCAN) 100 MG Tab, Take 1.5 Tablets by mouth every day. May repeat dosing in 72 hours if still symptomatic, Disp: 3 Tablet, Rfl: 1    acyclovir (ZOVIRAX) 400 MG tablet, Take 1 Tablet by mouth 3 times a day., Disp: 90 Tablet, Rfl: 2    imiquimod (ALDARA)  5 % cream, Apply 1 g topically every day., Disp: 1 Each, Rfl: 1    Lidocaine-Hydrocortisone Ace 2-2 % Kit, Insert 1 Application into the rectum 2 times a day as needed., Disp: 1 Kit, Rfl: 3    pramoxine (PROCTOFOAM) 1 % foam, Insert 1 Application into the rectum 4 times a day as needed., Disp: 15 g, Rfl: 4    ibuprofen (MOTRIN) 200 MG Tab, Take 400 mg by mouth every 6 hours as needed., Disp: , Rfl:       NEUROLOGICAL EXAM:     MENTAL STATUS: Awake, alert and oriented to person, place, time and situation, attention and memory intact  LANG/SPEECH: Naming and repetition intact, fluent speech, follows simple, two-step, multi-step and complex commands.    CRANIAL NERVES:  II: Pupils equal and reactive, no VF deficits  III, IV, VI: EOM intact, no gaze preference or deviation, no nystagmus.  V: normal sensation in V1, V2, and V3 segments bilaterally  VII: no asymmetry, no nasolabial fold flattening  VIII: normal hearing to speech  IX, X: normal palatal elevation, no uvular deviation  XI: 5/5 head turn and 5/5 shoulder shrug bilaterally  XII: midline tongue protrusion    MOTOR: Antigravity movement in bilateral upper and lower extremities. Full and symmetric power in proximal and distal muscle groups in bilateral upper and lower extremities    REFLEXES: 2/4 throughout, bilateral flexor plantar response, no clonus  SENSORY: Normal to fine touch in all extremities, no extinction to double sided stimulation (visual & tactile)  COORD: Normal finger to nose, no tremor, no dysmetria  GAIT: Deferred      NIHSS: National Institutes of Health Stroke Scale    [0] 1a:Level of Consciousness    0-alert 1-drowsy   2-stupor   3-coma  [0] 1b:LOC Questions                  0-both  1-one      2-neither  [0] 1c:LOC Commands                   0-both  1-one      2-neither  [0] 2: Best Gaze                     0-nl    1-partial  2-forced  [0] 3: Visual Fields                   0-nl    1-partial  2-complete 3-bilat  [0] 4: Facial Paresis           "      0-nl    1-minor    2-partial  3-full  MOTOR                       0-nl  [0] 5: Right Arm           1-drift  [0] 6: Left Arm             2-some effort vs gravity  [0] 7: Right Leg           3-no effort vs gravity  [0] 8: Left Leg             4-no movement                             x-untestable  [0] 9: Limb Ataxia                    0-abs   1-1_limb   2-2+_limbs       x-untestable  [0] 10:Sensory                        0-nl    1-partial  2-dense  [0] 11:Best Language/Aphasia         0-nl    1-mild/mod 2-severe   3-mute  [0] 12:Dysarthria                     0-nl    1-mild/mod 2-severe       x-untestable  [0] 13:Neglect/Inattention            0-none  1-partial  2-complete  [0] TOTAL      Objective Data:    Labs:  No results found for: \"PROTHROMBTM\", \"INR\"   Lab Results   Component Value Date/Time    WBC 6.6 12/12/2023 09:11 AM    RBC 4.94 12/12/2023 09:11 AM    HEMOGLOBIN 15.9 12/12/2023 09:11 AM    HEMATOCRIT 46.5 12/12/2023 09:11 AM    MCV 94.1 12/12/2023 09:11 AM    MCH 32.2 12/12/2023 09:11 AM    MCHC 34.2 12/12/2023 09:11 AM    MPV 9.8 12/12/2023 09:11 AM    NEUTSPOLYS 73.60 (H) 12/12/2023 09:11 AM    LYMPHOCYTES 20.70 (L) 12/12/2023 09:11 AM    MONOCYTES 5.00 12/12/2023 09:11 AM    EOSINOPHILS 0.20 12/12/2023 09:11 AM    BASOPHILS 0.30 12/12/2023 09:11 AM      Lab Results   Component Value Date/Time    SODIUM 141 12/12/2023 09:11 AM    POTASSIUM 3.6 12/12/2023 09:11 AM    CHLORIDE 103 12/12/2023 09:11 AM    CO2 25 12/12/2023 09:11 AM    GLUCOSE 135 (H) 12/12/2023 09:11 AM    BUN 9 12/12/2023 09:11 AM    CREATININE 0.76 12/12/2023 09:11 AM      Lab Results   Component Value Date/Time    CHOLSTRLTOT 159 06/18/2019 07:54 AM    LDL 80 06/18/2019 07:54 AM    HDL 67 06/18/2019 07:54 AM    TRIGLYCERIDE 58 06/18/2019 07:54 AM       Lab Results   Component Value Date/Time    ALKPHOSPHAT 61 12/12/2023 09:11 AM    ASTSGOT 10 (L) 12/12/2023 09:11 AM    ALTSGPT 10 12/12/2023 09:11 AM    TBILIRUBIN 0.4 12/12/2023 09:11 " AM        Imaging/Testing:    I interpreted and/or reviewed the patient's neuroimaging    CT-CTA NECK WITH & W/O-POST PROCESSING   Final Result      Flap-like filling defect in the left internal carotid artery petrous segment suspicious for dissection. Findings better seen and evaluated on the dedicated CTA of the head, report to follow.      Findings were conveyed to Dr. Parkinson by Dr. Rodriguez on 12/12/2023 11:23 AM.      CT-CTA HEAD WITH & W/O-POST PROCESS   Final Result   Addendum (preliminary) 1 of 1   Addendum:      The distal cervical ICA and haleigh cavernous segments are asymmetrically    small compared to the right. Just proximal to the skull base there is a    focal partially linear hypodensity in filling defect with associated    luminal narrowing suspicious for    dissection. In the proximal petrous portion in the proximal carotid canal    there likely is a severe high-grade focal stenosis also present.      These findings were discussed with BEBE PARKINSON on 12/12/2023 11:24 AM.      Final      CT angiogram of the [place] [facility] [place] within normal limits.      No acute intracranial abnormality.      MR-BRAIN-W/O    (Results Pending)       Assessment and Plan:   Elzbieta Rao is a 33 y.o. w/ known PMHx who presents for evaluation of headache for x5 days and Rt-sided (hand and forearm) sensory loss w/ resolution of symptoms. Rt-sided sensory symptoms recurred in ED but resolved spontaneously. Follow up MRI w/o evidence for acute ischemia. Recurrent symptoms are highly concerned for exuberant thrombosis w/ potential active clot formation. Patient will be admitted to IMCU for heparin gtt and close neuromonitoring. Internal carotid artery appears to have a tapered appearance which is suggestive of Type II focal FMD, a diagnosis of FMD could help explain spontaneous ICA dissection but would unlikely change her acute or long term management.     Problem list:  -- Spontaneous Lt-ICA  dissection      Recommendations:  -- MRI brain w/o evidence of acute ischemia  -- Start Heparin gtt neurovascular protocol w/ bolus  -- Admit to IMCU for q2 neurochecks  -- STAT CTH for any change in neurologic status; call vascular neurology service immediately  -- Pending clinical course; likely maintain IV heparin for 48 hours and then transition to Eliquis 5 mg BID  -- Upon discharge, would treat with OAC for 3-6 months; can have follow CTA head and neck at 3 months to guide duration of therapy  -- Discussed with patient risks and benefits of continued antithrombotic therapy with aspirin 81 mg after treatment with OAC; ultimate decision to be made on long term basis but provided a rational regarding long term treatment  -- Renal artery dopplers to assess for associated FMD vasculopathy  -- No DVT chemoppx in setting of systemic heparin  -- Stroke bridge clinic follow up on discharge    Danny Nunez III, MD  Vascular Neurology, [facility]

## 2023-12-12 NOTE — H&P
Hospital Medicine History & Physical Note    Date of Service  12/12/2023    Primary Care Physician  Mary Spicer M.D.    Consultants  Neuro-ir  neurology    Specialist Names: Dr Glen Baeza      Code Status  Full Code    Chief Complaint  Chief Complaint   Patient presents with    Headache    Light Sensitivity    Eye Pain     left       History of Presenting Illness  Elzbieta Rao is a 33 y.o. female who presented 12/12/2023 with with no significant neurological history who presented to the hospital above chief complaint.  Patient states over the last couple days she has had worsening headache with some associated light sensitivity but is never been officially diagnosed with migraines.  She is taken occasional Tylenol and ibuprofen with good relief but then worked overnight last night with continued headache and then this morning started having transient numbness of the upper extremity from the hand to the elbow of the right side.  She became concerned and came to the emergency room.    Initially in the ER she was given IV Decadron and IV fentanyl and ibuprofen with some improvement in symptoms.  She underwent a CTA of the neck and head which showed what appears to be an acute dissection of the left internal carotid artery near with distal cervical ICA petrous cavernous segments that are asymmetrically smaller compared to the right, just proximal skull this is a focal partial linear hypodensity with filling defect with associated luminal narrowing suspicious for dissection.  CTA of the neck showed a flap-like filling defect in the left internal carotid artery petrous segment suspicious for dissection.    Patient currently feels ok and has not had any recurrence of her neurological symptoms. The episode lasted about 15 minutes with transient numbness but no weakness of the R hand moving proximally to the R elbow. She has a migratory HA now with no neurological symptoms. Denies any recent manipulation  of the neck. Does do yoga, but no recent changes in extreme poses or anything. No family history. No personal history of excessive hypermobility or skin laxity. No OCps. Has a mirena in place. 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 Denies any new OTC or Rx medications.     I personally spoke with Dr. Parkinson of the ER group.  Neuro interventional radiology as well as neurology were consulted and stat MRI has been ordered to rule out AN acute stroke.  If any acute stroke is ruled out then a weight-based heparin drip will be initiated.    I discussed the plan of care with patient and family.    Review of Systems  Review of Systems   Constitutional:  Negative for chills and fever.   Gastrointestinal:  Negative for abdominal pain, nausea and vomiting.   Neurological:  Positive for headaches. Negative for sensory change, speech change, focal weakness and weakness.   Psychiatric/Behavioral:  The patient is nervous/anxious (mild).        Past Medical History   has no past medical history on file.    Surgical History   has a past surgical history that includes other; other (02/2020); abdominal exploration (2010); and repair, knee, acl (Left, 2020).     Family History  family history includes Cancer in her paternal grandmother; Hypertension in her mother; No Known Problems in her brother, father, and sister; Stroke in her maternal grandfather.   Family history reviewed with patient. There is no family history that is pertinent to the chief complaint.     Social History   reports that she has never smoked. She has never used smokeless tobacco. She reports current alcohol use of about 1.2 oz of alcohol per week. She reports that she does not use drugs.    Allergies  No Known Allergies    Medications  Prior to Admission Medications   Prescriptions Last Dose Informant Patient Reported? Taking?   Lidocaine-Hydrocortisone Ace 2-2 % Kit   No No   Sig: Insert 1 Application into the rectum 2 times a day as  needed.   acyclovir (ZOVIRAX) 400 MG tablet   No No   Sig: Take 1 Tablet by mouth 3 times a day.   fluconazole (DIFLUCAN) 100 MG Tab   No No   Sig: Take 1.5 Tablets by mouth every day. May repeat dosing in 72 hours if still symptomatic   fluconazole (DIFLUCAN) 100 MG Tab   No No   Sig: Take 1.5 Tablets by mouth every 72 hours as needed (take second dose if still symptomatic after 72 hours).   ibuprofen (MOTRIN) 200 MG Tab   Yes No   Sig: Take 400 mg by mouth every 6 hours as needed.   imiquimod (ALDARA) 5 % cream   No No   Sig: Apply 1 g topically every day.   pramoxine (PROCTOFOAM) 1 % foam   No No   Sig: Insert 1 Application into the rectum 4 times a day as needed.      Facility-Administered Medications: None       Physical Exam  Temp:  [36.1 °C (97 °F)] 36.1 °C (97 °F)  Pulse:  [60-91] 91  Resp:  [17-18] 17  BP: (131-151)/(80-94) 135/82  SpO2:  [96 %-98 %] 97 %  Blood Pressure: (!) 151/90   Temperature: 36.1 °C (97 °F)   Pulse: 60   Respiration: 18   Pulse Oximetry: 97 %       Physical Exam  Vitals and nursing note reviewed.   Constitutional:       General: She is not in acute distress.     Appearance: She is well-developed.   HENT:      Head: Normocephalic and atraumatic.      Mouth/Throat:      Pharynx: No oropharyngeal exudate.   Eyes:      General: No scleral icterus.     Pupils: Pupils are equal, round, and reactive to light.   Neck:      Thyroid: No thyromegaly.   Cardiovascular:      Rate and Rhythm: Normal rate and regular rhythm.      Heart sounds: Normal heart sounds. No murmur heard.  Pulmonary:      Effort: Pulmonary effort is normal. No respiratory distress.      Breath sounds: Normal breath sounds. No wheezing.   Abdominal:      General: Bowel sounds are normal. There is no distension.      Palpations: Abdomen is soft.      Tenderness: There is no abdominal tenderness.   Musculoskeletal:         General: No tenderness. Normal range of motion.      Cervical back: Normal range of motion and neck  "supple.      Comments: 5/5 MSK strength B/L UE's   Skin:     General: Skin is warm and dry.      Findings: No rash.   Neurological:      Mental Status: She is alert and oriented to person, place, and time.      Cranial Nerves: No cranial nerve deficit.      Sensory: No sensory deficit.      Motor: No weakness.      Coordination: Coordination normal.         Laboratory:  Recent Labs     12/12/23  0911   WBC 6.6   RBC 4.94   HEMOGLOBIN 15.9   HEMATOCRIT 46.5   MCV 94.1   MCH 32.2   MCHC 34.2   RDW 42.3   PLATELETCT 320   MPV 9.8     Recent Labs     12/12/23  0911   SODIUM 141   POTASSIUM 3.6   CHLORIDE 103   CO2 25   GLUCOSE 135*   BUN 9   CREATININE 0.76   CALCIUM 9.8     Recent Labs     12/12/23  0911   ALTSGPT 10   ASTSGOT 10*   ALKPHOSPHAT 61   TBILIRUBIN 0.4   GLUCOSE 135*     Recent Labs     12/12/23  1455   APTT 30.8   INR 1.04     No results for input(s): \"NTPROBNP\" in the last 72 hours.      No results for input(s): \"TROPONINT\" in the last 72 hours.    Imaging:  MR-BRAIN-W/O   Final Result         No acute process. No evidence of an acute infarction.      Wall hematoma noted at the site of the dissection in the left petrous ICA.      CT-CTA NECK WITH & W/O-POST PROCESSING   Final Result      Flap-like filling defect in the left internal carotid artery petrous segment suspicious for dissection. Findings better seen and evaluated on the dedicated CTA of the head, report to follow.      Findings were conveyed to Dr. Parkinson by Dr. Rodriguez on 12/12/2023 11:23 AM.      CT-CTA HEAD WITH & W/O-POST PROCESS   Final Result   Addendum (preliminary) 1 of 1   Addendum:      The distal cervical ICA and haleigh cavernous segments are asymmetrically    small compared to the right. Just proximal to the skull base there is a    focal partially linear hypodensity in filling defect with associated    luminal narrowing suspicious for    dissection. In the proximal petrous portion in the proximal carotid canal    there likely is a " severe high-grade focal stenosis also present.      These findings were discussed with BEBE WEAVER on 12/12/2023 11:24 AM.      Final      CT angiogram of the [place] [facility] [place] within normal limits.      No acute intracranial abnormality.          I personally reviewed the CBC and CMP on 12/12/2023.    Assessment/Plan:  Justification for Admission Status  I anticipate this patient will require at least two midnights for appropriate medical management, necessitating inpatient admission because acute L ICA dissection with associated neurological findings    Patient will need a Telemetry bed on NEUROLOGY service .  The need is secondary to above.    * Carotid artery dissection (HCC)- (present on admission)  Assessment & Plan  Appears spontaneous in nature, L ICA  Neuro-IR and neurology both consulting  No clear need for stenting at this time  MRI brain stat to r/o CVA  IF above negative, likely weight-based heparin gtt  Q2 hour neurological checks  Monitor BP  Possibly underlying genetic predisposition  No clear e/o active infection, prior STI, but no clear diagnosis of syphilis          VTE prophylaxis: SCDs/TEDs

## 2023-12-12 NOTE — ED TRIAGE NOTES
Pt amb to triage w/ family.  Chief Complaint   Patient presents with    Headache    Light Sensitivity    Eye Pain     left     Symptoms x4d. Pt also concerned about intermittent numbness on right hand.

## 2023-12-12 NOTE — ED NOTES
Medication history reviewed with PT at bedside    [facility] rec is complete per PT reporting    Allergies reviewed.     Patient denies any outpatient antibiotics in the last 30 days.     Patient is not taking anticoagulants.    Preferred pharmacy for this visit - [facility] ([phone])

## 2023-12-12 NOTE — PROGRESS NOTES
Called for IMCU admission for L ICA dissection. Patient MRI with no significant stroke burden. Patient is good for IMCU admission and heparin gtt. Will need vaculopathy workup as outpatient vs yoga/migraine precip.     RTOC notified.     Abraham Montoya MD  Critical Care Medicine

## 2023-12-12 NOTE — ASSESSMENT & PLAN NOTE
Appears spontaneous in nature, L ICA no trauma.  Neuro-IR and neurology both consulting  No clear need for stenting at this time  MRI brain negative for stroke  IV weight-based heparin gtt  Q2 hour neurological checks change to q4 hour  Monitor BP which has been excellent  Possibly underlying genetic predisposition likely a candidate for outpatient f/u in the [facility]  Tylenol and ibuprofen for headache

## 2023-12-12 NOTE — ED PROVIDER NOTES
ED Provider Note    CHIEF COMPLAINT  Chief Complaint   Patient presents with    Headache    Light Sensitivity    Eye Pain     left       EXTERNAL RECORDS REVIEWED  Patient was seen in the emergency department in January of this year for body fluid exposure.    Outpatient encounter in April of this year for hepatitis panel and HIV screening.    HPI/ROS  LIMITATION TO HISTORY   Select: : None  OUTSIDE HISTORIAN(S):  Family Aunt    Elzbieta Rao is a 33 y.o. female who presents to the emergency department with a chief complaint of headache, light sensitivity.  Patient does have a history of headaches but they have always been resolved with ibuprofen they have been mild.  She is never had a migraine or been diagnosed with migraines.  They do not run in her family.  This particular headache started on Thursday evening.  It has been present persistently.  It sometimes improves with ibuprofen but it has not gone away.  She has not had a fever.  She describes it as a frontal headache, she is also experiencing some left periorbital eye pain and light sensitivity.  These are new symptoms for her.  She denies double vision but reports that her vision is not clear.  She wears contact lenses.  No recent trauma or falls.  She is experienced some nausea but no vomiting.  She denies the possibility of pregnancy.  She has had an IUD of for about 2 years.  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  She worked last night, went home, slept, hoping symptoms would sharyn but they have persisted.  Additionally, she is experienced tingling in her right fingertips that has progressed up to her right elbow.  This symptom lasted for a brief period and has since resolved.  She also experienced paresthesias in her right tongue again, transient in nature, they have since resolved.  She denies eye pain or pain with eye movement or sense of foreign body sensation in her eye.  She does not have eye  "irritation or drainage.  She is overall healthy with no past medical history.  No new medications.  No new diet.  No new exercise routine.    PAST MEDICAL HISTORY   None reported    SURGICAL HISTORY   has a past surgical history that includes other; other (02/2020); abdominal exploration (2010); and repair, knee, acl (Left, 2020).    FAMILY HISTORY  Family History   Problem Relation Age of Onset    Hypertension Mother         heart murmur    No Known Problems Father     No Known Problems Sister     No Known Problems Brother     Stroke Maternal Grandfather     Cancer Paternal Grandmother        SOCIAL HISTORY  Social History     Tobacco Use    Smoking status: Never    Smokeless tobacco: Never   Vaping Use    Vaping Use: Never used   Substance and Sexual Activity    Alcohol use: Yes     Alcohol/week: 1.2 oz     Types: 2 Standard drinks or equivalent per week     Comment: social     Drug use: No    Sexual activity: Yes     Partners: Male     Birth control/protection: Condom       CURRENT MEDICATIONS  Home Medications       Reviewed by Amos Lancaster (Pharmacy Tech) on 12/12/23 at 1437  Med List Status: Complete     Medication Last Dose Status   acetaminophen (TYLENOL) 500 MG Tab 12/12/2023 Active   ibuprofen (MOTRIN) 200 MG Tab 12/12/2023 Active                    ALLERGIES  No Known Allergies    PHYSICAL EXAM  VITAL SIGNS: /82   Pulse 91   Temp 36.1 °C (97 °F) (Temporal)   Resp 17   Ht 1.727 m (5' 8\")   Wt 65 kg (143 lb 4.8 oz)   SpO2 97%   BMI 21.79 kg/m²    Vitals reviewed.  Constitutional: Patient is oriented to person, place, and time. Appears well-developed and well-nourished. No distress.    Head: Normocephalic and atraumatic.   Ears: Normal external ears bilaterally.   Mouth/Throat: Oropharynx is clear and moist, no exudates.   Eyes: Conjunctivae are normal. Pupils are equal, round, and reactive to light.   Neck: Normal range of motion. Neck supple. No tracheal deviation present.  No " meningeal signs.  Cardiovascular: Normal rate, regular rhythm and normal heart sounds. Normal peripheral pulses.  Pulmonary/Chest: Effort normal and breath sounds normal. No respiratory distress, no wheezes, rhonchi, or rales.  Abdominal: Soft. Bowel sounds are normal. There is no tenderness, rebound or guarding, or peritoneal signs.  Musculoskeletal: No edema and no tenderness.   Lymphadenopathy: No cervical adenopathy.   Neurological: Patient is alert and oriented to person, place, and time. No cranial nerve deficits. Normal motor and sensory exam. No focal deficits.  Normal gait.  Normal speech.  Normal cognition.  Skin: Skin is warm and dry. No erythema. No pallor.   Psychiatric: Patient has a normal mood and affect.     DIAGNOSTIC STUDIES / PROCEDURES    LABS  Results for orders placed or performed during the hospital encounter of 12/12/23   CBC WITH DIFFERENTIAL   Result Value Ref Range    WBC 6.6 4.8 - 10.8 K/uL    RBC 4.94 4.20 - 5.40 M/uL    Hemoglobin 15.9 12.0 - 16.0 g/dL    Hematocrit 46.5 37.0 - 47.0 %    MCV 94.1 81.4 - 97.8 fL    MCH 32.2 27.0 - 33.0 pg    MCHC 34.2 32.2 - 35.5 g/dL    RDW 42.3 35.9 - 50.0 fL    Platelet Count 320 164 - 446 K/uL    MPV 9.8 9.0 - 12.9 fL    Neutrophils-Polys 73.60 (H) 44.00 - 72.00 %    Lymphocytes 20.70 (L) 22.00 - 41.00 %    Monocytes 5.00 0.00 - 13.40 %    Eosinophils 0.20 0.00 - 6.90 %    Basophils 0.30 0.00 - 1.80 %    Immature Granulocytes 0.20 0.00 - 0.90 %    Nucleated RBC 0.00 0.00 - 0.20 /100 WBC    Neutrophils (Absolute) 4.88 1.82 - 7.42 K/uL    Lymphs (Absolute) 1.37 1.00 - 4.80 K/uL    Monos (Absolute) 0.33 0.00 - 0.85 K/uL    Eos (Absolute) 0.01 0.00 - 0.51 K/uL    Baso (Absolute) 0.02 0.00 - 0.12 K/uL    Immature Granulocytes (abs) 0.01 0.00 - 0.11 K/uL    NRBC (Absolute) 0.00 K/uL   COMP METABOLIC PANEL   Result Value Ref Range    Sodium 141 135 - 145 mmol/L    Potassium 3.6 3.6 - 5.5 mmol/L    Chloride 103 96 - 112 mmol/L    Co2 25 20 - 33 mmol/L     Anion Gap 13.0 7.0 - 16.0    Glucose 135 (H) 65 - 99 mg/dL    Bun 9 8 - 22 mg/dL    Creatinine 0.76 0.50 - 1.40 mg/dL    Calcium 9.8 8.5 - 10.5 mg/dL    Correct Calcium 8.9 8.5 - 10.5 mg/dL    AST(SGOT) 10 (L) 12 - 45 U/L    ALT(SGPT) 10 2 - 50 U/L    Alkaline Phosphatase 61 30 - 99 U/L    Total Bilirubin 0.4 0.1 - 1.5 mg/dL    Albumin 5.1 (H) 3.2 - 4.9 g/dL    Total Protein 8.0 6.0 - 8.2 g/dL    Globulin 2.9 1.9 - 3.5 g/dL    A-G Ratio 1.8 g/dL   CoV-2, Flu A/B, And RSV by PCR ([id number])    Specimen: Nasal; Respirate   Result Value Ref Range    Influenza virus A RNA Negative Negative    Influenza virus B, PCR Negative Negative    RSV, PCR Negative Negative    SARS-CoV-2 by PCR NotDetected     SARS-CoV-2 Source NP Swab    BETA-HCG QUALITATIVE SERUM   Result Value Ref Range    Beta-Hcg Qualitative Serum Negative Negative   ESTIMATED GFR   Result Value Ref Range    GFR (CKD-EPI) 105 >60 mL/min/1.73 m 2   aPTT   Result Value Ref Range    APTT 30.8 24.7 - 36.0 sec   Prothrombin Time   Result Value Ref Range    PT 13.7 12.0 - 14.6 sec    INR 1.04 0.87 - 1.13     RADIOLOGY  I have independently interpreted the diagnostic imaging associated with this visit and am waiting the final reading from the radiologist.   My preliminary interpretation is as follows: no ICH  Radiologist interpretation:   MR-BRAIN-W/O   Final Result         No acute process. No evidence of an acute infarction.      Wall hematoma noted at the site of the dissection in the left petrous ICA.      CT-CTA NECK WITH & W/O-POST PROCESSING   Final Result      Flap-like filling defect in the left internal carotid artery petrous segment suspicious for dissection. Findings better seen and evaluated on the dedicated CTA of the head, report to follow.      Findings were conveyed to Dr. Parkinson by Dr. Rodriguez on 12/12/2023 11:23 AM.      CT-CTA HEAD WITH & W/O-POST PROCESS   Final Result   Addendum (preliminary) 1 of 1   Addendum:      The distal cervical ICA and haleigh  cavernous segments are asymmetrically    small compared to the right. Just proximal to the skull base there is a    focal partially linear hypodensity in filling defect with associated    luminal narrowing suspicious for    dissection. In the proximal petrous portion in the proximal carotid canal    there likely is a severe high-grade focal stenosis also present.      These findings were discussed with BEBE WEAVER on 12/12/2023 11:24 AM.      Final      CT angiogram of the [place] within normal limits.      No acute intracranial abnormality.          COURSE & MEDICAL DECISION MAKING    ED Observation Status? Yes; I am placing the patient in to an observation status due to a diagnostic uncertainty as well as therapeutic intensity. Patient placed in observation status at 9:01 AM, 12/12/2023.     Observation plan is as follows: Due to diagnostic uncertainty, possible need for further advanced imaging, possibly MRI versus hospitalization for further advanced imaging, patient's placed in ED observation.    Upon Reevaluation, the patient's condition has: not improved; and will be escalated to hospitalization.    Patient discharged from ED Observation status at 1310 (Time) 12/12/2023 (Date).     INITIAL ASSESSMENT, COURSE AND PLAN  Care Narrative:     This is a pleasant, well-appearing 33-year-old female.  She is overall quite healthy.  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  She has been experiencing a headache.  Characteristics of the headache are suggestive of migraine though she does not have a history of migraine.  Additionally, she has had some transient neurologic changes including numbness tingling in her right hand that has extended up to her elbow and on her right tongue.  The symptoms were brief and have since resolved.  No speech changes.  No cranial nerve deficits.  No rash.  No recent illness.  Other etiologies include possibly viral illness and of course either TIA or stroke would be in the  differential.    11:22AM D/W /Dr. Ojeda, radiology regarding patient's CT findings.  There is a suspicious for focal flap like filling defect concerning for possible dissection of the left internal carotid artery with associated stenosis.  This may be something amenable to IR and he suggested I call the interventional radiologist.  I did this, Dr. Stallings is on.  He is currently in a procedure and I have sent him a message via [employer] I have also contacted the department and notified staff that I have contacted if they can direct them to this message and have him recontact me.    11:45 AM patient is updated on CT findings which she was able to view on [facility] as well.  She understands, I have spoken with radiology and IR and await response from neuro IR regarding plan of care.  I anticipate, she will be on anticoagulation and will need hospital admission.    12:23 PM discussed with Dr. Nunez, on call for neurovascular who recommends a stat MRI prior to starting heparin to evaluate for possible stroke.  I have also spoken with pharmacy regarding heparin administration.  Will hold off at this time until MRI is complete and I will contact ICU/ hospitalist for admission.    12:38 PM discussed with , neuro interventionalists regarding patient's CT.  He was able to review the films.  No intervention at this time but he is recommending neuroconsult and a heparin drip.    12:48 PM D/W MRI tech, they have a pelvis to do now and in the next hour, we could bring her over.  We need the MRI worksheet and the nurses updated on this and she will complete the form.  Neurology at bedside currently.  Patient is updated on plan of care.    1:02 PM D/W Dr. Wilks, hospitalist regarding patient's presentation and my discussion with IR, neurology and plan at this time for MRI and plus minus heparin based on those findings.  He agrees to admit the patient to their service.    The total critical care time on this  patient is 120 minutes, resuscitating patient, speaking with admitting physician, and deciphering test results. This 120minutes is exclusive of separately billable procedures.    HYDRATION: Based on the patient's presentation of Other headache the patient was given IV fluids. IV Hydration was used because oral hydration was not adequate alone. Upon recheck following hydration, the patient was improved.        DISPOSITION AND DISCUSSIONS  I have discussed management of the patient with the following physicians and JOSEF's: Radiology, hospitalist, intensivist, interventional radiologist, neurology, pharmacy    Discussion of management with other QHP or appropriate source(s): Pharmacy re: heparin gtt      Escalation of care considered, and ultimately not performed: None    Barriers to care at this time, including but not limited to:  None .     FINAL DIAGNOSIS  1. Internal carotid artery dissection (HCC)    Critical care time: 120 minutes       Electronically signed by: Kimberly Parkinson D.O., 12/12/2023 8:40 AM

## 2023-12-13 ENCOUNTER — APPOINTMENT (OUTPATIENT)
Dept: RADIOLOGY | Facility: MEDICAL CENTER | Age: 33
DRG: 301 | End: 2023-12-13
Attending: INTERNAL MEDICINE
Payer: COMMERCIAL

## 2023-12-13 LAB
UFH PPP CHRO-ACNC: 0.47 IU/ML
UFH PPP CHRO-ACNC: 0.55 IU/ML

## 2023-12-13 PROCEDURE — 85520 HEPARIN ASSAY: CPT

## 2023-12-13 PROCEDURE — 770000 HCHG ROOM/CARE - INTERMEDIATE ICU *

## 2023-12-13 PROCEDURE — A9270 NON-COVERED ITEM OR SERVICE: HCPCS | Performed by: HOSPITALIST

## 2023-12-13 PROCEDURE — 700102 HCHG RX REV CODE 250 W/ 637 OVERRIDE(OP): Performed by: HOSPITALIST

## 2023-12-13 PROCEDURE — 700111 HCHG RX REV CODE 636 W/ 250 OVERRIDE (IP): Performed by: INTERNAL MEDICINE

## 2023-12-13 PROCEDURE — 700102 HCHG RX REV CODE 250 W/ 637 OVERRIDE(OP): Performed by: INTERNAL MEDICINE

## 2023-12-13 PROCEDURE — 99233 SBSQ HOSP IP/OBS HIGH 50: CPT | Performed by: HOSPITALIST

## 2023-12-13 PROCEDURE — A9270 NON-COVERED ITEM OR SERVICE: HCPCS | Performed by: INTERNAL MEDICINE

## 2023-12-13 PROCEDURE — 93975 VASCULAR STUDY: CPT

## 2023-12-13 PROCEDURE — 99232 SBSQ HOSP IP/OBS MODERATE 35: CPT | Performed by: PSYCHIATRY & NEUROLOGY

## 2023-12-13 RX ORDER — IBUPROFEN 400 MG/1
400 TABLET ORAL EVERY 6 HOURS PRN
Status: DISCONTINUED | OUTPATIENT
Start: 2023-12-13 | End: 2023-12-14 | Stop reason: HOSPADM

## 2023-12-13 RX ADMIN — ACETAMINOPHEN 1000 MG: 500 TABLET, FILM COATED ORAL at 13:02

## 2023-12-13 RX ADMIN — DOCUSATE SODIUM 50 MG AND SENNOSIDES 8.6 MG 2 TABLET: 8.6; 5 TABLET, FILM COATED ORAL at 05:44

## 2023-12-13 RX ADMIN — ACETAMINOPHEN 1000 MG: 500 TABLET, FILM COATED ORAL at 05:44

## 2023-12-13 RX ADMIN — HEPARIN SODIUM 16 UNITS/KG/HR: 5000 INJECTION, SOLUTION INTRAVENOUS at 17:12

## 2023-12-13 RX ADMIN — DOCUSATE SODIUM 50 MG AND SENNOSIDES 8.6 MG 2 TABLET: 8.6; 5 TABLET, FILM COATED ORAL at 17:13

## 2023-12-13 RX ADMIN — ONDANSETRON 4 MG: 4 TABLET, ORALLY DISINTEGRATING ORAL at 13:02

## 2023-12-13 RX ADMIN — ACETAMINOPHEN 1000 MG: 500 TABLET, FILM COATED ORAL at 00:05

## 2023-12-13 RX ADMIN — IBUPROFEN 400 MG: 400 TABLET, FILM COATED ORAL at 20:43

## 2023-12-13 RX ADMIN — OXYCODONE HYDROCHLORIDE 5 MG: 5 TABLET ORAL at 11:10

## 2023-12-13 ASSESSMENT — PAIN DESCRIPTION - PAIN TYPE
TYPE: ACUTE PAIN

## 2023-12-13 ASSESSMENT — ENCOUNTER SYMPTOMS
WEAKNESS: 0
SPEECH CHANGE: 0
HEADACHES: 1

## 2023-12-13 NOTE — PROGRESS NOTES
Hospital Medicine Daily Progress Note    Date of Service  12/13/2023    Chief Complaint  Elzbieta Rao is a 33 y.o. female admitted 12/12/2023 with headache    Hospital Course  Ms. Rao is a 33 y.o. female who presented 12/12/2023 with with no significant neurological history who presented to the hospital above chief complaint.  Patient states over the last couple days she has had worsening headache with some associated light sensitivity but is never been officially diagnosed with migraines.  She is taken occasional Tylenol and ibuprofen with good relief but then worked overnight last night with continued headache and then this morning started having transient numbness of the upper extremity from the hand to the elbow of the right side.  She became concerned and came to the emergency room.     Initially in the ER she was given IV Decadron and IV fentanyl and ibuprofen with some improvement in symptoms.  She underwent a CTA of the neck and head which showed what appears to be an acute dissection of the left internal carotid artery near with distal cervical ICA petrous cavernous segments that are asymmetrically smaller compared to the right, just proximal skull this is a focal partial linear hypodensity with filling defect with associated luminal narrowing suspicious for dissection.  CTA of the neck showed a flap-like filling defect in the left internal carotid artery petrous segment suspicious for dissection.     Patient currently feels ok and has not had any recurrence of her neurological symptoms. The episode lasted about 15 minutes with transient numbness but no weakness of the R hand moving proximally to the R elbow. She has a migratory HA now with no neurological symptoms. Denies any recent manipulation of the neck. Does do yoga, but no recent changes in extreme poses or anything. No family history. No personal history of excessive hypermobility or skin laxity. No OCps. Has a mirena in place. 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 Denies any new OTC or Rx medications.     Interval Problem Update  12/13: Ms. Rao was evaluated and examined in the IMCU. She remains on an IV heparin drip with serial anti-Xa levels. Her blood pressure this morning is 110/73 without medications. MRI was negative for stroke. She notes a headache but no neuro changes.     I have discussed this patient's plan of care and discharge plan at IDT rounds today with Case Management, Nursing, Nursing leadership, and other members of the IDT team.    Consultants/Specialty  Neurology     Code Status  Full Code    Disposition  The patient is not medically cleared for discharge to home or a post-acute facility.  Anticipate discharge to: home with close outpatient follow-up    I have placed the appropriate orders for post-discharge needs.    Review of Systems  Review of Systems   Neurological:  Positive for headaches. Negative for speech change and weakness.        Vision improved   All other systems reviewed and are negative.       Physical Exam  Temp:  [36.1 °C (97 °F)-36.3 °C (97.3 °F)] 36.3 °C (97.3 °F)  Pulse:  [51-94] 58  Resp:  [16-18] 16  BP: ()/(61-94) 110/73  SpO2:  [75 %-100 %] 98 %    Physical Exam  Vitals and nursing note reviewed.   Constitutional:       General: She is not in acute distress.     Appearance: She is not toxic-appearing.   Cardiovascular:      Rate and Rhythm: Normal rate and regular rhythm.   Pulmonary:      Effort: Pulmonary effort is normal.      Breath sounds: Normal breath sounds.   Skin:     General: Skin is warm and dry.   Neurological:      General: No focal deficit present.      Mental Status: She is alert and oriented to person, place, and time.   Psychiatric:         Mood and Affect: Mood normal.         Behavior: Behavior normal.         Fluids    Intake/Output Summary (Last 24 hours) at 12/13/2023 0737  Last data filed at 12/13/2023 0400  Gross per 24 hour   Intake 228.1  ml   Output --   Net 228.1 ml       Laboratory  Recent Labs     12/12/23  0911   WBC 6.6   RBC 4.94   HEMOGLOBIN 15.9   HEMATOCRIT 46.5   MCV 94.1   MCH 32.2   MCHC 34.2   RDW 42.3   PLATELETCT 320   MPV 9.8     Recent Labs     12/12/23  0911   SODIUM 141   POTASSIUM 3.6   CHLORIDE 103   CO2 25   GLUCOSE 135*   BUN 9   CREATININE 0.76   CALCIUM 9.8     Recent Labs     12/12/23  1455   APTT 30.8   INR 1.04               Imaging  US-RENAL ARTERY DUPLEX COMP   Final Result      MR-BRAIN-W/O   Final Result         No acute process. No evidence of an acute infarction.      Wall hematoma noted at the site of the dissection in the left petrous ICA.      CT-CTA NECK WITH & W/O-POST PROCESSING   Final Result      Flap-like filling defect in the left internal carotid artery petrous segment suspicious for dissection. Findings better seen and evaluated on the dedicated CTA of the head, report to follow.      Findings were conveyed to Dr. Parkinson by Dr. Rodriguez on 12/12/2023 11:23 AM.      CT-CTA HEAD WITH & W/O-POST PROCESS   Final Result   Addendum (preliminary) 1 of 1   Addendum:      The distal cervical ICA and haleigh cavernous segments are asymmetrically    small compared to the right. Just proximal to the skull base there is a    focal partially linear hypodensity in filling defect with associated    luminal narrowing suspicious for    dissection. In the proximal petrous portion in the proximal carotid canal    there likely is a severe high-grade focal stenosis also present.      These findings were discussed with BEBE PARKINSON on 12/12/2023 11:24 AM.      Final      CT angiogram of the [place] within normal limits.      No acute intracranial abnormality.           Assessment/Plan  * Carotid artery dissection (HCC)- (present on admission)  Assessment & Plan  Appears spontaneous in nature, L ICA no trauma.  Neuro-IR and neurology both consulting  No clear need for stenting at this time  MRI brain negative for  stroke  IV weight-based heparin gtt  Q2 hour neurological checks change to q4 hour  Monitor BP which has been excellent  Possibly underlying genetic predisposition likely a candidate for outpatient f/u in the [facility]  Tylenol and ibuprofen for headache           VTE prophylaxis:    therapeutic anticoagulation with weight-based heparin gtt, pharmacy to adjust PRN      I have performed a physical exam and reviewed and updated ROS and Plan today (12/13/2023). In review of yesterday's note (12/12/2023), there are no changes except as documented above.

## 2023-12-13 NOTE — PROGRESS NOTES
Neurology Progress Note  Neurohospitalist Service, [facility] Neurosciences    Referring Physician: Lionel Angela M.D.    Chief Complaint   Patient presents with    Headache    Light Sensitivity    Eye Pain     left       HPI: Refer to initial documented Neurology H&P, as detailed in the patient's chart.    Interval History: No acute events overnight.  No new complaints, no significant changes.  Headache has improved although has not resolved.  No numbness, weakness, difficulty talking.    Review of systems: In addition to what is detailed in the HPI and/or updated in the interval history, all other systems reviewed and are negative.    Past Medical History:    has no past medical history on file.    FHx:  family history includes Cancer in her paternal grandmother; Hypertension in her mother; No Known Problems in her brother, father, and sister; Stroke in her maternal grandfather.    SHx:   reports that she has never smoked. She has never used smokeless tobacco. She reports current alcohol use of about 1.2 oz of alcohol per week. She reports that she does not use drugs.    Medications:    Current Facility-Administered Medications:     senna-docusate (Pericolace Or Senokot S) 8.6-50 MG per tablet 2 Tablet, 2 Tablet, Oral, BID, 2 Tablet at 12/13/23 0544 **AND** polyethylene glycol/lytes (Miralax) Packet 1 Packet, 1 Packet, Oral, QDAY PRN **AND** magnesium hydroxide (Milk Of Magnesia) suspension 30 mL, 30 mL, Oral, QDAY PRN **AND** bisacodyl (Dulcolax) suppository 10 mg, 10 mg, Rectal, QDAY PRN, José Miguel Ramos M.D.    ondansetron (Zofran) syringe/vial injection 4 mg, 4 mg, Intravenous, Q4HRS PRN, José Miguel Ramos M.D.    ondansetron (Zofran ODT) dispertab 4 mg, 4 mg, Oral, Q4HRS PRN, José Miguel Ramos M.D., 4 mg at 12/13/23 1302    promethazine (Phenergan) tablet 12.5-25 mg, 12.5-25 mg, Oral, Q4HRS PRN, José Miguel T Lindstedt, M.D.    promethazine (Phenergan) suppository 12.5-25 mg, 12.5-25 mg, Rectal, Q4HRS  PRN, José Miguel Ramos M.D.    prochlorperazine (Compazine) injection 5-10 mg, 5-10 mg, Intravenous, Q4HRS PRN, José Miguel Ramos M.D.    heparin infusion 25,000 units in 500 mL 0.45% NACL, 0-30 Units/kg/hr, Intravenous, Continuous, José Miguel Ramos M.D., Last Rate: 31.2 mL/hr at 12/13/23 1215, 24 Units/kg/hr at 12/13/23 1215    Pharmacy Consult Request ...Pain Management Review 1 Each, 1 Each, Other, PHARMACY TO DOSE, José Miguel Ramos M.D.    acetaminophen (Tylenol) tablet 1,000 mg, 1,000 mg, Oral, Q6HRS, 1,000 mg at 12/13/23 1302 **FOLLOWED BY** [START ON 12/17/2023] acetaminophen (Tylenol) tablet 1,000 mg, 1,000 mg, Oral, Q6HRS PRN, José Miguel Ramos M.D.    oxyCODONE immediate-release (Roxicodone) tablet 2.5 mg, 2.5 mg, Oral, Q3HRS PRN, 2.5 mg at 12/12/23 1638 **OR** oxyCODONE immediate-release (Roxicodone) tablet 5 mg, 5 mg, Oral, Q3HRS PRN, 5 mg at 12/13/23 1110 **OR** HYDROmorphone (Dilaudid) injection 0.25 mg, 0.25 mg, Intravenous, Q3HRS PRN, José Miguel Ramos M.D.    hydrALAZINE (Apresoline) injection 10 mg, 10 mg, Intravenous, Q6HRS PRN, Robert Bangura M.D.    labetalol (Normodyne/Trandate) injection 10 mg, 10 mg, Intravenous, Q4HRS PRN, Robert Bangura M.D.    Physical Examination:    Vitals:    12/13/23 1000 12/13/23 1100 12/13/23 1215 12/13/23 1400   BP: 129/89 115/77 117/77 115/74   Pulse: 69 62 79 (!) 59   Resp: 16 15 16 17   Temp:   36.3 °C (97.4 °F)    TempSrc:   Temporal Temporal   SpO2: 100% 97% 95% 96%   Weight:       Height:           General:   Patient is awake and in no acute distress  Neck: Full range of motion  Eyes: Midline, Pupils reactive to light.  CV: RRR  Lungs: No respiratory distress  Extremities: No cyanosis, warm, no significant edema.    NEUROLOGICAL EXAM:   Mental status: Awake, alert and fully oriented, follows commands  Speech and language: speech is not dysarthric. The patient is able to name and repeat.  Cranial nerve exam: Pupils are equal, round and reactive to light  bilaterally. Visual fields are full. Extraocular muscles are intact.   Face is symmetric, facial sensation is intact.   Motor exam: Sustain antigravity in all 4 extremities with no downward drift. Tone is normal. No abnormal movements were seen on exam.  Sensory exam: No sensory deficits identified   Coordination: no gross ataxia noted on exam  Plantar reflexes: Equivocal  Gait: deferred     Objective Data:    Labs:  Lab Results   Component Value Date/Time    PROTHROMBTM 13.7 12/12/2023 02:55 PM    INR 1.04 12/12/2023 02:55 PM      Lab Results   Component Value Date/Time    WBC 6.6 12/12/2023 09:11 AM    RBC 4.94 12/12/2023 09:11 AM    HEMOGLOBIN 15.9 12/12/2023 09:11 AM    HEMATOCRIT 46.5 12/12/2023 09:11 AM    MCV 94.1 12/12/2023 09:11 AM    MCH 32.2 12/12/2023 09:11 AM    MCHC 34.2 12/12/2023 09:11 AM    MPV 9.8 12/12/2023 09:11 AM    NEUTSPOLYS 73.60 (H) 12/12/2023 09:11 AM    LYMPHOCYTES 20.70 (L) 12/12/2023 09:11 AM    MONOCYTES 5.00 12/12/2023 09:11 AM    EOSINOPHILS 0.20 12/12/2023 09:11 AM    BASOPHILS 0.30 12/12/2023 09:11 AM      Lab Results   Component Value Date/Time    SODIUM 141 12/12/2023 09:11 AM    POTASSIUM 3.6 12/12/2023 09:11 AM    CHLORIDE 103 12/12/2023 09:11 AM    CO2 25 12/12/2023 09:11 AM    GLUCOSE 135 (H) 12/12/2023 09:11 AM    BUN 9 12/12/2023 09:11 AM    CREATININE 0.76 12/12/2023 09:11 AM      Lab Results   Component Value Date/Time    CHOLSTRLTOT 159 06/18/2019 07:54 AM    LDL 80 06/18/2019 07:54 AM    HDL 67 06/18/2019 07:54 AM    TRIGLYCERIDE 58 06/18/2019 07:54 AM       Lab Results   Component Value Date/Time    ALKPHOSPHAT 61 12/12/2023 09:11 AM    ASTSGOT 10 (L) 12/12/2023 09:11 AM    ALTSGPT 10 12/12/2023 09:11 AM    TBILIRUBIN 0.4 12/12/2023 09:11 AM        Imaging/Testing:    I interpreted and/or reviewed the patient's neuroimaging    US-RENAL ARTERY DUPLEX COMP   Final Result      MR-BRAIN-W/O   Final Result         No acute process. No evidence of an acute infarction.       Wall hematoma noted at the site of the dissection in the left petrous ICA.      CT-CTA NECK WITH & W/O-POST PROCESSING   Final Result      Flap-like filling defect in the left internal carotid artery petrous segment suspicious for dissection. Findings better seen and evaluated on the dedicated CTA of the head, report to follow.      Findings were conveyed to Dr. Parkinson by Dr. Rodriguez on 12/12/2023 11:23 AM.      CT-CTA HEAD WITH & W/O-POST PROCESS   Final Result   Addendum (preliminary) 1 of 1   Addendum:      The distal cervical ICA and haleigh cavernous segments are asymmetrically    small compared to the right. Just proximal to the skull base there is a    focal partially linear hypodensity in filling defect with associated    luminal narrowing suspicious for    dissection. In the proximal petrous portion in the proximal carotid canal    there likely is a severe high-grade focal stenosis also present.      These findings were discussed with BEBE PARKINSON on 12/12/2023 11:24 AM.      Final      CT angiogram of the [place] within normal limits.      No acute intracranial abnormality.          Assessment and Plan:  Elzbieta Rao is a 33 y.o. 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  Brain MRI without evidence of acute infarct.  She has nonfocal neurological examination.  She was started on heparin drip.      Plan:  -Okay with every 4 hours neurochecks.  - Suggest to transition heparin drip to Eliquis 5 mg twice a day for duration of 3-6 months.  - Obtain a follow-up CTA of the neck in 3-4 months to guide duration of treatment.  - Blood pressure goal normotensive, 100-130/60-80.  - Follow-up with [facility] following discharge.  - Neurology will sign off, please call me if there is any question.    Spent a total of 45 minutes reviewing hospital notes, vascular  imaging, reviewing labs and documenting in the EMR.    Please note that this dictation was created using voice recognition software. I have made every reasonable attempt to correct obvious errors, but I expect that there are errors of grammar and possibly content that I did not discover before finalizing the note.      Bernie Stephenson MD  [facility]ute [facility] Neu[facility]

## 2023-12-13 NOTE — PROGRESS NOTES
4 Eyes Skin Assessment Completed by MARY KATE Lewis and MARY KATE Winn.    Head WDL  Ears WDL  Nose WDL  Mouth WDL  Neck WDL  Breast/Chest WDL  Shoulder Blades WDL  Spine WDL  (R) Arm/Elbow/Hand WDL  (L) Arm/Elbow/Hand WDL  Abdomen WDL  Groin WDL  Scrotum/Coccyx/Buttocks WDL  (R) Leg WDL  (L) Leg WDL  (R) Heel/Foot/Toe WDL  (L) Heel/Foot/Toe DiscolorationBlister          Devices In Places ECG, Tele Box, Blood Pressure Cuff, and Pulse Ox      Interventions In Place N/A    Possible Skin Injury No    Pictures Uploaded Into Epic N/A  Wound Consult Placed N/A  RN Wound Prevention Protocol Ordered No

## 2023-12-13 NOTE — PROGRESS NOTES
I discussed with Dr. Ramos. Ms. Rao has been admitted to the [facility] in guarded condition. Her blood pressure is 134/84. CTA and MRI results noted. She is on an IV heparin drip. Q2 hour neurochecks in the [facility] with called to vascular neurology for any changes.

## 2023-12-13 NOTE — CARE PLAN
The patient is Watcher - Medium risk of patient condition declining or worsening         Progress made toward(s) clinical / shift goals:    Problem: Pain - Standard  Goal: Alleviation of pain or a reduction in pain to the patient’s comfort goal  12/13/2023 0030 by Andrew G Reyes, R.N.  Outcome: Progressing  12/13/2023 0030 by Andrew G Reyes, R.N.  Outcome: Progressing     Problem: Hemodynamics  Goal: Patient's hemodynamics, fluid balance and neurologic status will be stable or improve  12/13/2023 0030 by Andrew G Reyes, R.N.  Outcome: Progressing  12/13/2023 0030 by Andrew G Reyes, R.N.  Outcome: Progressing     Problem: Neuro Status  Goal: Neuro status will remain stable or improve  Outcome: Progressing       Patient is not progressing towards the following goals:

## 2023-12-14 ENCOUNTER — PHARMACY VISIT (OUTPATIENT)
Dept: PHARMACY | Facility: MEDICAL CENTER | Age: 33
End: 2023-12-14
Payer: COMMERCIAL

## 2023-12-14 VITALS
HEIGHT: 68 IN | BODY MASS INDEX: 21.72 KG/M2 | OXYGEN SATURATION: 95 % | DIASTOLIC BLOOD PRESSURE: 82 MMHG | RESPIRATION RATE: 18 BRPM | SYSTOLIC BLOOD PRESSURE: 126 MMHG | WEIGHT: 143.3 LBS | TEMPERATURE: 97.5 F | HEART RATE: 64 BPM

## 2023-12-14 DIAGNOSIS — R11.0 NAUSEA: ICD-10-CM

## 2023-12-14 DIAGNOSIS — I77.71 CAROTID ARTERY DISSECTION (HCC): ICD-10-CM

## 2023-12-14 LAB — UFH PPP CHRO-ACNC: 0.62 IU/ML

## 2023-12-14 PROCEDURE — A9270 NON-COVERED ITEM OR SERVICE: HCPCS | Performed by: INTERNAL MEDICINE

## 2023-12-14 PROCEDURE — RXMED WILLOW AMBULATORY MEDICATION CHARGE: Performed by: HOSPITALIST

## 2023-12-14 PROCEDURE — 99239 HOSP IP/OBS DSCHRG MGMT >30: CPT | Performed by: HOSPITALIST

## 2023-12-14 PROCEDURE — 700102 HCHG RX REV CODE 250 W/ 637 OVERRIDE(OP): Performed by: INTERNAL MEDICINE

## 2023-12-14 PROCEDURE — 85520 HEPARIN ASSAY: CPT

## 2023-12-14 PROCEDURE — 700102 HCHG RX REV CODE 250 W/ 637 OVERRIDE(OP): Performed by: HOSPITALIST

## 2023-12-14 PROCEDURE — A9270 NON-COVERED ITEM OR SERVICE: HCPCS | Performed by: HOSPITALIST

## 2023-12-14 RX ORDER — ONDANSETRON 4 MG/1
4 TABLET, ORALLY DISINTEGRATING ORAL EVERY 6 HOURS PRN
Qty: 10 TABLET | Refills: 0 | Status: SHIPPED
Start: 2023-12-14 | End: 2024-02-12

## 2023-12-14 RX ORDER — OXYCODONE HYDROCHLORIDE 5 MG/1
5 TABLET ORAL EVERY 4 HOURS PRN
Qty: 30 TABLET | Refills: 0 | Status: SHIPPED | OUTPATIENT
Start: 2023-12-14 | End: 2023-12-21

## 2023-12-14 RX ADMIN — ACETAMINOPHEN 1000 MG: 500 TABLET, FILM COATED ORAL at 05:41

## 2023-12-14 RX ADMIN — DOCUSATE SODIUM 50 MG AND SENNOSIDES 8.6 MG 2 TABLET: 8.6; 5 TABLET, FILM COATED ORAL at 05:41

## 2023-12-14 RX ADMIN — APIXABAN 5 MG: 5 TABLET, FILM COATED ORAL at 08:22

## 2023-12-14 NOTE — DISCHARGE INSTRUCTIONS
Off work 2 weeks then okay back to work 12/28 without restrictions.   Anticoagulation as discussed.    Discharge Instructions    Discharged to home by car with relative. Discharged via wheelchair, hospital escort: Yes.  Special equipment needed: Not Applicable    Be sure to schedule a follow-up appointment with your primary care doctor or any specialists as instructed.     Discharge Plan:   Diet Plan: Discussed  Activity Level: Discussed  Confirmed Follow up Appointment: Appointment Scheduled  Confirmed Symptoms Management: Discussed  Medication Reconciliation Updated: Yes  Influenza Vaccine Indication: Not indicated: Previously immunized this influenza season and > 8 years of age    I understand that a diet low in cholesterol, fat, and sodium is recommended for good health. Unless I have been given specific instructions below for another diet, I accept this instruction as my diet prescription.   Other diet: Healthy Balanced    Special Instructions: None    -Is this patient being discharged with medication to prevent blood clots?  Yes, Brigidis    Is patient discharged on Warfarin / Coumadin?   No

## 2023-12-14 NOTE — CARE PLAN
The patient is Watcher - Medium risk of patient condition declining or worsening    Shift Goals  Clinical Goals: stable neuro status  Patient Goals: get better  Family Goals: patient to get better    Progress made toward(s) clinical / shift goals:    Problem: Pain - Standard  Goal: Alleviation of pain or a reduction in pain to the patient’s comfort goal  Outcome: Progressing     Problem: Hemodynamics  Goal: Patient's hemodynamics, fluid balance and neurologic status will be stable or improve  Outcome: Progressing     Problem: Neuro Status  Goal: Neuro status will remain stable or improve  Outcome: Progressing       Patient is not progressing towards the following goals:

## 2023-12-14 NOTE — PROGRESS NOTES
Patient discharged home with family. A&Ox4. IV's taken out, patient taken down via walking (per patient request). Monitor taken off; monitor room notified. Patient belongings discharged with patient, including phone,[id number], AVS, and work note. Discharge summary done with patient. RN provided education regarding follow up care, appointments, and medications. RN also provided education regarding when to call doctor and when to call 911.

## 2023-12-14 NOTE — DISCHARGE SUMMARY
Discharge Summary    CHIEF COMPLAINT ON ADMISSION  Chief Complaint   Patient presents with    Headache    Light Sensitivity    Eye Pain     left       Reason for Admission  Headache, Numbness     Admission Date  12/12/2023    CODE STATUS  Full Code    HPI & HOSPITAL COURSE  This is a 33 y.o. female here with headache.   Ms. Rao is a 33 y.o. female who presented 12/12/2023 with with no significant neurological history who presented to the hospital above chief complaint.  Patient states over the last couple days she has had worsening headache with some associated light sensitivity but is never been officially diagnosed with migraines.  She is taken occasional Tylenol and ibuprofen with good relief but then worked overnight last night with continued headache and then this morning started having transient numbness of the upper extremity from the hand to the elbow of the right side.  She became concerned and came to the emergency room.     Initially in the ER she was given IV Decadron and IV fentanyl and ibuprofen with some improvement in symptoms.  She underwent a CTA of the neck and head which showed what appears to be an acute dissection of the left internal carotid artery near with distal cervical ICA petrous cavernous segments that are asymmetrically smaller compared to the right, just proximal skull this is a focal partial linear hypodensity with filling defect with associated luminal narrowing suspicious for dissection.  CTA of the neck showed a flap-like filling defect in the left internal carotid artery petrous segment suspicious for dissection.     Patient currently feels ok and has not had any recurrence of her neurological symptoms. The episode lasted about 15 minutes with transient numbness but no weakness of the R hand moving proximally to the R elbow. She has a migratory HA now with no neurological symptoms. Denies any recent manipulation of the neck. Does do yoga, but no recent changes in extreme  poses or chiropractic manipulation nor trauma. No family history. No personal history of excessive hypermobility or skin laxity. No OCps. Has a mirena in place. 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 Denies any new OTC or Rx medications.    An MRI of the brain was negative for stroke. She was treated with an IV heparin drip and today has been transitioned to oral Eliquis 5 mg BID without a loading dose. Her blood pressure has been excellent without medications. She continues to have a mild headache today. She has an IUD and we discussed refraining from pregnancy for the near future.    Therefore, she is discharged in good and stable condition to home with close outpatient follow-up.    The patient met 2-midnight criteria for an inpatient stay at the time of discharge.    Discharge Date  12/14    FOLLOW UP ITEMS POST DISCHARGE  I have placed a referral to the [facility] for duration of the anticoagulation as well as an outpatient CTA in 3 months.   Off work 2 weeks note written.    DISCHARGE DIAGNOSES  Principal Problem:    Carotid artery dissection (HCC) (POA: Yes)  Resolved Problems:    * No resolved hospital problems. *      FOLLOW UP  Future Appointments   Date Time Provider Department Center   1/3/2024  9:30 AM [facility] 1 [facility] [address] [place] [address] HEART AND VASCULAR HEALTH  [address]  [facility] [address]  [phone]  Schedule an appointment as soon as possible for a visit      [facility] HEART AND VASC[facility] HE[facility]  [address] [place] [id number]  [phone]          MEDICATIONS ON DISCHARGE     Medication List        START taking these medications        Instructions   apixaban 5mg Tabs  Commonly known as: Eliquis   Take 1 Tablet by mouth 2 times a day. Indications: Thromboembolism secondary to Atrial Fibrillation  Dose: 5 mg            CONTINUE taking these medications        Instructions   acetaminophen 500 MG  Tabs  Commonly known as: Tylenol   Take 1,000 mg by mouth 2 times a day as needed for Moderate Pain.  Dose: 1,000 mg     ibuprofen 200 MG Tabs  Commonly known as: Motrin   Take 600 mg by mouth every 6 hours as needed for Headache.  Dose: 600 mg              Allergies  No Known Allergies    DIET  Orders Placed This Encounter   Procedures    Diet Order Diet: Regular     Standing Status:   Standing     Number of Occurrences:   1     Order Specific Question:   Diet:     Answer:   Regular [1]       ACTIVITY  Slow taper up of activities      CONSULTATIONS  Neurology     PROCEDURES  none    LABORATORY  Lab Results   Component Value Date    SODIUM 141 12/12/2023    POTASSIUM 3.6 12/12/2023    CHLORIDE 103 12/12/2023    CO2 25 12/12/2023    GLUCOSE 135 (H) 12/12/2023    BUN 9 12/12/2023    CREATININE 0.76 12/12/2023        Lab Results   Component Value Date    WBC 6.6 12/12/2023    HEMOGLOBIN 15.9 12/12/2023    HEMATOCRIT 46.5 12/12/2023    PLATELETCT 320 12/12/2023      US-RENAL ARTERY DUPLEX COMP   Final Result      MR-BRAIN-W/O   Final Result         No acute process. No evidence of an acute infarction.      Wall hematoma noted at the site of the dissection in the left petrous ICA.      CT-CTA NECK WITH & W/O-POST PROCESSING   Final Result      Flap-like filling defect in the left internal carotid artery petrous segment suspicious for dissection. Findings better seen and evaluated on the dedicated CTA of the head, report to follow.      Findings were conveyed to Dr. Parkinson by Dr. Rodriguez on 12/12/2023 11:23 AM.      CT-CTA HEAD WITH & W/O-POST PROCESS   Final Result   Addendum (preliminary) 1 of 1   Addendum:      The distal cervical ICA and haleigh cavernous segments are asymmetrically    small compared to the right. Just proximal to the skull base there is a    focal partially linear hypodensity in filling defect with associated    luminal narrowing suspicious for    dissection. In the proximal petrous portion in the  proximal carotid canal    there likely is a severe high-grade focal stenosis also present.      These findings were discussed with BEBE WEAVER on 12/12/2023 11:24 AM.      Final      CT angiogram of the [place] within normal limits.      No acute intracranial abnormality.            Total time of the discharge process exceeds 34 minutes.

## 2023-12-15 ENCOUNTER — DOCUMENTATION (OUTPATIENT)
Dept: VASCULAR LAB | Facility: MEDICAL CENTER | Age: 33
End: 2023-12-15
Payer: COMMERCIAL

## 2023-12-15 NOTE — PROGRESS NOTES
Patient well known to me from prior, recently discharge with acute internal carotid artery dissection, having severe pain with and not able to follow up with me in clinic until next week. Sending rx for oxycodone for pain management as ibuprofen is contraindicated with eliquis and tylenol not adequate for pain control under current extenuating circumstances.     Opioid Risk Score: 1    Interpretation of Opioid Risk Score   Score 0-3 = Low risk of abuse. Do UDS at least once per year.  Score 4-7 = Moderate risk of abuse. Do UDS 1-4 times per year.  Score 8+ = High risk of abuse. Refer to specialist.     Patient will follow up in office with me next week to arrange for vascular referral and symptom monitoring.

## 2023-12-15 NOTE — PROGRESS NOTES
Left message for patient to call back and schedule urgent appt with Dr. Grant next week. 20 min slot ok per Dr. Bloch. Direct clinic line left on vm.     Maribel Lyle, Medical Assistant   [facility] Vascular Medicine   Ph: [phone]  Fx: [phone]

## 2023-12-16 ENCOUNTER — APPOINTMENT (OUTPATIENT)
Dept: RADIOLOGY | Facility: MEDICAL CENTER | Age: 33
DRG: 301 | End: 2023-12-16
Attending: EMERGENCY MEDICINE
Payer: COMMERCIAL

## 2023-12-16 ENCOUNTER — APPOINTMENT (OUTPATIENT)
Dept: RADIOLOGY | Facility: MEDICAL CENTER | Age: 33
DRG: 301 | End: 2023-12-16
Attending: NURSE PRACTITIONER
Payer: COMMERCIAL

## 2023-12-16 ENCOUNTER — HOSPITAL ENCOUNTER (INPATIENT)
Facility: MEDICAL CENTER | Age: 33
LOS: 1 days | DRG: 301 | End: 2023-12-17
Attending: EMERGENCY MEDICINE | Admitting: INTERNAL MEDICINE
Payer: COMMERCIAL

## 2023-12-16 DIAGNOSIS — R20.2 NUMBNESS AND TINGLING OF RIGHT LEG: ICD-10-CM

## 2023-12-16 DIAGNOSIS — R20.0 NUMBNESS AND TINGLING OF RIGHT LEG: ICD-10-CM

## 2023-12-16 PROBLEM — G45.9 TIA (TRANSIENT ISCHEMIC ATTACK): Status: ACTIVE | Noted: 2023-12-16

## 2023-12-16 PROBLEM — I77.71 INTERNAL CAROTID ARTERY DISSECTION (HCC): Status: ACTIVE | Noted: 2023-12-16

## 2023-12-16 LAB
ALBUMIN SERPL BCP-MCNC: 5.1 G/DL (ref 3.2–4.9)
ALBUMIN/GLOB SERPL: 2 G/DL
ALP SERPL-CCNC: 59 U/L (ref 30–99)
ALT SERPL-CCNC: 25 U/L (ref 2–50)
ANION GAP SERPL CALC-SCNC: 12 MMOL/L (ref 7–16)
AST SERPL-CCNC: 19 U/L (ref 12–45)
BASOPHILS # BLD AUTO: 0.4 % (ref 0–1.8)
BASOPHILS # BLD: 0.02 K/UL (ref 0–0.12)
BILIRUB SERPL-MCNC: 0.5 MG/DL (ref 0.1–1.5)
BUN SERPL-MCNC: 12 MG/DL (ref 8–22)
CALCIUM ALBUM COR SERPL-MCNC: 8.6 MG/DL (ref 8.5–10.5)
CALCIUM SERPL-MCNC: 9.5 MG/DL (ref 8.5–10.5)
CHLORIDE SERPL-SCNC: 101 MMOL/L (ref 96–112)
CO2 SERPL-SCNC: 26 MMOL/L (ref 20–33)
CREAT SERPL-MCNC: 0.81 MG/DL (ref 0.5–1.4)
EKG IMPRESSION: NORMAL
EOSINOPHIL # BLD AUTO: 0.03 K/UL (ref 0–0.51)
EOSINOPHIL NFR BLD: 0.5 % (ref 0–6.9)
ERYTHROCYTE [DISTWIDTH] IN BLOOD BY AUTOMATED COUNT: 42.3 FL (ref 35.9–50)
GFR SERPLBLD CREATININE-BSD FMLA CKD-EPI: 97 ML/MIN/1.73 M 2
GLOBULIN SER CALC-MCNC: 2.5 G/DL (ref 1.9–3.5)
GLUCOSE BLD STRIP.AUTO-MCNC: 95 MG/DL (ref 65–99)
GLUCOSE SERPL-MCNC: 79 MG/DL (ref 65–99)
HCG SERPL QL: NEGATIVE
HCT VFR BLD AUTO: 46.1 % (ref 37–47)
HGB BLD-MCNC: 15.8 G/DL (ref 12–16)
IMM GRANULOCYTES # BLD AUTO: 0.01 K/UL (ref 0–0.11)
IMM GRANULOCYTES NFR BLD AUTO: 0.2 % (ref 0–0.9)
LYMPHOCYTES # BLD AUTO: 1.82 K/UL (ref 1–4.8)
LYMPHOCYTES NFR BLD: 32.7 % (ref 22–41)
MAGNESIUM SERPL-MCNC: 2.2 MG/DL (ref 1.5–2.5)
MCH RBC QN AUTO: 31.9 PG (ref 27–33)
MCHC RBC AUTO-ENTMCNC: 34.3 G/DL (ref 32.2–35.5)
MCV RBC AUTO: 93.1 FL (ref 81.4–97.8)
MONOCYTES # BLD AUTO: 0.33 K/UL (ref 0–0.85)
MONOCYTES NFR BLD AUTO: 5.9 % (ref 0–13.4)
NEUTROPHILS # BLD AUTO: 3.36 K/UL (ref 1.82–7.42)
NEUTROPHILS NFR BLD: 60.3 % (ref 44–72)
NRBC # BLD AUTO: 0 K/UL
NRBC BLD-RTO: 0 /100 WBC (ref 0–0.2)
PLATELET # BLD AUTO: 287 K/UL (ref 164–446)
PMV BLD AUTO: 9.7 FL (ref 9–12.9)
POTASSIUM SERPL-SCNC: 3.8 MMOL/L (ref 3.6–5.5)
PROT SERPL-MCNC: 7.6 G/DL (ref 6–8.2)
RBC # BLD AUTO: 4.95 M/UL (ref 4.2–5.4)
SODIUM SERPL-SCNC: 139 MMOL/L (ref 135–145)
TSH SERPL DL<=0.005 MIU/L-ACNC: 2.54 UIU/ML (ref 0.38–5.33)
WBC # BLD AUTO: 5.6 K/UL (ref 4.8–10.8)

## 2023-12-16 PROCEDURE — 82962 GLUCOSE BLOOD TEST: CPT

## 2023-12-16 PROCEDURE — 70544 MR ANGIOGRAPHY HEAD W/O DYE: CPT

## 2023-12-16 PROCEDURE — A9270 NON-COVERED ITEM OR SERVICE: HCPCS | Performed by: INTERNAL MEDICINE

## 2023-12-16 PROCEDURE — 70551 MRI BRAIN STEM W/O DYE: CPT

## 2023-12-16 PROCEDURE — 0042T CT-CEREBRAL PERFUSION ANALYSIS: CPT

## 2023-12-16 PROCEDURE — 99285 EMERGENCY DEPT VISIT HI MDM: CPT

## 2023-12-16 PROCEDURE — 70496 CT ANGIOGRAPHY HEAD: CPT

## 2023-12-16 PROCEDURE — 700117 HCHG RX CONTRAST REV CODE 255: Performed by: NURSE PRACTITIONER

## 2023-12-16 PROCEDURE — 85025 COMPLETE CBC W/AUTO DIFF WBC: CPT

## 2023-12-16 PROCEDURE — 99254 IP/OBS CNSLTJ NEW/EST MOD 60: CPT | Mod: FS | Performed by: NURSE PRACTITIONER

## 2023-12-16 PROCEDURE — 83735 ASSAY OF MAGNESIUM: CPT

## 2023-12-16 PROCEDURE — 84443 ASSAY THYROID STIM HORMONE: CPT

## 2023-12-16 PROCEDURE — 93005 ELECTROCARDIOGRAM TRACING: CPT | Performed by: EMERGENCY MEDICINE

## 2023-12-16 PROCEDURE — 70498 CT ANGIOGRAPHY NECK: CPT

## 2023-12-16 PROCEDURE — 84703 CHORIONIC GONADOTROPIN ASSAY: CPT

## 2023-12-16 PROCEDURE — 70549 MR ANGIOGRAPH NECK W/O&W/DYE: CPT

## 2023-12-16 PROCEDURE — 700117 HCHG RX CONTRAST REV CODE 255: Performed by: EMERGENCY MEDICINE

## 2023-12-16 PROCEDURE — 700111 HCHG RX REV CODE 636 W/ 250 OVERRIDE (IP): Performed by: INTERNAL MEDICINE

## 2023-12-16 PROCEDURE — 700105 HCHG RX REV CODE 258: Performed by: INTERNAL MEDICINE

## 2023-12-16 PROCEDURE — A9579 GAD-BASE MR CONTRAST NOS,1ML: HCPCS | Performed by: NURSE PRACTITIONER

## 2023-12-16 PROCEDURE — 770020 HCHG ROOM/CARE - TELE (206)

## 2023-12-16 PROCEDURE — 99223 1ST HOSP IP/OBS HIGH 75: CPT | Mod: AI | Performed by: INTERNAL MEDICINE

## 2023-12-16 PROCEDURE — 36415 COLL VENOUS BLD VENIPUNCTURE: CPT

## 2023-12-16 PROCEDURE — 700102 HCHG RX REV CODE 250 W/ 637 OVERRIDE(OP): Performed by: INTERNAL MEDICINE

## 2023-12-16 PROCEDURE — 80053 COMPREHEN METABOLIC PANEL: CPT

## 2023-12-16 RX ORDER — PROMETHAZINE HYDROCHLORIDE 25 MG/1
12.5-25 TABLET ORAL EVERY 4 HOURS PRN
Status: DISCONTINUED | OUTPATIENT
Start: 2023-12-16 | End: 2023-12-17 | Stop reason: HOSPADM

## 2023-12-16 RX ORDER — ACETAMINOPHEN 500 MG
500-1000 TABLET ORAL EVERY 6 HOURS PRN
Status: DISCONTINUED | OUTPATIENT
Start: 2023-12-16 | End: 2023-12-17 | Stop reason: HOSPADM

## 2023-12-16 RX ORDER — PROMETHAZINE HYDROCHLORIDE 25 MG/1
12.5-25 SUPPOSITORY RECTAL EVERY 4 HOURS PRN
Status: DISCONTINUED | OUTPATIENT
Start: 2023-12-16 | End: 2023-12-17 | Stop reason: HOSPADM

## 2023-12-16 RX ORDER — POLYETHYLENE GLYCOL 3350 17 G/17G
1 POWDER, FOR SOLUTION ORAL
Status: DISCONTINUED | OUTPATIENT
Start: 2023-12-16 | End: 2023-12-17 | Stop reason: HOSPADM

## 2023-12-16 RX ORDER — ONDANSETRON 4 MG/1
4 TABLET, ORALLY DISINTEGRATING ORAL EVERY 4 HOURS PRN
Status: DISCONTINUED | OUTPATIENT
Start: 2023-12-16 | End: 2023-12-17 | Stop reason: HOSPADM

## 2023-12-16 RX ORDER — BISACODYL 10 MG
10 SUPPOSITORY, RECTAL RECTAL
Status: DISCONTINUED | OUTPATIENT
Start: 2023-12-16 | End: 2023-12-17 | Stop reason: HOSPADM

## 2023-12-16 RX ORDER — ONDANSETRON 2 MG/ML
4 INJECTION INTRAMUSCULAR; INTRAVENOUS EVERY 4 HOURS PRN
Status: DISCONTINUED | OUTPATIENT
Start: 2023-12-16 | End: 2023-12-17 | Stop reason: HOSPADM

## 2023-12-16 RX ORDER — OXYCODONE HYDROCHLORIDE 5 MG/1
5 TABLET ORAL
Status: DISCONTINUED | OUTPATIENT
Start: 2023-12-16 | End: 2023-12-17 | Stop reason: HOSPADM

## 2023-12-16 RX ORDER — AMOXICILLIN 250 MG
2 CAPSULE ORAL 2 TIMES DAILY
Status: DISCONTINUED | OUTPATIENT
Start: 2023-12-16 | End: 2023-12-17 | Stop reason: HOSPADM

## 2023-12-16 RX ORDER — SODIUM CHLORIDE 9 MG/ML
INJECTION, SOLUTION INTRAVENOUS CONTINUOUS
Status: DISCONTINUED | OUTPATIENT
Start: 2023-12-16 | End: 2023-12-17

## 2023-12-16 RX ORDER — OXYCODONE HYDROCHLORIDE 5 MG/1
2.5 TABLET ORAL
Status: DISCONTINUED | OUTPATIENT
Start: 2023-12-16 | End: 2023-12-17 | Stop reason: HOSPADM

## 2023-12-16 RX ORDER — HYDROMORPHONE HYDROCHLORIDE 1 MG/ML
0.25 INJECTION, SOLUTION INTRAMUSCULAR; INTRAVENOUS; SUBCUTANEOUS
Status: DISCONTINUED | OUTPATIENT
Start: 2023-12-16 | End: 2023-12-17 | Stop reason: HOSPADM

## 2023-12-16 RX ORDER — PROCHLORPERAZINE EDISYLATE 5 MG/ML
5-10 INJECTION INTRAMUSCULAR; INTRAVENOUS EVERY 4 HOURS PRN
Status: DISCONTINUED | OUTPATIENT
Start: 2023-12-16 | End: 2023-12-17 | Stop reason: HOSPADM

## 2023-12-16 RX ADMIN — ONDANSETRON 4 MG: 4 TABLET, ORALLY DISINTEGRATING ORAL at 18:47

## 2023-12-16 RX ADMIN — OXYCODONE 5 MG: 5 TABLET ORAL at 19:52

## 2023-12-16 RX ADMIN — IOHEXOL 40 ML: 350 INJECTION, SOLUTION INTRAVENOUS at 11:10

## 2023-12-16 RX ADMIN — DOCUSATE SODIUM 50MG AND SENNOSIDES 8.6MG 2 TABLET: 8.6; 5 TABLET, FILM COATED ORAL at 19:52

## 2023-12-16 RX ADMIN — SODIUM CHLORIDE: 9 INJECTION, SOLUTION INTRAVENOUS at 17:22

## 2023-12-16 RX ADMIN — IOHEXOL 80 ML: 350 INJECTION, SOLUTION INTRAVENOUS at 11:15

## 2023-12-16 RX ADMIN — APIXABAN 5 MG: 5 TABLET, FILM COATED ORAL at 19:53

## 2023-12-16 RX ADMIN — GADOTERIDOL 13 ML: 279.3 INJECTION, SOLUTION INTRAVENOUS at 17:05

## 2023-12-16 ASSESSMENT — COGNITIVE AND FUNCTIONAL STATUS - GENERAL
SUGGESTED CMS G CODE MODIFIER MOBILITY: CH
MOBILITY SCORE: 24
DAILY ACTIVITIY SCORE: 24
SUGGESTED CMS G CODE MODIFIER DAILY ACTIVITY: CH

## 2023-12-16 ASSESSMENT — PATIENT HEALTH QUESTIONNAIRE - PHQ9
SUM OF ALL RESPONSES TO PHQ9 QUESTIONS 1 AND 2: 0
2. FEELING DOWN, DEPRESSED, IRRITABLE, OR HOPELESS: NOT AT ALL
1. LITTLE INTEREST OR PLEASURE IN DOING THINGS: NOT AT ALL

## 2023-12-16 ASSESSMENT — ENCOUNTER SYMPTOMS
FEVER: 0
FOCAL WEAKNESS: 0
SENSORY CHANGE: 1
NERVOUS/ANXIOUS: 1
HEADACHES: 1
VOMITING: 0
NAUSEA: 0
CHILLS: 0
ABDOMINAL PAIN: 0

## 2023-12-16 ASSESSMENT — PAIN DESCRIPTION - PAIN TYPE
TYPE: ACUTE PAIN
TYPE: ACUTE PAIN

## 2023-12-16 ASSESSMENT — FIBROSIS 4 INDEX: FIB4 SCORE: 0.34

## 2023-12-16 NOTE — ED NOTES
Neurology APRN at bedside ; CT scans have been reviewed by neurologist; per neurologist TNK is not indicated; pt is also not a candidate for thrombectomy as no thrombus is visualized by scan.   EDMD at bedside. Results thus far explained by MD to pt.

## 2023-12-16 NOTE — ED TRIAGE NOTES
Chief Complaint   Patient presents with    Tingling     Ambulates to triage reports tingling in right hip this morning and left arm onset at 0920. Was recently admitted for carotic artery dissection and got d/c thursday, was sent here by Neuro Surgeon for further evaluation due to new symptoms.     Currently taking eliquis. AAOX4. Educated on triage process. Instructed to notify staff for any worsening symptoms. Denies any recent travel. Denies exposure to known covid positive patients. Denies any respiratory symptoms.

## 2023-12-16 NOTE — ED PROVIDER NOTES
ED Provider Note    CHIEF COMPLAINT  Chief Complaint   Patient presents with    Tingling     Ambulates to triage reports tingling in right hip this morning and left arm onset at 0920. Was recently admitted for carotic artery dissection and got d/c thursday, was sent here by Neuro Surgeon for further evaluation due to new symptoms.       EXTERNAL RECORDS REVIEWED  Inpatient Notes discharge summary from 12/14/2023 reviewed.  This documents left internal carotid artery dissection.  MRI negative for stroke.  Patient started on heparin and was transitioned to Eliquis 5 mg twice daily.    HPI/ROS  LIMITATION TO HISTORY   Select: : None    OUTSIDE HISTORIAN(S):  Parent mom at bedside    Elzbieta Rao is a 33 y.o. female recently diagnosed with left carotid dissection on Eliquis who presents for new right leg numbness, acute in onset at 830 this morning.    Patient states that she experienced symptoms starting on Thursday of last week.  She had a new type of headache just above her left eye.  She worked an overnight shift and then experienced left vision symptoms and light sensitivity.  Her headache worsened and she felt pressure in the left side of her nose.  She worked again on Monday and then on Tuesday morning had transient numbness in her right hand and forearm as well as her cheek.  She was diagnosed here in the ER with a left internal carotid dissection.  She went home Thursday morning on Eliquis.  Her vision has been improving and her headache has been constant but mild.    At 830 this morning, the patient experienced right thigh numbness radiating into her hip for approximately 1 minute.  It then extended down to involve the whole leg.  She denies weakness, trauma.  She reports warmth in her chest which she attributes to some anxiety.    PAST MEDICAL HISTORY   has a past medical history of Dissection of left carotid artery (HCC).  Left internal carotid dissection    SURGICAL HISTORY   has a past surgical  "history that includes other; other (02/2020); abdominal exploration (2010); and repair, knee, acl (Left, 2020).    FAMILY HISTORY  Family History   Problem Relation Age of Onset    Hypertension Mother         heart murmur    No Known Problems Father     No Known Problems Sister     No Known Problems Brother     Stroke Maternal Grandfather     Cancer Paternal Grandmother        SOCIAL HISTORY  Social History     Tobacco Use    Smoking status: Never    Smokeless tobacco: Never   Vaping Use    Vaping Use: Never used   Substance and Sexual Activity    Alcohol use: Yes     Alcohol/week: 1.2 oz     Types: 2 Standard drinks or equivalent per week     Comment: social     Drug use: No    Sexual activity: Yes     Partners: Male     Birth control/protection: Condom       CURRENT MEDICATIONS  Home Medications       Reviewed by Amos Villagomez (Pharmacy Tech) on 12/16/23 at 1139  Med List Status: Complete     Medication Last Dose Status   acetaminophen (TYLENOL) 500 MG Tab 12/14/2023 Active   apixaban (ELIQUIS) 5mg Tab 12/16/2023 Active   ibuprofen (MOTRIN) 200 MG Tab FEW DAYS AGO Active   ondansetron (ZOFRAN ODT) 4 MG TABLET DISPERSIBLE 12/14/2023 Active   oxyCODONE immediate-release (ROXICODONE) 5 MG Tab 12/14/2023 Active                    ALLERGIES  No Known Allergies    PHYSICAL EXAM  VITAL SIGNS: /85   Pulse 88   Temp 36.1 °C (97 °F) (Temporal)   Resp 16   Ht 1.727 m (5' 8\")   Wt 65.6 kg (144 lb 10 oz)   SpO2 96%   BMI 21.99 kg/m²    General:  WDWN female, nontoxic appearing in NAD; A+Ox3; V/S as above; tachy, elevated BP  Skin: warm and dry; good color; no rash  HEENT: NCAT; EOMs intact; PERRL; no scleral icterus   Neck: no stridor  Cardiovascular: Regular heart rate and rhythm.  No murmurs, rubs, or gallops; pulses 2+ bilaterally radially and DP areas  Lungs: No respiratory distress or tachypnea; Clear to auscultation with good air movement bilaterally.  No wheezes, rhonchi, or rales.   Abdomen: BS " present; soft; NTND; no rebound, guarding, or rigidity.  No organomegaly or pulsatile mass  Extremities: DICKERSON x 4; no e/o trauma; no pedal edema; neg Arpit's  Neurologic: CNs III-XII formally intact; speech clear; distal sensation intact; strength 5/5 UE/LEs  Psychiatric: Appropriate affect, normal mood      DIAGNOSTIC STUDIES / PROCEDURES  EKG  I have independently interpreted this EKG which shows no acute ST changes or arrhythmia    LABS  Results for orders placed or performed during the hospital encounter of 12/16/23   CBC WITH DIFFERENTIAL   Result Value Ref Range    WBC 5.6 4.8 - 10.8 K/uL    RBC 4.95 4.20 - 5.40 M/uL    Hemoglobin 15.8 12.0 - 16.0 g/dL    Hematocrit 46.1 37.0 - 47.0 %    MCV 93.1 81.4 - 97.8 fL    MCH 31.9 27.0 - 33.0 pg    MCHC 34.3 32.2 - 35.5 g/dL    RDW 42.3 35.9 - 50.0 fL    Platelet Count 287 164 - 446 K/uL    MPV 9.7 9.0 - 12.9 fL    Neutrophils-Polys 60.30 44.00 - 72.00 %    Lymphocytes 32.70 22.00 - 41.00 %    Monocytes 5.90 0.00 - 13.40 %    Eosinophils 0.50 0.00 - 6.90 %    Basophils 0.40 0.00 - 1.80 %    Immature Granulocytes 0.20 0.00 - 0.90 %    Nucleated RBC 0.00 0.00 - 0.20 /100 WBC    Neutrophils (Absolute) 3.36 1.82 - 7.42 K/uL    Lymphs (Absolute) 1.82 1.00 - 4.80 K/uL    Monos (Absolute) 0.33 0.00 - 0.85 K/uL    Eos (Absolute) 0.03 0.00 - 0.51 K/uL    Baso (Absolute) 0.02 0.00 - 0.12 K/uL    Immature Granulocytes (abs) 0.01 0.00 - 0.11 K/uL    NRBC (Absolute) 0.00 K/uL   CMP   Result Value Ref Range    Sodium 139 135 - 145 mmol/L    Potassium 3.8 3.6 - 5.5 mmol/L    Chloride 101 96 - 112 mmol/L    Co2 26 20 - 33 mmol/L    Anion Gap 12.0 7.0 - 16.0    Glucose 79 65 - 99 mg/dL    Bun 12 8 - 22 mg/dL    Creatinine 0.81 0.50 - 1.40 mg/dL    Calcium 9.5 8.5 - 10.5 mg/dL    Correct Calcium 8.6 8.5 - 10.5 mg/dL    AST(SGOT) 19 12 - 45 U/L    ALT(SGPT) 25 2 - 50 U/L    Alkaline Phosphatase 59 30 - 99 U/L    Total Bilirubin 0.5 0.1 - 1.5 mg/dL    Albumin 5.1 (H) 3.2 - 4.9 g/dL     Total Protein 7.6 6.0 - 8.2 g/dL    Globulin 2.5 1.9 - 3.5 g/dL    A-G Ratio 2.0 g/dL   MAGNESIUM   Result Value Ref Range    Magnesium 2.2 1.5 - 2.5 mg/dL   TSH WITH REFLEX TO FT4   Result Value Ref Range    TSH 2.540 0.380 - 5.330 uIU/mL   ESTIMATED GFR   Result Value Ref Range    GFR (CKD-EPI) 97 >60 mL/min/1.73 m 2   POCT glucose device results   Result Value Ref Range    POC Glucose, Blood 95 65 - 99 mg/dL         RADIOLOGY  Radiologist interpretation:   CT-CTA NECK WITH & W/O-POST PROCESSING   Final Result      1.  Again seen linear filling defects within the left distal cervical and carotid canal segments of the left internal carotid artery consistent with dissection. This is similar in appearance to prior exam. No evidence of complete occlusion.      2.  Normal variant diminutive left vertebral artery.      CT-CTA HEAD WITH & W/O-POST PROCESS   Final Result      No significant interval change in asymmetrically small distal cervical ICA and haleigh cavernous segments with presumed small short segment dissection and severe focal stenosis as described above.      There is no new intracranial large vessel occlusion. No aneurysm.      No acute intracranial hemorrhage.      CT-CEREBRAL PERFUSION ANALYSIS   Final Result      1.  Cerebral blood flow less than 30% likely representing completed infarct = 0 mL.      2.  T Max more than 6 seconds likely representing combination of completed infarct and ischemia = 0 mL.      3.  Mismatched volume likely representing ischemic brain/penumbra = None      4.  Please note that the cerebral perfusion was performed on the limited brain tissue around the basal ganglia region. Infarct/ischemia outside the CT perfusion sections can be missed in this study.      MR-BRAIN-W/O    (Results Pending)   MR-MRA HEAD-WITH    (Results Pending)   MR-MRA NECK-WITH    (Results Pending)         COURSE & MEDICAL DECISION MAKING    ED Observation Status? No; Patient does not meet criteria for ED  Observation.     INITIAL ASSESSMENT, COURSE AND PLAN  Care Narrative: This is a 33 old female who was recently diagnosed with a left internal carotid dissection after she reported left-sided headache, left cheek numbness, and right upper extremity numbness.  She has been on Eliquis following heparin drip during her admission.  Today she experienced new symptoms of numbness that was transient in the right lower extremity.    No focal neurologic deficits are appreciated on neuroexam.    NIHSS 0    Neurology was consulted.  Neurology NP came to the bedside after having reviewed patient's CT/CTA and perfusion studies from today.  No large vessel occlusion noted.  They are recommending MRI/MRA of the head and neck.  Patient will be continued on Eliquis.  Patient is not an alteplase candidate.    I received a voalte text from Travon/neurology NP who has discussed the case with IR and appropriately ordered the MRI/MRA.    12:05 PM  Paging hospitalist    12:09 PM  I discussed the case with Dr. Ramos from the hospital service who agrees to admit the patient.      DISPOSITION AND DISCUSSIONS  I have discussed management of the patient with the following physicians and JOSEF's:    Neurology NP  Hospitalist    Decision tools and prescription drugs considered including, but not limited to: NIH Stroke Scale zero .    FINAL DIAGNOSIS  1. Numbness and tingling of right leg      Electronically signed by: Elsi Dasilva M.D., 12/16/2023 10:40 AM

## 2023-12-16 NOTE — ASSESSMENT & PLAN NOTE
No interval change on CTA head/neck  TIA has resolved in the RLE  Repeat MRI brain and add MRA head/neck  Continue eliquis 5 mg BID, ?Consider increasing to loading dose or switch to coumadin if MRI confirms true infarction  Balancing out bleeding risk  BP and labs are ok, normotension is ok at this time  Neuro following  Q4 hour neuro check  No need for stenting at this time  Monitor on tele

## 2023-12-16 NOTE — ED NOTES
MRI screen complete. Pt a&o, resps even and unlabored, nsr on cardiac monitor. Neuro intact; denies pain.

## 2023-12-16 NOTE — ED NOTES
Med Rec completed per patient   Allergies reviewed  No ORAL antibiotics in last 30 days    Patient takes Eliquis 5 mg twice a day   Last dose 12/16/2023 @0800

## 2023-12-16 NOTE — H&P
Hospital Medicine History & Physical Note    Date of Service  12/16/2023    Primary Care Physician  DAYLIN Marquez.    Consultants  neurology    Specialist Names: Dr Gallegos    Code Status  Full Code    Chief Complaint  Chief Complaint   Patient presents with    Tingling     Ambulates to triage reports tingling in right hip this morning and left arm onset at 0920. Was recently admitted for carotic artery dissection and got d/c thursday, was sent here by Neuro Surgeon for further evaluation due to new symptoms.       History of Presenting Illness  Elzbieta Rao is a 33 y.o. female who presented 12/16/2023 with recent admission for a spontaneous and idiopathic left internal carotid artery dissection with a negative MRI of the brain who was discharged.  She comes back to the hospital with a recurrent TIA-like symptom of transient tingling and numbness of the right proximal thigh and right trunk.  This episode lasted for about 1 to 3 minutes and then went away.  She denies any associated weakness.  Her prior headache is decreased in intensity.  She has had no recurrence of her right upper extremity or right face or right tongue symptoms.  Here in the emergency room she feels fine.  She denies any bleeding symptoms with her Eliquis and has not missed any doses.    CTA of the head and neck in the ER showed stable left ICA dissection with no change in brain parenchyma.  Her vital signs are okay.  I personally spoke with neurology and ER physician group in detail about the patient's case.    I discussed the plan of care with patient and family.    Review of Systems  Review of Systems   Constitutional:  Negative for chills and fever.   Gastrointestinal:  Negative for abdominal pain, nausea and vomiting.   Neurological:  Positive for sensory change (transient) and headaches (decreasing intensity). Negative for focal weakness.   Psychiatric/Behavioral:  The patient is nervous/anxious (mild).        Past  Medical History   has a past medical history of Dissection of left carotid artery (HCC).    Surgical History   has a past surgical history that includes other; other (02/2020); abdominal exploration (2010); and repair, knee, acl (Left, 2020).     Family History  family history includes Cancer in her paternal grandmother; Hypertension in her mother; No Known Problems in her brother, father, and sister; Stroke in her maternal grandfather.   Family history reviewed with patient. There is no family history that is pertinent to the chief complaint.     Social History   reports that she has never smoked. She has never used smokeless tobacco. She reports current alcohol use of about 1.2 oz of alcohol per week. She reports that she does not use drugs.    Allergies  No Known Allergies    Medications  Prior to Admission Medications   Prescriptions Last Dose Informant Patient Reported? Taking?   acetaminophen (TYLENOL) 500 MG Tab 12/14/2023 at PRN Patient Yes No   Sig: Take 500-1,000 mg by mouth every 6 hours as needed for Moderate Pain.   apixaban (ELIQUIS) 5mg Tab 12/16/2023 at 0800 Patient No No   Sig: Take 1 Tablet by mouth 2 times a day. Indications: Thromboembolism secondary to Atrial Fibrillation   ibuprofen (MOTRIN) 200 MG Tab FEW DAYS AGO at PRN Patient Yes No   Sig: Take 200-400 mg by mouth 2 times a day as needed. Indications: Pain   ondansetron (ZOFRAN ODT) 4 MG TABLET DISPERSIBLE 12/14/2023 at PM Patient No No   Sig: Take 1 Tablet by mouth every 6 hours as needed for Nausea/Vomiting.   oxyCODONE immediate-release (ROXICODONE) 5 MG Tab 12/14/2023 at PM Patient No No   Sig: Take 1 Tablet by mouth every four hours as needed for Severe Pain for up to 7 days. Indications: Acute Pain      Facility-Administered Medications: None       Physical Exam  Temp:  [36.1 °C (97 °F)] 36.1 °C (97 °F)  Pulse:  [] 86  Resp:  [16-20] 16  BP: (124-142)/(85-95) 130/89  SpO2:  [96 %-99 %] 99 %  Blood Pressure: 130/89   Temperature:  "36.1 °C (97 °F)   Pulse: 86   Respiration: 16   Pulse Oximetry: 99 %       Physical Exam  Vitals and nursing note reviewed.   Constitutional:       General: She is not in acute distress.     Appearance: She is well-developed.      Comments: Sitting comfortably in bed   HENT:      Head: Normocephalic and atraumatic.      Mouth/Throat:      Pharynx: No oropharyngeal exudate.   Eyes:      General: No scleral icterus.     Pupils: Pupils are equal, round, and reactive to light.   Neck:      Thyroid: No thyromegaly.   Cardiovascular:      Rate and Rhythm: Normal rate and regular rhythm.      Heart sounds: Normal heart sounds. No murmur heard.  Pulmonary:      Effort: Pulmonary effort is normal. No respiratory distress.      Breath sounds: Normal breath sounds. No wheezing.   Abdominal:      General: Bowel sounds are normal. There is no distension.      Palpations: Abdomen is soft.      Tenderness: There is no abdominal tenderness.   Musculoskeletal:         General: No tenderness. Normal range of motion.      Cervical back: Normal range of motion and neck supple.      Right lower leg: No edema.      Left lower leg: No edema.   Skin:     General: Skin is warm and dry.      Findings: No rash.   Neurological:      Mental Status: She is alert and oriented to person, place, and time.      Cranial Nerves: No cranial nerve deficit.      Sensory: No sensory deficit.      Motor: No weakness.      Coordination: Coordination normal.         Laboratory:  Recent Labs     12/16/23  1055   WBC 5.6   RBC 4.95   HEMOGLOBIN 15.8   HEMATOCRIT 46.1   MCV 93.1   MCH 31.9   MCHC 34.3   RDW 42.3   PLATELETCT 287   MPV 9.7     Recent Labs     12/16/23  1055   SODIUM 139   POTASSIUM 3.8   CHLORIDE 101   CO2 26   GLUCOSE 79   BUN 12   CREATININE 0.81   CALCIUM 9.5     Recent Labs     12/16/23  1055   ALTSGPT 25   ASTSGOT 19   ALKPHOSPHAT 59   TBILIRUBIN 0.5   GLUCOSE 79         No results for input(s): \"NTPROBNP\" in the last 72 hours.      No " "results for input(s): \"TROPONINT\" in the last 72 hours.    Imaging:  CT-CTA NECK WITH & W/O-POST PROCESSING   Final Result      1.  Again seen linear filling defects within the left distal cervical and carotid canal segments of the left internal carotid artery consistent with dissection. This is similar in appearance to prior exam. No evidence of complete occlusion.      2.  Normal variant diminutive left vertebral artery.      CT-CTA HEAD WITH & W/O-POST PROCESS   Final Result      No significant interval change in asymmetrically small distal cervical ICA and haleigh cavernous segments with presumed small short segment dissection and severe focal stenosis as described above.      There is no new intracranial large vessel occlusion. No aneurysm.      No acute intracranial hemorrhage.      CT-CEREBRAL PERFUSION ANALYSIS   Final Result      1.  Cerebral blood flow less than 30% likely representing completed infarct = 0 mL.      2.  T Max more than 6 seconds likely representing combination of completed infarct and ischemia = 0 mL.      3.  Mismatched volume likely representing ischemic brain/penumbra = None      4.  Please note that the cerebral perfusion was performed on the limited brain tissue around the basal ganglia region. Infarct/ischemia outside the CT perfusion sections can be missed in this study.      MR-BRAIN-W/O    (Results Pending)   MR-MRA HEAD-WITH    (Results Pending)   MR-MRA NECK-WITH    (Results Pending)       I personally reviewed the CBC and CMP    Assessment/Plan:  Justification for Admission Status  I anticipate this patient will require at least two midnights for appropriate medical management, necessitating inpatient admission because TIA in the setting of a L ICA dissection    Patient will need a Telemetry bed on NEUROLOGY service .  The need is secondary to above.    * Internal carotid artery dissection (HCC)- (present on admission)  Assessment & Plan  No interval change on CTA head/neck  TIA " has resolved in the RLE  Repeat MRI brain and add MRA head/neck  Continue eliquis 5 mg BID, ?Consider increasing to loading dose or switch to coumadin if MRI confirms true infarction  Balancing out bleeding risk  BP and labs are ok, normotension is ok at this time  Neuro following  Q4 hour neuro check  No need for stenting at this time  Monitor on tele    TIA (transient ischemic attack)- (present on admission)  Assessment & Plan  As noted above  Spontaneous in nature  Renal ART US duplex negative for FMD        VTE prophylaxis: therapeutic anticoagulation with eliquis 5 mg BID

## 2023-12-16 NOTE — ED NOTES
12 lead EKG completed by this RN; reviewed by MD Dasilva. Patient confirms with this RN; that right upper leg/buttock tingling/altered sensation started this am at 0845. This subsided and then returned at 0920 this am, lasting only 1-2 min per episode. Pt was diagnosed with carotid artery dissection 12/7. Pt is a&ox4, pupils equal, round and reactive. Face symmetrical. Pt notes diminished sensation to left face and mild left eye pain but this has been present since 12/7, pt states this has decreased in severity since onset. Pt has no drift, equal grasp and dorsiflexion bilaterally. Speech clear. All monitors in place. NSR on cardiac monitor with no ectopy. Call light in reach. Aunt at bedside. Pt denies any needs at this time.

## 2023-12-16 NOTE — ED NOTES
PIV placed; labs sent. Fsbs taken; 93. Pt to CT at this time. Pt a&o, resps even and unlabored, nadn.

## 2023-12-16 NOTE — CONSULTS
Neurology STROKE CODE H&P  Neurohospitalist Service, [facility] Neurosciences    Referring Physician: Elsi Dasilva M.D.    STROKE CODE:   Chief Complaint   Patient presents with    Tingling     Ambulates to triage reports tingling in right hip this morning and left arm onset at 0920. Was recently admitted for carotic artery dissection and got d/c thursday, was sent here by Neuro Surgeon for further evaluation due to new symptoms.       To obtain the most accurate data regarding the time called, and time patient seen, refer to the stroke run-sheet and chart.  For time of CT, refer to the radiology report. See A&P below for TPA Decision and door to needle time if and when applicable.    HPI: Elzbieta Rao is a 33 y.o. 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 The patient was initially placed on a heparin drip at that time and bridge to [facility] prior to discharge. MRI brain was negative for acute findings. Today she presents with return of paraesthesias, now isolated to the right lateral hip as well as left upper extremity. There is no focal motor weakness appreciated. Noncontrast CT head negative for acute findings. CTA head/neck do not demonstrate any interval changes over the previous imaging, confirms the presumed appearance of a short segment of left ICA petrous dissection as well as the previous identified severe stenosis. Neurology has been consulted for further evaluation of the above.     Review of systems: In addition to what is detailed in the HPI above, all other systems reviewed and are negative.    Past Medical History:    has a past medical history of Dissection of left carotid artery (HCC).    FHx:  family history includes Cancer in her paternal grandmother;  "Hypertension in her mother; No Known Problems in her brother, father, and sister; Stroke in her maternal grandfather.    SHx:   reports that she has never smoked. She has never used smokeless tobacco. She reports current alcohol use of about 1.2 oz of alcohol per week. She reports that she does not use drugs.    Allergies:  No Known Allergies    Medications:  No current facility-administered medications for this encounter.    Current Outpatient Medications:     apixaban (ELIQUIS) 5mg Tab, Take 1 Tablet by mouth 2 times a day. Indications: Thromboembolism secondary to Atrial Fibrillation, Disp: 60 Tablet, Rfl: 11    oxyCODONE immediate-release (ROXICODONE) 5 MG Tab, Take 1 Tablet by mouth every four hours as needed for Severe Pain for up to 7 days. Indications: Acute Pain, Disp: 30 Tablet, Rfl: 0    ondansetron (ZOFRAN ODT) 4 MG TABLET DISPERSIBLE, Take 1 Tablet by mouth every 6 hours as needed for Nausea/Vomiting., Disp: 10 Tablet, Rfl: 0    acetaminophen (TYLENOL) 500 MG Tab, Take 1,000 mg by mouth 2 times a day as needed for Moderate Pain., Disp: , Rfl:     ibuprofen (MOTRIN) 200 MG Tab, Take 600 mg by mouth every 6 hours as needed for Headache., Disp: , Rfl:     Physical Examination:    Vitals:    12/16/23 1011   BP: (!) 138/93   Pulse: (!) 111   Resp: 18   Temp: 36.1 °C (97 °F)   TempSrc: Temporal   SpO2: 98%   Weight: 65.6 kg (144 lb 10 oz)   Height: 1.727 m (5' 8\")         General: Patient is awake and in no acute distress  Eye: Examination of optic disks not indicated at this time given acuity of consult  Neck: There is normal range of motion  CV: Regular rate   Extremities:  Clear, dry, intact, without peripheral edema    NEUROLOGICAL EXAM:     Mental status: Awake, alert and fully oriented  Speech and language: Speech is clear and fluent. The patient is able to name and repeat, and follow commands  Cranial nerve exam: Pupils are equal, round and reactive to light bilaterally. Visual fields are full. There " is no nystagmus. Extraocular muscles are intact. Face is symmetric. Sensation in the face is intact to light touch. Palate elevates symmetrically. Tongue is midline.  Motor exam: There is sustained antigravity with no downward drift in bilateral arms and legs.  There is no pronator drift.  Tone is normal. No abnormal movements were seen on exam.  Sensory exam:  Diminished sensation in left arm, and lateral right thigh  Deep tendon reflexes:  2+ throughout. Toes down-going bilaterally.  Coordination: No ataxia on bilateral finger-to-nose testing.  Gait: Deferred due to patient preference.    NIHSS: National Institutes of Health Stroke Scale    [0] 1a:Level of Consciousness    0-alert 1-drowsy   2-stupor   3-coma  [0] 1b:LOC Questions                  0-both  1-one      2-neither  [0] 1c:LOC Commands                   0-both  1-one      2-neither  [0] 2: Best Gaze                     0-nl    1-partial  2-forced  [0] 3: Visual Fields                   0-nl    1-partial  2-complete 3-bilat  [0] 4: Facial Paresis                0-nl    1-minor    2-partial  3-full  MOTOR                       0-nl  [0] 5: Right Arm           1-drift  [0] 6: Left Arm             2-some effort vs gravity  [0] 7: Right Leg           3-no effort vs gravity  [0] 8: Left Leg             4-no movement                             x-untestable  [0] 9: Limb Ataxia                    0-abs   1-1_limb   2-2+_limbs       x-untestable  [1] 10:Sensory                        0-nl    1-partial  2-dense  [0] 11:Best Language/Aphasia         0-nl    1-mild/mod 2-severe   3-mute  [0] 12:Dysarthria                     0-nl    1-mild/mod 2-severe       x-untestable  [0] 13:Neglect/Inattention            0-none  1-partial  2-complete  [1] TOTAL    Baseline Modified Isabela Scale (MRS): 1 = No significant disability, despite symptoms; able to perform all usual duties and activities    Objective Data:    Labs:  Lab Results   Component Value Date/Time     PROTHROMBTM 13.7 12/12/2023 02:55 PM    INR 1.04 12/12/2023 02:55 PM      Lab Results   Component Value Date/Time    WBC 5.6 12/16/2023 10:55 AM    RBC 4.95 12/16/2023 10:55 AM    HEMOGLOBIN 15.8 12/16/2023 10:55 AM    HEMATOCRIT 46.1 12/16/2023 10:55 AM    MCV 93.1 12/16/2023 10:55 AM    MCH 31.9 12/16/2023 10:55 AM    MCHC 34.3 12/16/2023 10:55 AM    MPV 9.7 12/16/2023 10:55 AM    NEUTSPOLYS 60.30 12/16/2023 10:55 AM    LYMPHOCYTES 32.70 12/16/2023 10:55 AM    MONOCYTES 5.90 12/16/2023 10:55 AM    EOSINOPHILS 0.50 12/16/2023 10:55 AM    BASOPHILS 0.40 12/16/2023 10:55 AM      Lab Results   Component Value Date/Time    SODIUM 141 12/12/2023 09:11 AM    POTASSIUM 3.6 12/12/2023 09:11 AM    CHLORIDE 103 12/12/2023 09:11 AM    CO2 25 12/12/2023 09:11 AM    GLUCOSE 135 (H) 12/12/2023 09:11 AM    BUN 9 12/12/2023 09:11 AM    CREATININE 0.76 12/12/2023 09:11 AM      Lab Results   Component Value Date/Time    CHOLSTRLTOT 159 06/18/2019 07:54 AM    LDL 80 06/18/2019 07:54 AM    HDL 67 06/18/2019 07:54 AM    TRIGLYCERIDE 58 06/18/2019 07:54 AM       Lab Results   Component Value Date/Time    ALKPHOSPHAT 61 12/12/2023 09:11 AM    ASTSGOT 10 (L) 12/12/2023 09:11 AM    ALTSGPT 10 12/12/2023 09:11 AM    TBILIRUBIN 0.4 12/12/2023 09:11 AM        Imaging/Testing:    I interpreted and/or reviewed the patient's neuroimaging    CT-CTA HEAD WITH & W/O-POST PROCESS    (Results Pending)   CT-CTA NECK WITH & W/O-POST PROCESSING    (Results Pending)   CT-CEREBRAL PERFUSION ANALYSIS    (Results Pending)   MR-MRA HEAD-W/O    (Results Pending)   MR-MRA NECK-W/O    (Results Pending)       Assessment and Plan:    33 y.o. female recently discharged after presenting with paraesthesias and found to have a left ICA dissection on CTA head/neck. MRI brain without contrast was done on 12/12 and was negative for acute intracranial findings. The patient returned today with new paraesthesias in her left arm and right hip. Repeat CTA head/neck appear  stable and unchanged from the prior studies on her initial admission. We will obtain MRI brain without contrast as well as MRA head and neck with contrast and with fat suppression for further evaluation. Case was discussed with Neuro Interventional Radiology who does not believe an angiogram is needed at this time.     Problem list:  Left ICA dissection    Recommendations:  - q4h neuro exams  - Stat CT head if there is a decline in neuro status  - Blood pressure goal is normotension 100-130/60-80. Antihypertensives per primary team  - Obtain MRI brain without contrast  - Obtain MRA head and neck with contrast with fat suppression  - Continue Eliquis 5mg BID as initiated on prior admission    Case reviewed and plan created with Dr. Peter Hua, Vascular Neurology. Please call with any questions.      Bijan ODOM  Vascular Neurology, Acute Care Services

## 2023-12-17 VITALS
WEIGHT: 144.62 LBS | OXYGEN SATURATION: 99 % | HEIGHT: 68 IN | DIASTOLIC BLOOD PRESSURE: 67 MMHG | BODY MASS INDEX: 21.92 KG/M2 | SYSTOLIC BLOOD PRESSURE: 109 MMHG | TEMPERATURE: 98.4 F | RESPIRATION RATE: 16 BRPM | HEART RATE: 57 BPM

## 2023-12-17 PROBLEM — G45.9 TIA (TRANSIENT ISCHEMIC ATTACK): Status: RESOLVED | Noted: 2023-12-16 | Resolved: 2023-12-17

## 2023-12-17 LAB
ANION GAP SERPL CALC-SCNC: 9 MMOL/L (ref 7–16)
BASOPHILS # BLD AUTO: 0.4 % (ref 0–1.8)
BASOPHILS # BLD: 0.03 K/UL (ref 0–0.12)
BUN SERPL-MCNC: 15 MG/DL (ref 8–22)
CALCIUM SERPL-MCNC: 8.8 MG/DL (ref 8.5–10.5)
CHLORIDE SERPL-SCNC: 101 MMOL/L (ref 96–112)
CO2 SERPL-SCNC: 26 MMOL/L (ref 20–33)
CREAT SERPL-MCNC: 0.8 MG/DL (ref 0.5–1.4)
EOSINOPHIL # BLD AUTO: 0.08 K/UL (ref 0–0.51)
EOSINOPHIL NFR BLD: 1.1 % (ref 0–6.9)
ERYTHROCYTE [DISTWIDTH] IN BLOOD BY AUTOMATED COUNT: 42.7 FL (ref 35.9–50)
GFR SERPLBLD CREATININE-BSD FMLA CKD-EPI: 99 ML/MIN/1.73 M 2
GLUCOSE SERPL-MCNC: 98 MG/DL (ref 65–99)
HCT VFR BLD AUTO: 41.2 % (ref 37–47)
HGB BLD-MCNC: 14.1 G/DL (ref 12–16)
IMM GRANULOCYTES # BLD AUTO: 0.02 K/UL (ref 0–0.11)
IMM GRANULOCYTES NFR BLD AUTO: 0.3 % (ref 0–0.9)
LYMPHOCYTES # BLD AUTO: 2.54 K/UL (ref 1–4.8)
LYMPHOCYTES NFR BLD: 36.2 % (ref 22–41)
MCH RBC QN AUTO: 32.3 PG (ref 27–33)
MCHC RBC AUTO-ENTMCNC: 34.2 G/DL (ref 32.2–35.5)
MCV RBC AUTO: 94.5 FL (ref 81.4–97.8)
MONOCYTES # BLD AUTO: 0.5 K/UL (ref 0–0.85)
MONOCYTES NFR BLD AUTO: 7.1 % (ref 0–13.4)
NEUTROPHILS # BLD AUTO: 3.84 K/UL (ref 1.82–7.42)
NEUTROPHILS NFR BLD: 54.9 % (ref 44–72)
NRBC # BLD AUTO: 0 K/UL
NRBC BLD-RTO: 0 /100 WBC (ref 0–0.2)
PLATELET # BLD AUTO: 258 K/UL (ref 164–446)
PMV BLD AUTO: 9.9 FL (ref 9–12.9)
POTASSIUM SERPL-SCNC: 3.8 MMOL/L (ref 3.6–5.5)
RBC # BLD AUTO: 4.36 M/UL (ref 4.2–5.4)
SODIUM SERPL-SCNC: 136 MMOL/L (ref 135–145)
WBC # BLD AUTO: 7 K/UL (ref 4.8–10.8)

## 2023-12-17 PROCEDURE — 85025 COMPLETE CBC W/AUTO DIFF WBC: CPT

## 2023-12-17 PROCEDURE — A9270 NON-COVERED ITEM OR SERVICE: HCPCS | Performed by: INTERNAL MEDICINE

## 2023-12-17 PROCEDURE — 700102 HCHG RX REV CODE 250 W/ 637 OVERRIDE(OP): Performed by: INTERNAL MEDICINE

## 2023-12-17 PROCEDURE — 99239 HOSP IP/OBS DSCHRG MGMT >30: CPT | Performed by: STUDENT IN AN ORGANIZED HEALTH CARE EDUCATION/TRAINING PROGRAM

## 2023-12-17 PROCEDURE — 99232 SBSQ HOSP IP/OBS MODERATE 35: CPT | Performed by: NURSE PRACTITIONER

## 2023-12-17 PROCEDURE — 80048 BASIC METABOLIC PNL TOTAL CA: CPT

## 2023-12-17 PROCEDURE — 36415 COLL VENOUS BLD VENIPUNCTURE: CPT

## 2023-12-17 RX ADMIN — APIXABAN 5 MG: 5 TABLET, FILM COATED ORAL at 08:24

## 2023-12-17 ASSESSMENT — FIBROSIS 4 INDEX: FIB4 SCORE: 0.5

## 2023-12-17 NOTE — DISCHARGE INSTRUCTIONS
Discharge Instructions per Torin Presley M.D.    You were hospitalized for transient neurologic symptoms (TIA) due to known carotid artery dissection. MRI determined this was not a stroke. A neurologist determined you did not need any procedures or changes in medication.    DIET: Regular    ACTIVITY: Regular    DIAGNOSIS: Transient Ischemic Attack due to Carotid Artery Dissection    Return to ER if you faint for any reason, develop persistent neurologic symptoms, develop bleeding in your bowels, or develop sudden severe headache.

## 2023-12-17 NOTE — PROGRESS NOTES
Neurology Progress Note  Neurohospitalist Service, [facility] Neurosciences    Referring Physician: Elsi Dasilva M.D.    STROKE CODE:   Chief Complaint   Patient presents with    Tingling     Ambulates to triage reports tingling in right hip this morning and left arm onset at 0920. Was recently admitted for carotic artery dissection and got d/c thursday, was sent here by Neuro Surgeon for further evaluation due to new symptoms.       To obtain the most accurate data regarding the time called, and time patient seen, refer to the stroke run-sheet and chart.  For time of CT, refer to the radiology report. See A&P below for TPA Decision and door to needle time if and when applicable.    HPI: Elzbieta Rao is a 33 y.o. 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 The patient was initially placed on a heparin drip at that time and bridge to [facility] prior to discharge. MRI brain was negative for acute findings. Today she presents with return of paraesthesias, now isolated to the right lateral hip as well as left upper extremity. There is no focal motor weakness appreciated. Noncontrast CT head negative for acute findings. CTA head/neck do not demonstrate any interval changes over the previous imaging, confirms the presumed appearance of a short segment of left ICA petrous dissection as well as the previous identified severe stenosis. Neurology has been consulted for further evaluation of the above.     Interval update 12/17/2023: Neuro exam is stable, nonfocal at this time. MRI brain is negative for acute findings. MRA head and neck Redemonstrate the known left cervical ICA dissection without evidence of interval change.    Review of systems: In addition to what is detailed in the HPI  above, all other systems reviewed and are negative.    Past Medical History:    has a past medical history of Dissection of left carotid artery (HCC).    FHx:  family history includes Cancer in her paternal grandmother; Hypertension in her mother; No Known Problems in her brother, father, and sister; Stroke in her maternal grandfather.    SHx:   reports that she has never smoked. She has never used smokeless tobacco. She reports current alcohol use of about 1.2 oz of alcohol per week. She reports that she does not use drugs.    Allergies:  No Known Allergies    Medications:    Current Facility-Administered Medications:     acetaminophen (Tylenol) tablet 500-1,000 mg, 500-1,000 mg, Oral, Q6HRS PRN, José Miguel Ramos M.D.    apixaban (Eliquis) tablet 5 mg, 5 mg, Oral, BID, José Miguel Ramos M.D., 5 mg at 12/17/23 0824    senna-docusate (Pericolace Or Senokot S) 8.6-50 MG per tablet 2 Tablet, 2 Tablet, Oral, BID, 2 Tablet at 12/16/23 1952 **AND** polyethylene glycol/lytes (Miralax) Packet 1 Packet, 1 Packet, Oral, QDAY PRN **AND** magnesium hydroxide (Milk Of Magnesia) suspension 30 mL, 30 mL, Oral, QDAY PRN **AND** bisacodyl (Dulcolax) suppository 10 mg, 10 mg, Rectal, QDAY PRN, José Miguel Ramos M.D.    ondansetron (Zofran) syringe/vial injection 4 mg, 4 mg, Intravenous, Q4HRS PRN, José Miguel Ramos M.D.    ondansetron (Zofran ODT) dispertab 4 mg, 4 mg, Oral, Q4HRS PRN, José Miguel Ramos M.D., 4 mg at 12/16/23 1847    promethazine (Phenergan) tablet 12.5-25 mg, 12.5-25 mg, Oral, Q4HRS PRNJosé Miguel M.D.    promethazine (Phenergan) suppository 12.5-25 mg, 12.5-25 mg, Rectal, Q4HRS PRN, José Miguel Ramos M.D.    prochlorperazine (Compazine) injection 5-10 mg, 5-10 mg, Intravenous, Q4HRS PRN, José Miguel Ramos M.D.    Pharmacy Consult Request ...Pain Management Review 1 Each, 1 Each, Other, PHARMACY TO DOSE, José Miguel Ramos M.D.    oxyCODONE immediate-release (Roxicodone) tablet 2.5 mg, 2.5 mg, Oral, Q3HRS  PRN **OR** oxyCODONE immediate-release (Roxicodone) tablet 5 mg, 5 mg, Oral, Q3HRS PRN, 5 mg at 12/16/23 1952 **OR** HYDROmorphone (Dilaudid) injection 0.25 mg, 0.25 mg, Intravenous, Q3HRS PRN, José Miguel Ramos M.D.    Physical Examination:    Vitals:    12/17/23 0417 12/17/23 0423 12/17/23 0500 12/17/23 0800   BP: 109/67  109/67 109/67   Pulse: 65   (!) 57   Resp: 16   16   Temp: 37.1 °C (98.8 °F)   36.9 °C (98.4 °F)   TempSrc: Temporal   Temporal   SpO2: 98%   99%   Weight:  65.6 kg (144 lb 10 oz)     Height:             General: Patient is awake and in no acute distress  Eye: Examination of optic disks not indicated at this time given acuity of consult  Neck: There is normal range of motion  CV: Regular rate   Extremities:  Clear, dry, intact, without peripheral edema    NEUROLOGICAL EXAM:     Mental status: Awake, alert and fully oriented  Speech and language: Speech is clear and fluent. The patient is able to name and repeat, and follow commands  Cranial nerve exam: Pupils are equal, round and reactive to light bilaterally. Visual fields are full. There is no nystagmus. Extraocular muscles are intact. Face is symmetric. Sensation in the face is intact to light touch. Palate elevates symmetrically. Tongue is midline.  Motor exam: There is sustained antigravity with no downward drift in bilateral arms and legs.  There is no pronator drift.  Tone is normal. No abnormal movements were seen on exam.  Sensory exam:  Diminished sensation in left arm, and lateral right thigh  Deep tendon reflexes:  2+ throughout. Toes down-going bilaterally.  Coordination: No ataxia on bilateral finger-to-nose testing.  Gait: Deferred due to patient preference.    NIHSS: National Institutes of Health Stroke Scale    [0] 1a:Level of Consciousness    0-alert 1-drowsy   2-stupor   3-coma  [0] 1b:LOC Questions                  0-both  1-one      2-neither  [0] 1c:LOC Commands                   0-both  1-one      2-neither  [0] 2: Best  Gaze                     0-nl    1-partial  2-forced  [0] 3: Visual Fields                   0-nl    1-partial  2-complete 3-bilat  [0] 4: Facial Paresis                0-nl    1-minor    2-partial  3-full  MOTOR                       0-nl  [0] 5: Right Arm           1-drift  [0] 6: Left Arm             2-some effort vs gravity  [0] 7: Right Leg           3-no effort vs gravity  [0] 8: Left Leg             4-no movement                             x-untestable  [0] 9: Limb Ataxia                    0-abs   1-1_limb   2-2+_limbs       x-untestable  [1] 10:Sensory                        0-nl    1-partial  2-dense  [0] 11:Best Language/Aphasia         0-nl    1-mild/mod 2-severe   3-mute  [0] 12:Dysarthria                     0-nl    1-mild/mod 2-severe       x-untestable  [0] 13:Neglect/Inattention            0-none  1-partial  2-complete  [1] TOTAL    Baseline Modified Shree Scale (MRS): 1 = No significant disability, despite symptoms; able to perform all usual duties and activities    Objective Data:    Labs:  Lab Results   Component Value Date/Time    PROTHROMBTM 13.7 12/12/2023 02:55 PM    INR 1.04 12/12/2023 02:55 PM      Lab Results   Component Value Date/Time    WBC 7.0 12/17/2023 01:41 AM    RBC 4.36 12/17/2023 01:41 AM    HEMOGLOBIN 14.1 12/17/2023 01:41 AM    HEMATOCRIT 41.2 12/17/2023 01:41 AM    MCV 94.5 12/17/2023 01:41 AM    MCH 32.3 12/17/2023 01:41 AM    MCHC 34.2 12/17/2023 01:41 AM    MPV 9.9 12/17/2023 01:41 AM    NEUTSPOLYS 54.90 12/17/2023 01:41 AM    LYMPHOCYTES 36.20 12/17/2023 01:41 AM    MONOCYTES 7.10 12/17/2023 01:41 AM    EOSINOPHILS 1.10 12/17/2023 01:41 AM    BASOPHILS 0.40 12/17/2023 01:41 AM      Lab Results   Component Value Date/Time    SODIUM 136 12/17/2023 01:41 AM    POTASSIUM 3.8 12/17/2023 01:41 AM    CHLORIDE 101 12/17/2023 01:41 AM    CO2 26 12/17/2023 01:41 AM    GLUCOSE 98 12/17/2023 01:41 AM    BUN 15 12/17/2023 01:41 AM    CREATININE 0.80 12/17/2023 01:41 AM      Lab  Results   Component Value Date/Time    CHOLSTRLTOT 159 06/18/2019 07:54 AM    LDL 80 06/18/2019 07:54 AM    HDL 67 06/18/2019 07:54 AM    TRIGLYCERIDE 58 06/18/2019 07:54 AM       Lab Results   Component Value Date/Time    ALKPHOSPHAT 59 12/16/2023 10:55 AM    ASTSGOT 19 12/16/2023 10:55 AM    ALTSGPT 25 12/16/2023 10:55 AM    TBILIRUBIN 0.5 12/16/2023 10:55 AM        Imaging/Testing:    I interpreted and/or reviewed the patient's neuroimaging    MR-MRA NECK-WITH & W/O AND SEQUENCES   Final Result      No compelling evidence of interval change in the LEFT internal carotid artery dissection      MR-MRA HEAD-W/O   Final Result      Redemonstrated LEFT cervical ICA dissection without compelling evidence of interval. Findings are suggestive of slower flow in the imaged LEFT ICA as well as the LEFT MCA branches compared to the contralateral side.      MR-BRAIN-W/O   Final Result      1.  MRI of the brain without contrast within normal limits.   2.  Narrowing of the distal LEFT cervical and LEFT petrous-cavernous internal carotid artery redemonstrated, in keeping with the patient's known dissection      CT-CTA NECK WITH & W/O-POST PROCESSING   Final Result      1.  Again seen linear filling defects within the left distal cervical and carotid canal segments of the left internal carotid artery consistent with dissection. This is similar in appearance to prior exam. No evidence of complete occlusion.      2.  Normal variant diminutive left vertebral artery.      CT-CTA HEAD WITH & W/O-POST PROCESS   Final Result      No significant interval change in asymmetrically small distal cervical ICA and haleigh cavernous segments with presumed small short segment dissection and severe focal stenosis as described above.      There is no new intracranial large vessel occlusion. No aneurysm.      No acute intracranial hemorrhage.      CT-CEREBRAL PERFUSION ANALYSIS   Final Result      1.  Cerebral blood flow less than 30% likely  representing completed infarct = 0 mL.      2.  T Max more than 6 seconds likely representing combination of completed infarct and ischemia = 0 mL.      3.  Mismatched volume likely representing ischemic brain/penumbra = None      4.  Please note that the cerebral perfusion was performed on the limited brain tissue around the basal ganglia region. Infarct/ischemia outside the CT perfusion sections can be missed in this study.          Assessment and Plan:    33 y.o. female recently discharged after presenting with paraesthesias and found to have a left ICA dissection on CTA head/neck. MRI brain without contrast was done on 12/12 and was negative for acute intracranial findings. The patient returned today with new paraesthesias in her left arm and right hip. Repeat CTA head/neck appear stable and unchanged from the prior studies on her initial admission. Case was discussed with Neuro Interventional Radiology who does not believe an angiogram is needed at this time. MRI brain is negative for acute findings. MRA head and neck Redemonstrate the known left cervical ICA dissection without evidence of interval change. Recommend a CT chest, abdomen and pelvis which can be completed as an outpatient      Problem list:  Left ICA dissection    Recommendations:  - q4h neuro exams  - Stat CT head if there is a decline in neuro status  - Blood pressure goal is normotension 100-130/60-80. Antihypertensives per primary team. Imperative to avoid hypotension.  - Continue Eliquis 5mg BID as initiated on prior admission  - Can consider CT chest, abdomen, and pelvis as an outpatient to evaluate for FMD    Case reviewed and plan created with Dr. Nunez, Vascular Neurology. Neurology will sign off at this time.  Please call with any questions.      Bijan ODOM  Vascular Neurology, Acute Care Services

## 2023-12-17 NOTE — DISCHARGE SUMMARY
Discharge Summary    CHIEF COMPLAINT ON ADMISSION  Chief Complaint   Patient presents with    Tingling     Ambulates to triage reports tingling in right hip this morning and left arm onset at 0920. Was recently admitted for carotic artery dissection and got d/c thursday, was sent here by Neuro Surgeon for further evaluation due to new symptoms.       Reason for Admission  TIA    Admission Date  12/16/2023    CODE STATUS  Full Code    HPI & HOSPITAL COURSE  This is a 33 y.o. female with Left ICA dissection here with Right thigh and torso paresthesias.    She was recently hospitalized at [facility] 12/12-12/14 where she was diagnosed with a spontaneous Left ICA dissection causing RUE paresthesias.She was initiated on apixaban and referred to outpatient vascular medicine.    She developed transient paresthesias of the Right thigh and torso for approximately 1-3 minutes. Her prior headache has improved. She presented to the ED due to concern for possible CVA. CTA H&N was negative for interval change. Neurology was consulted and recommended MRI brain and MRA, for which there were no infarcts and slightly decreased Left ICA flow compared to Right ICA. In absence of infarct she was diagnosed with TIA from known ICA dissection. She was counseled at bedside by Neurology and Hospitalist on transient versus sustained neurologic deficits for which she should seek ED treatment for further evaluation.    Therefore, she is discharged in fair and stable condition to home with close outpatient follow-up.    The patient met 2-midnight criteria for an inpatient stay at the time of discharge.    Discharge Date  12/17/2023    FOLLOW UP ITEMS POST DISCHARGE    Left ICA dissection - follow up with vascular medicine for further evaluation    DISCHARGE DIAGNOSES  Principal Problem:    Internal carotid artery dissection (HCC) (POA: Yes)  Resolved Problems:    TIA (transient ischemic attack) (POA: Yes)      FOLLOW UP  Future Appointments   Date  Time Provider Department Center   12/18/2023  1:40 PM James Grant M.D. [facility] None   1/3/2024  9:30 AM [facility] [address] [place] [address] NEUROLOGY  [address] [facility] [address]  [place] [id number]  [phone]  Schedule an appointment as soon as possible for a visit  [facility] follow-up      MEDICATIONS ON DISCHARGE     Medication List        CONTINUE taking these medications        Instructions   acetaminophen 500 MG Tabs  Commonly known as: Tylenol   Take 500-1,000 mg by mouth every 6 hours as needed for Moderate Pain.  Dose: 500-1,000 mg     Eliquis 5mg Tabs  Generic drug: apixaban   Take 1 Tablet by mouth 2 times a day. Indications: Thromboembolism secondary to Atrial Fibrillation  Dose: 5 mg     ondansetron 4 MG Tbdp  Commonly known as: Zofran ODT   Take 1 Tablet by mouth every 6 hours as needed for Nausea/Vomiting.  Dose: 4 mg     oxyCODONE immediate-release 5 MG Tabs  Commonly known as: Roxicodone   Take 1 Tablet by mouth every four hours as needed for Severe Pain for up to 7 days. Indications: Acute Pain  Dose: 5 mg            STOP taking these medications      ibuprofen 200 MG Tabs  Commonly known as: Motrin              Allergies  No Known Allergies    DIET  Orders Placed This Encounter   Procedures    Diet Order Diet: Regular     Standing Status:   Standing     Number of Occurrences:   1     Order Specific Question:   Diet:     Answer:   Regular [1]       ACTIVITY  As tolerated.  Weight bearing as tolerated    CONSULTATIONS  Neurology    PROCEDURES  None    LABORATORY  Lab Results   Component Value Date    SODIUM 136 12/17/2023    POTASSIUM 3.8 12/17/2023    CHLORIDE 101 12/17/2023    CO2 26 12/17/2023    GLUCOSE 98 12/17/2023    BUN 15 12/17/2023    CREATININE 0.80 12/17/2023        Lab Results   Component Value Date    WBC 7.0 12/17/2023    HEMOGLOBIN 14.1 12/17/2023    HEMATOCRIT 41.2 12/17/2023    PLATELETCT 258 12/17/2023        Total time of the discharge process exceeds 36  minutes.

## 2023-12-18 ENCOUNTER — OFFICE VISIT (OUTPATIENT)
Dept: VASCULAR LAB | Facility: MEDICAL CENTER | Age: 33
End: 2023-12-18
Attending: FAMILY MEDICINE
Payer: COMMERCIAL

## 2023-12-18 VITALS
HEIGHT: 67 IN | SYSTOLIC BLOOD PRESSURE: 113 MMHG | HEART RATE: 84 BPM | BODY MASS INDEX: 22.91 KG/M2 | DIASTOLIC BLOOD PRESSURE: 76 MMHG | WEIGHT: 146 LBS

## 2023-12-18 DIAGNOSIS — I77.71 DISSECTION OF LEFT CAROTID ARTERY (HCC): ICD-10-CM

## 2023-12-18 DIAGNOSIS — I65.22 LEFT CAROTID STENOSIS: ICD-10-CM

## 2023-12-18 DIAGNOSIS — G45.9 TIA (TRANSIENT ISCHEMIC ATTACK): ICD-10-CM

## 2023-12-18 PROCEDURE — 99212 OFFICE O/P EST SF 10 MIN: CPT

## 2023-12-18 PROCEDURE — 99204 OFFICE O/P NEW MOD 45 MIN: CPT | Performed by: FAMILY MEDICINE

## 2023-12-18 PROCEDURE — 3074F SYST BP LT 130 MM HG: CPT | Performed by: FAMILY MEDICINE

## 2023-12-18 PROCEDURE — 3078F DIAST BP <80 MM HG: CPT | Performed by: FAMILY MEDICINE

## 2023-12-18 ASSESSMENT — FIBROSIS 4 INDEX: FIB4 SCORE: 0.5

## 2023-12-18 ASSESSMENT — ENCOUNTER SYMPTOMS
WHEEZING: 0
CLAUDICATION: 0
PALPITATIONS: 0
COUGH: 0
SPUTUM PRODUCTION: 0
ORTHOPNEA: 0
CHILLS: 0
FEVER: 0
HEMOPTYSIS: 0
PND: 0
SHORTNESS OF BREATH: 0

## 2023-12-18 NOTE — PROGRESS NOTES
INITIAL VASCULAR MEDICINE VISIT  12/18/23     Elzbieta Rao is a 33 y.o. female  who presents for initial  initially referred by Lionel Angela M.D. for eval and med mgmt of carotid artery  dissection,   est 12/2023     Subjective         L carotid artery dissection   Initial visit hx:  admit 12/12/23 after several day(started on 12/8/23)  hx of worsening HA with left-sided headache, left cheek numbness, and right upper extremity numbness. , seen in ED and had CTA neck showing L ICA acute dissection. No recent MVA, chiropracting, heavy lifting.  No fhx of arterial dissections or connective tissue d/o.  MR brain neg for CVA.  D/c home 12/14/23.  Returned to [facility] with transient tingling and number of the R proximal thigh and R trunk, lasting 1-3min.  No facial or tongue sx.  Repeat CTA head/neck showed no new findings other than stable L ICA dissectcion.    Prior hx of CVA/TIA or symptoms: as noted, however no prior event s  Prior surgical interventions: none   No hx of recent MVA, chiropracting or high velocity injuries.    No fhx of connective tissue d/o or known vasculitides reported   No hx of heart conditions, tachyarrhythmias     HTN: no prior dx or meds,  does not check home BP     Hyperlipidemia: no prior dx or meds     Antiplatelet/anticoagulation: Yes, Details: eliquis 5mg BID , no hx of bleeding       Patient Active Problem List   Diagnosis    Subacute vaginitis    Recurrent cold sores    Irregular intermenstrual bleeding    Endometrial polyp    Inflamed external hemorrhoid    Vulvar pain    Vulvar itching    Counseling for sexually transmitted disease    Yeast infection    BV (bacterial vaginosis)    Carotid artery dissection (HCC)    Internal carotid artery dissection (HCC)      Past Surgical History:   Procedure Laterality Date    OTHER  02/2020    left knee surgery ACL replaced     REPAIR, KNEE, ACL Left 2020    ABDOMINAL EXPLORATION  2010    endometrial polyp removal     OTHER      uterine  "polyp removed at age 19        Current Outpatient Medications:     apixaban, 5 mg, Oral, BID, Taking    oxyCODONE immediate-release, 5 mg, Oral, Q4HRS PRN, Taking    ondansetron, 4 mg, Oral, Q6HRS PRN, Taking    acetaminophen, 500-1,000 mg, Oral, Q6HRS PRN, Taking     No Known Allergies    Family History   Problem Relation Age of Onset    Hypertension Mother         heart murmur    Atrial fibrillation Mother         DOAC    Angina Mother     No Known Problems Father     No Known Problems Sister     No Known Problems Brother     Stroke Maternal Grandfather     Cancer Paternal Grandmother       Social History     Tobacco Use    Smoking status: Never    Smokeless tobacco: Never   Vaping Use    Vaping Use: Never used   Substance Use Topics    Alcohol use: Yes     Alcohol/week: 1.2 oz     Types: 2 Standard drinks or equivalent per week     Comment: social     Drug use: No     Review of Systems   Constitutional:  Negative for chills, fever and malaise/fatigue.   Eyes:  Negative for blurred vision, double vision, photophobia and pain.   Respiratory:  Negative for cough, hemoptysis, sputum production, shortness of breath and wheezing.    Cardiovascular:  Negative for chest pain, palpitations, orthopnea, claudication, leg swelling and PND.   Musculoskeletal:  Negative for myalgias and neck pain.   Neurological:  Positive for tingling (bilat distal fingers and toes, new over last several days). Negative for dizziness, tremors, focal weakness, seizures, weakness and headaches.   Psychiatric/Behavioral:  The patient is nervous/anxious. The patient does not have insomnia.            Objective   Vitals:    12/18/23 1349   BP: 113/76   BP Location: Left arm   Patient Position: Sitting   BP Cuff Size: Adult   Pulse: 84   Weight: 66.2 kg (146 lb)   Height: 1.702 m (5' 7\")      BP Readings from Last 5 Encounters:   12/18/23 113/76   12/17/23 109/67   12/14/23 126/82   01/07/23 127/80   06/30/22 129/80      Body mass index is 22.87 " "kg/m².  Wt Readings from Last 3 Encounters:   12/18/23 66.2 kg (146 lb)   12/17/23 65.6 kg (144 lb 10 oz)   12/12/23 65 kg (143 lb 4.8 oz)      Physical Exam  Vitals reviewed.   Constitutional:       General: She is not in acute distress.     Appearance: Normal appearance.   HENT:      Head: Normocephalic and atraumatic.   Neck:      Thyroid: No thyroid mass.      Vascular: Normal carotid pulses.      Trachea: Trachea normal.   Cardiovascular:      Rate and Rhythm: Normal rate and regular rhythm.      Chest Wall: PMI is not displaced.      Pulses: Normal pulses.           Carotid pulses are 2+ on the right side and 2+ on the left side.       Radial pulses are 2+ on the right side and 2+ on the left side.        Dorsalis pedis pulses are 2+ on the right side and 2+ on the left side.        Posterior tibial pulses are 2+ on the right side and 2+ on the left side.      Heart sounds: Normal heart sounds.      Comments: No carotid bruits noted   Pulmonary:      Effort: Pulmonary effort is normal.      Breath sounds: Normal breath sounds.   Musculoskeletal:      Cervical back: Full passive range of motion without pain.      Right lower leg: No edema.      Left lower leg: No edema.   Skin:     General: Skin is warm and dry.      Capillary Refill: Capillary refill takes less than 2 seconds.      Coloration: Skin is not cyanotic.      Nails: There is no clubbing.   Neurological:      General: No focal deficit present.      Mental Status: She is alert and oriented to person, place, and time. Mental status is at baseline.      Cranial Nerves: No cranial nerve deficit.      Coordination: Coordination is intact. Coordination normal.      Gait: Gait is intact. Gait normal.   Psychiatric:         Mood and Affect: Mood normal.         Behavior: Behavior normal.           No results found for: \"LIPOPROTA\"   No results found for: \"APOB\"    Lab Results   Component Value Date    PROTHROMBTM 13.7 12/12/2023    INR 1.04 12/12/2023       " "  Lab Results   Component Value Date    SODIUM 136 2023    POTASSIUM 3.8 2023    CHLORIDE 101 2023    CO2 26 2023    GLUCOSE 98 2023    BUN 15 2023    CREATININE 0.80 2023        Lab Results   Component Value Date    WBC 7.0 2023    RBC 4.36 2023    HEMOGLOBIN 14.1 2023    HEMATOCRIT 41.2 2023    MCV 94.5 2023    MCH 32.3 2023    MCHC 34.2 2023    MPV 9.9 2023         No results found for: \"MICROALBCALC\", \"MALBCRT\", \"MALBEXCR\", \"NQBKHZ09\", \"MICROALBUR\", \"MICRALB\", \"UMICROALBUM\", \"MICROALBTIM\"     VASCULAR IMAGING:  Results for orders placed or performed during the hospital encounter of 23   EKG   Result Value Ref Range    Report       [facility] Emergency Dept.    Test Date:  2023  Pt Name:    DE LESTER              Department: ER  MRN:        [id number]                      Room:        22  Gender:     Female                       Technician:  :        [id number]                   Requested By:YONY ABURTO  Order #:    [id number]                    Reading MD:    Measurements  Intervals                                Axis  Rate:       90                           P:          63  WA:         141                          QRS:        50  QRSD:       84                           T:          36  QT:         362  QTc:        443    Interpretive Statements  Sinus rhythm  No previous ECG available for comparison       UMA duplex 2023    1.  No sonographic evidence of renal artery stenosis or fibromuscular    dysplasia.    CTA head 23  The distal cervical ICA and haleigh cavernous segments are asymmetrically small compared to the right. Just proximal to the skull base there is a focal partially linear hypodensity in filling defect with associated luminal narrowing suspicious for dissection. In the proximal petrous portion in the proximal carotid canal there likely is a severe high-grade " focal stenosis also present.  CT angiogram of the [place] within normal limits.   No acute intracranial abnormality.    CTA neck 12/12/23  Flap-like filling defect in the left internal carotid artery petrous segment suspicious for dissection. Findings better seen and evaluated on the dedicated CTA of the head, report to follow.     CTA neck/head 12/16/23  No significant interval change in asymmetrically small distal cervical ICA and haleigh cavernous segments with presumed small short segment dissection and severe focal stenosis as described above.   There is no new intracranial large vessel occlusion. No aneurysm.   No acute intracranial hemorrhage.    MR brain 12/16/23  1.  MRI of the brain without contrast within normal limits.  2.  Narrowing of the distal LEFT cervical and LEFT petrous-cavernous internal carotid artery redemonstrated, in keeping with the patient's known dissection    MRA head 12/16/23  Redemonstrated LEFT cervical ICA dissection without compelling evidence of interval. Findings are suggestive of slower flow in the imaged LEFT ICA as well as the LEFT MCA branches compared to the contralateral side.          Medical Decision Making:  Today's Assessment / Status / Plan:     1. Dissection of left carotid artery (HCC)  Comp Metabolic Panel    Lipoprotein (a)    Lipid Profile    CT-CTA CHEST WITH & W/O-POST PROCESS    CTA ABDOMEN PELVIS W & W/O POST PROCESS    Referral to Neurology    CRP QUANTITIVE (NON-CARDIAC)    MPO/OR-3 (ANCA) ABS      2. Left carotid stenosis        3. TIA (transient ischemic attack)  Comp Metabolic Panel    Lipoprotein (a)    Lipid Profile    CT-CTA CHEST WITH & W/O-POST PROCESS    CTA ABDOMEN PELVIS W & W/O POST PROCESS    Referral to Neurology    CRP QUANTITIVE (NON-CARDIAC)    MPO/OR-3 (ANCA) ABS        Patient Type: Primary Prevention    Etiology of Established CVD if Present:     1) acute, extracranial, spontaneous L internal carotid artery dissection - associated  with TIA 12/2023   - Has associated proximal petrous segment high-grade stenosis, presumed from false luminal compression of true lumen distal to dissection flap   - No indications of completed cerebral infarction per MRI/MRA   - limited evidence to guide choice between OAC vs antiplat tx, however extracranial dissections are generally tx with OAC x 6mo whether VKA vs DOAC, as have lower risk for SAH development due to extracranial nature   - in light of female, early adulthood spontaneous the ddx would include idiopathic, trauma-induced, vasculitis, FMD, connective tissue d/o, vs other   - pt reports no prior trauma to incident though has practiced moderate intensity yoga over last 1yr - no major head stands  - denies chiropracting, MVA or other neck trauma preceding incident   Plan:  - see antithrombotic tx plan below  - monitor for new focal neuro s/s   - monitor BP and other risk factors   - update CTA chest/abd/pelvis to eval for other dissections, aneurysmal disease or vasculitic findings   - plan for update CTA head/neck at 6mo (6/2024) for eval of dissection healing, recannalization, and next steps in ongoing tx and surveillance     2) TIA - resolved, no residual sx - as reviewed with pt bilat distal finger/toe tingling would not be a classic feature of L carotid dissection, however, I definitely would like to get neurology involved for stroke bridge   ABCD2 score = 1 pt (low)  Per the validation study, 0-3 points: Low Risk  2-Day Stroke Risk: 1.0%  7-Day Stroke Risk: 1.2%  90-Day Stroke Risk: 3.1%  Plan  - continue tx plan as noted   - ref to neurology for co-mgmt   - seek prompt attention or call 911 if acute, focal neuro s/s     BLOOD PRESSURE CONTROL   ACC/AHA (2017) goal <130/80  Home BP at goal: yes  Office BP at goal:  yes   Plan:   - continue healthy diet, activity, weight mgmt   Monitoring:   - routine clinic-based BP measurements at least once annually   Medications: no meds indicated at this time       LIPID MANAGEMENT:   Qualifies for Statin Therapy Based on 2018 ACC/AHA Guidelines: no  The ASCVD Risk score (Abdoul DK, et al., 2019) failed to calculate.  Major ASCVD events: None  High-risk conditions: None   Risk-enhancers: N/A  Currently on Statin: No  Tx goals: LDL-C <100 (consider non-HDL-C <130, apoB <90)  At goal? N/A  Plan:   - reinforced ongoing TLC measures as noted   - defer lab surveillance to PCP   Meds: could consider mod intensity statin to reduce vascular inflammation     ANTITHROMBOTIC/ANTIPLATELET THERAPY:   - as reviewed with pt, generally extracranial ICA dissection is treated with OAC however antiplat is acceptable alternative  - for now, continue eliquis 5mg BID (as recommended by vascular neurologist) x 6mo (6/2024), then we will plan to transition to plavix indefinitely if CTA neck shows stable healing     GLYCEMIC STATUS: Normal    LIFESTYLE INTERVENTIONS:    SMOKING:     reports that she has never smoked. She has never used smokeless tobacco.   - continued complete avoidance of all tobacco products     PHYSICAL ACTIVITY: continue healthy activity to improve CV fitness.  In general, targeting >150min/week of moderate-level activity or as much as tolerated in light of functional status and co-morbidities     WEIGHT MANAGEMENT AND NUTRITION: Mediterranean style dietary approach      OTHER: none     Instructed to follow-up with PCP for remainder of adult medical needs: yes  We will partner with other providers in the management of established vascular disease and cardiometabolic risk factors.    Studies to Be Obtained: CTA chest/abd/pelvis - ordered for ASAP,  CTA head/neck - 6/2024 - RN to track    Labs to Be Obtained: as noted above     Follow up in: 2 - 3 weeks    James Grant M.D.  [facility]   [facility]   [phone]

## 2023-12-19 ASSESSMENT — ENCOUNTER SYMPTOMS
TINGLING: 1
DOUBLE VISION: 0
TREMORS: 0
BLURRED VISION: 0
EYE PAIN: 0
SEIZURES: 0
PHOTOPHOBIA: 0
DIZZINESS: 0
NERVOUS/ANXIOUS: 1
INSOMNIA: 0
NECK PAIN: 0
WEAKNESS: 0
FOCAL WEAKNESS: 0
MYALGIAS: 0
HEADACHES: 0

## 2023-12-20 ENCOUNTER — HOSPITAL ENCOUNTER (OUTPATIENT)
Dept: RADIOLOGY | Facility: MEDICAL CENTER | Age: 33
End: 2023-12-20
Attending: FAMILY MEDICINE
Payer: COMMERCIAL

## 2023-12-20 ENCOUNTER — OFFICE VISIT (OUTPATIENT)
Dept: MEDICAL GROUP | Facility: PHYSICIAN GROUP | Age: 33
End: 2023-12-20
Payer: COMMERCIAL

## 2023-12-20 VITALS
WEIGHT: 149.9 LBS | HEART RATE: 79 BPM | TEMPERATURE: 99.6 F | OXYGEN SATURATION: 99 % | BODY MASS INDEX: 23.53 KG/M2 | SYSTOLIC BLOOD PRESSURE: 112 MMHG | DIASTOLIC BLOOD PRESSURE: 78 MMHG | HEIGHT: 67 IN | RESPIRATION RATE: 17 BRPM

## 2023-12-20 DIAGNOSIS — I77.71 DISSECTION OF LEFT CAROTID ARTERY (HCC): ICD-10-CM

## 2023-12-20 DIAGNOSIS — G45.9 TIA (TRANSIENT ISCHEMIC ATTACK): ICD-10-CM

## 2023-12-20 DIAGNOSIS — I77.71 INTERNAL CAROTID ARTERY DISSECTION (HCC): ICD-10-CM

## 2023-12-20 PROCEDURE — 3074F SYST BP LT 130 MM HG: CPT

## 2023-12-20 PROCEDURE — 99214 OFFICE O/P EST MOD 30 MIN: CPT

## 2023-12-20 PROCEDURE — 71275 CT ANGIOGRAPHY CHEST: CPT

## 2023-12-20 PROCEDURE — 700117 HCHG RX CONTRAST REV CODE 255: Performed by: FAMILY MEDICINE

## 2023-12-20 PROCEDURE — 3078F DIAST BP <80 MM HG: CPT

## 2023-12-20 RX ADMIN — IOHEXOL 100 ML: 350 INJECTION, SOLUTION INTRAVENOUS at 14:35

## 2023-12-20 ASSESSMENT — FIBROSIS 4 INDEX: FIB4 SCORE: 0.5

## 2023-12-20 NOTE — PROGRESS NOTES
"CC:   Chief Complaint   Patient presents with    Establish Care        HISTORY OF PRESENT ILLNESS: Patient is a 33 y.o. female established patient who presents today to discuss the following problems below:     Internal carotid artery dissection (HCC)  Started with a headache on the left side that was persistent, didn't resolve with ibuprofen. Had some visual changes for a few days, worked a nightshift, when she got off work she had numbness or tingling over the right hand and arm and right side of the tongue. Was on a heparin gtt and eliquis, had a repeat episode of numbness and tingling in the right leg. Patient saw Dr. Grant, going to have labs to look for coagulation defects, having CT scan done. Eliquis for 3-6 months and then transition to plavix or aspirin.     No antecedant event, no traumas, has been doing yoga.     Pain has been improving, has used oxycodone 2-3 times over the last few days.     Review of Systems: Otherwise negative except for as stated above.      Exam: /78   Pulse 79   Temp 37.6 °C (99.6 °F) (Temporal)   Resp 17   Ht 1.702 m (5' 7\")   Wt 68 kg (149 lb 14.4 oz)   SpO2 99%  Body mass index is 23.48 kg/m².    Physical Exam  Constitutional:       Appearance: Normal appearance.   Eyes:      Extraocular Movements: Extraocular movements intact.   Pulmonary:      Effort: Pulmonary effort is normal.   Musculoskeletal:      Cervical back: Normal range of motion.   Neurological:      General: No focal deficit present.      Mental Status: She is alert and oriented to person, place, and time.   Psychiatric:         Mood and Affect: Mood normal.         Behavior: Behavior normal.       Reviewed labs from inpatient care, CT scan, hospitalization notes, and consults from Dr. Grant.     Assessment/Plan:  33 y.o. female with the following -    1. Internal carotid artery dissection (HCC)  Acute problem. Established with vascular and pending further imaging and lab evaluation, follow up " appt scheduled. Patient will send in fmla/disability paperwork to remain off of work for the next 4 weeks due to the nature of her diagnosis. Continue oxycodone 5mg every 4-6 hours prn. Pain well managed at this time.       Follow-up: Return if symptoms worsen or fail to improve.    Health Maintenance: Completed      Please note that this dictation was created using voice recognition software. I have made every reasonable attempt to correct obvious errors, but I expect that there are errors of grammar and possibly content that I did not discover before finalizing the note.    Electronically signed by ELÍAS Patterson on December 20, 2023

## 2023-12-20 NOTE — ASSESSMENT & PLAN NOTE
Started with a headache on the left side that was persistent, didn't resolve with ibuprofen. Had some visual changes for a few days, worked a nightshift, when she got off work she had numbness or tingling over the right hand and arm and right side of the tongue. Was on a heparin gtt and eliquis, had a repeat episode of numbness and tingling in the right leg. Patient saw Dr. Grant, going to have labs to look for coagulation defects, having CT scan done. Eliquis for 3-6 months and then transition to plavix or aspirin.     No antecedant event, no traumas, has been doing yoga.     Pain has been improving, has used oxycodone 2-3 times over the last few days.

## 2023-12-22 ENCOUNTER — HOSPITAL ENCOUNTER (OUTPATIENT)
Dept: LAB | Facility: MEDICAL CENTER | Age: 33
End: 2023-12-22
Attending: FAMILY MEDICINE
Payer: COMMERCIAL

## 2023-12-22 DIAGNOSIS — I77.71 DISSECTION OF LEFT CAROTID ARTERY (HCC): ICD-10-CM

## 2023-12-22 DIAGNOSIS — G45.9 TIA (TRANSIENT ISCHEMIC ATTACK): ICD-10-CM

## 2023-12-22 LAB
ALBUMIN SERPL BCP-MCNC: 5.1 G/DL (ref 3.2–4.9)
ALBUMIN/GLOB SERPL: 2.6 G/DL
ALP SERPL-CCNC: 51 U/L (ref 30–99)
ALT SERPL-CCNC: 11 U/L (ref 2–50)
ANION GAP SERPL CALC-SCNC: 10 MMOL/L (ref 7–16)
AST SERPL-CCNC: 13 U/L (ref 12–45)
BILIRUB SERPL-MCNC: 0.4 MG/DL (ref 0.1–1.5)
BUN SERPL-MCNC: 12 MG/DL (ref 8–22)
CALCIUM ALBUM COR SERPL-MCNC: 8.8 MG/DL (ref 8.5–10.5)
CALCIUM SERPL-MCNC: 9.7 MG/DL (ref 8.5–10.5)
CHLORIDE SERPL-SCNC: 101 MMOL/L (ref 96–112)
CHOLEST SERPL-MCNC: 194 MG/DL (ref 100–199)
CO2 SERPL-SCNC: 27 MMOL/L (ref 20–33)
CREAT SERPL-MCNC: 0.75 MG/DL (ref 0.5–1.4)
CRP SERPL HS-MCNC: <0.3 MG/DL (ref 0–0.75)
FASTING STATUS PATIENT QL REPORTED: NORMAL
GFR SERPLBLD CREATININE-BSD FMLA CKD-EPI: 107 ML/MIN/1.73 M 2
GLOBULIN SER CALC-MCNC: 2 G/DL (ref 1.9–3.5)
GLUCOSE SERPL-MCNC: 83 MG/DL (ref 65–99)
HDLC SERPL-MCNC: 78 MG/DL
LDLC SERPL CALC-MCNC: 107 MG/DL
POTASSIUM SERPL-SCNC: 4.6 MMOL/L (ref 3.6–5.5)
PROT SERPL-MCNC: 7.1 G/DL (ref 6–8.2)
SODIUM SERPL-SCNC: 138 MMOL/L (ref 135–145)
TRIGL SERPL-MCNC: 43 MG/DL (ref 0–149)

## 2023-12-22 PROCEDURE — 80061 LIPID PANEL: CPT

## 2023-12-22 PROCEDURE — 36415 COLL VENOUS BLD VENIPUNCTURE: CPT

## 2023-12-22 PROCEDURE — 83695 ASSAY OF LIPOPROTEIN(A): CPT

## 2023-12-22 PROCEDURE — 80053 COMPREHEN METABOLIC PANEL: CPT

## 2023-12-22 PROCEDURE — 83516 IMMUNOASSAY NONANTIBODY: CPT

## 2023-12-22 PROCEDURE — 86140 C-REACTIVE PROTEIN: CPT

## 2023-12-24 LAB — LPA SERPL-MCNC: <6 MG/DL

## 2023-12-25 LAB
MYELOPEROXIDASE AB SER-ACNC: 0 AU/ML (ref 0–19)
PROTEINASE3 AB SER-ACNC: 0 AU/ML (ref 0–19)

## 2023-12-26 ENCOUNTER — HOSPITAL ENCOUNTER (OUTPATIENT)
Dept: RADIOLOGY | Facility: MEDICAL CENTER | Age: 33
End: 2023-12-26
Attending: NURSE PRACTITIONER
Payer: COMMERCIAL

## 2023-12-26 DIAGNOSIS — R22.32 MASS OF LEFT AXILLA: ICD-10-CM

## 2023-12-26 PROCEDURE — G0279 TOMOSYNTHESIS, MAMMO: HCPCS

## 2023-12-26 PROCEDURE — 76642 ULTRASOUND BREAST LIMITED: CPT | Mod: LT

## 2023-12-27 ENCOUNTER — TELEPHONE (OUTPATIENT)
Dept: SCHEDULING | Facility: IMAGING CENTER | Age: 33
End: 2023-12-27

## 2023-12-27 NOTE — TELEPHONE ENCOUNTER
Florentino    Caller: Elzbieta Rao     Topic/issue: Inquiring on her short disability paperwork. She states it should have been faxed in but if you did not receive it, she will bring in the copy.     Callback Number: [phone] (home)       Thank You    Mary GAO

## 2024-01-03 ENCOUNTER — OFFICE VISIT (OUTPATIENT)
Dept: VASCULAR LAB | Facility: MEDICAL CENTER | Age: 34
End: 2024-01-03
Attending: FAMILY MEDICINE
Payer: COMMERCIAL

## 2024-01-03 VITALS
SYSTOLIC BLOOD PRESSURE: 115 MMHG | BODY MASS INDEX: 22.76 KG/M2 | HEIGHT: 67 IN | HEART RATE: 79 BPM | DIASTOLIC BLOOD PRESSURE: 78 MMHG | WEIGHT: 145 LBS

## 2024-01-03 DIAGNOSIS — Z79.01 CHRONIC ANTICOAGULATION: ICD-10-CM

## 2024-01-03 DIAGNOSIS — I77.71 DISSECTION OF LEFT CAROTID ARTERY (HCC): Chronic | ICD-10-CM

## 2024-01-03 DIAGNOSIS — R20.2 TINGLING IN EXTREMITIES: ICD-10-CM

## 2024-01-03 DIAGNOSIS — E78.49 OTHER HYPERLIPIDEMIA: ICD-10-CM

## 2024-01-03 DIAGNOSIS — I65.22 LEFT CAROTID STENOSIS: ICD-10-CM

## 2024-01-03 DIAGNOSIS — Z86.73 HISTORY OF TRANSIENT ISCHEMIC ATTACK (TIA): ICD-10-CM

## 2024-01-03 PROCEDURE — 3074F SYST BP LT 130 MM HG: CPT | Performed by: FAMILY MEDICINE

## 2024-01-03 PROCEDURE — 3078F DIAST BP <80 MM HG: CPT | Performed by: FAMILY MEDICINE

## 2024-01-03 PROCEDURE — 99214 OFFICE O/P EST MOD 30 MIN: CPT | Performed by: FAMILY MEDICINE

## 2024-01-03 PROCEDURE — 99212 OFFICE O/P EST SF 10 MIN: CPT

## 2024-01-03 ASSESSMENT — ENCOUNTER SYMPTOMS
CHILLS: 0
SPUTUM PRODUCTION: 0
WEAKNESS: 0
BLURRED VISION: 0
FOCAL WEAKNESS: 0
PALPITATIONS: 0
DIZZINESS: 0
PND: 0
DOUBLE VISION: 0
HEMOPTYSIS: 0
NERVOUS/ANXIOUS: 1
TINGLING: 1
NECK PAIN: 0
EYE PAIN: 0
SHORTNESS OF BREATH: 0
SEIZURES: 0
CLAUDICATION: 0
TREMORS: 0
INSOMNIA: 0
HEADACHES: 0
MYALGIAS: 0
COUGH: 0
FEVER: 0
ORTHOPNEA: 0
PHOTOPHOBIA: 0
WHEEZING: 0

## 2024-01-03 ASSESSMENT — FIBROSIS 4 INDEX: FIB4 SCORE: 0.52

## 2024-01-03 NOTE — PROGRESS NOTES
FOLLOW-UP VASCULAR MEDICINE VISIT  01/03/24      Elzbieta Rao is a female  who presents for f/u  initially referred by Lionel Angela M.D. for eval and med mgmt of carotid artery  dissection,   est 12/2023     Subjective       Interval hx/concerns: last seen a few weeks ago.  No intervening new focal s/s.  Feeling well.  Had imaging completed and new labs.  Tolerating meds.      L carotid artery dissection   Initial visit hx:  admit 12/12/23 after several day(started on 12/8/23)  hx of worsening HA with left-sided headache, left cheek numbness, and right upper extremity numbness. , seen in ED and had CTA neck showing L ICA acute dissection. No recent MVA, chiropracting, heavy lifting.  No fhx of arterial dissections or connective tissue d/o.  MR brain neg for CVA.  D/c home 12/14/23.  Returned to [facility] with transient tingling and number of the R proximal thigh and R trunk, lasting 1-3min.  No facial or tongue sx.  Repeat CTA head/neck showed no new findings other than stable L ICA dissectcion.    Prior hx of CVA/TIA or symptoms: as noted, however no prior event s  Prior surgical interventions: none   No hx of recent MVA, chiropracting or high velocity injuries.    No fhx of connective tissue d/o or known vasculitides reported   No hx of heart conditions, tachyarrhythmias     HTN: no prior dx or meds,  does not check home BP     Hyperlipidemia: no prior dx or meds     Antiplatelet/anticoagulation: Yes, Details: eliquis 5mg BID , no hx of bleeding        Current Outpatient Medications:     apixaban, 5 mg, Oral, BID, Taking    ondansetron, 4 mg, Oral, Q6HRS PRN, Taking    acetaminophen, 500-1,000 mg, Oral, Q6HRS PRN, Taking     No Known Allergies     Social History     Tobacco Use    Smoking status: Never    Smokeless tobacco: Never   Vaping Use    Vaping Use: Never used   Substance Use Topics    Alcohol use: Yes     Alcohol/week: 1.2 oz     Types: 2 Standard drinks or equivalent per week     Comment: social   "   Drug use: No     Review of Systems   Constitutional:  Negative for chills, fever and malaise/fatigue.   Eyes:  Negative for blurred vision, double vision, photophobia and pain.   Respiratory:  Negative for cough, hemoptysis, sputum production, shortness of breath and wheezing.    Cardiovascular:  Negative for chest pain, palpitations, orthopnea, claudication, leg swelling and PND.   Musculoskeletal:  Negative for myalgias and neck pain.   Neurological:  Positive for tingling (bilat distal fingers and toes, new over last several days). Negative for dizziness, tremors, focal weakness, seizures, weakness and headaches.   Psychiatric/Behavioral:  The patient is nervous/anxious. The patient does not have insomnia.            Objective   Vitals:    01/03/24 0825 01/03/24 0828   BP: 123/84 115/78   BP Location: Left arm Left arm   Patient Position: Sitting Sitting   BP Cuff Size: Adult Adult   Pulse: 73 79   Weight: 65.8 kg (145 lb)    Height: 1.702 m (5' 7\")         BP Readings from Last 5 Encounters:   01/03/24 115/78   12/20/23 112/78   12/18/23 113/76   12/17/23 109/67   12/14/23 126/82      Body mass index is 22.71 kg/m².  Wt Readings from Last 3 Encounters:   01/03/24 65.8 kg (145 lb)   12/20/23 68 kg (149 lb 14.4 oz)   12/18/23 66.2 kg (146 lb)      Physical Exam  Vitals reviewed.   Constitutional:       General: She is not in acute distress.     Appearance: Normal appearance.   HENT:      Head: Normocephalic and atraumatic.   Neck:      Thyroid: No thyroid mass.      Vascular: Normal carotid pulses.      Trachea: Trachea normal.   Cardiovascular:      Rate and Rhythm: Normal rate and regular rhythm.      Chest Wall: PMI is not displaced.      Pulses: Normal pulses.           Carotid pulses are 2+ on the right side and 2+ on the left side.       Radial pulses are 2+ on the right side and 2+ on the left side.        Dorsalis pedis pulses are 2+ on the right side and 2+ on the left side.        Posterior tibial " "pulses are 2+ on the right side and 2+ on the left side.      Heart sounds: Normal heart sounds.      Comments: No carotid bruits noted   Pulmonary:      Effort: Pulmonary effort is normal.      Breath sounds: Normal breath sounds.   Musculoskeletal:      Cervical back: Full passive range of motion without pain.      Right lower leg: No edema.      Left lower leg: No edema.   Skin:     General: Skin is warm and dry.      Capillary Refill: Capillary refill takes less than 2 seconds.      Coloration: Skin is not cyanotic.      Nails: There is no clubbing.   Neurological:      General: No focal deficit present.      Mental Status: She is alert and oriented to person, place, and time. Mental status is at baseline.      Cranial Nerves: No cranial nerve deficit.      Coordination: Coordination is intact. Coordination normal.      Gait: Gait is intact. Gait normal.   Psychiatric:         Mood and Affect: Mood normal.         Behavior: Behavior normal.       Lab Results   Component Value Date    CHOLSTRLTOT 194 12/22/2023     (H) 12/22/2023    HDL 78 12/22/2023    TRIGLYCERIDE 43 12/22/2023      Lab Results   Component Value Date/Time    LIPOPROTA <6 12/22/2023 09:57 AM      No results found for: \"APOB\"    Lab Results   Component Value Date    PROTHROMBTM 13.7 12/12/2023    INR 1.04 12/12/2023         Lab Results   Component Value Date    SODIUM 138 12/22/2023    POTASSIUM 4.6 12/22/2023    CHLORIDE 101 12/22/2023    CO2 27 12/22/2023    GLUCOSE 83 12/22/2023    BUN 12 12/22/2023    CREATININE 0.75 12/22/2023        Lab Results   Component Value Date    WBC 7.0 12/17/2023    RBC 4.36 12/17/2023    HEMOGLOBIN 14.1 12/17/2023    HEMATOCRIT 41.2 12/17/2023    MCV 94.5 12/17/2023    MCH 32.3 12/17/2023    MCHC 34.2 12/17/2023    MPV 9.9 12/17/2023         No results found for: \"MICROALBCALC\", \"MALBCRT\", \"MALBEXCR\", \"DRRSAW98\", \"MICROALBUR\", \"MICRALB\", \"UMICROALBUM\", \"MICROALBTIM\"    Latest Reference Range & Units " 12/22/23 09:57   Stat C-Reactive Protein 0.00 - 0.75 mg/dL <0.30   Myeloperoxidase Ab 0 - 19 AU/mL 0   Serine Proteinase 3, IgG 0 - 19 AU/mL 0     VASCULAR IMAGING:    UMA duplex 12/2023    1.  No sonographic evidence of renal artery stenosis or fibromuscular    dysplasia.    CTA head 12/12/23  The distal cervical ICA and haleigh cavernous segments are asymmetrically small compared to the right. Just proximal to the skull base there is a focal partially linear hypodensity in filling defect with associated luminal narrowing suspicious for dissection. In the proximal petrous portion in the proximal carotid canal there likely is a severe high-grade focal stenosis also present.  CT angiogram of the [place] within normal limits.   No acute intracranial abnormality.    CTA neck 12/12/23  Flap-like filling defect in the left internal carotid artery petrous segment suspicious for dissection. Findings better seen and evaluated on the dedicated CTA of the head, report to follow.     CTA neck/head 12/16/23  No significant interval change in asymmetrically small distal cervical ICA and haleigh cavernous segments with presumed small short segment dissection and severe focal stenosis as described above.   There is no new intracranial large vessel occlusion. No aneurysm.   No acute intracranial hemorrhage.    MR brain 12/16/23  1.  MRI of the brain without contrast within normal limits.  2.  Narrowing of the distal LEFT cervical and LEFT petrous-cavernous internal carotid artery redemonstrated, in keeping with the patient's known dissection    MRA head 12/16/23  Redemonstrated LEFT cervical ICA dissection without compelling evidence of interval. Findings are suggestive of slower flow in the imaged LEFT ICA as well as the LEFT MCA branches compared to the contralateral side.     CTA complete Ao 12/20/23  No aortic aneurysm or dissection identified. No evidence of large or medium size vessel vasculitis.        Medical Decision  Making:  Today's Assessment / Status / Plan:     1. Dissection of left carotid artery (HCC)  CT-CTA HEAD WITH & W/O-POST PROCESS    CT-CTA NECK WITH & W/O-POST PROCESSING      2. Left carotid stenosis        3. Other hyperlipidemia  Lipid Profile    CRP HIGH SENSITIVE (CARDIAC)      4. History of transient ischemic attack (TIA)        5. Chronic anticoagulation        6. Tingling in extremities            Patient Type: Primary Prevention    Etiology of Established CVD if Present:     1) acute, extracranial, spontaneous L internal carotid artery dissection - associated with TIA 12/2023   - Has associated proximal petrous segment high-grade stenosis, presumed from false luminal compression of true lumen distal to dissection flap   - No indications of completed cerebral infarction per MRI/MRA   - limited evidence to guide choice between OAC vs antiplat tx, however extracranial dissections are generally tx with OAC x 6mo whether VKA vs DOAC, as have lower risk for SAH development due to extracranial nature   - in light of female, early adulthood spontaneous the ddx would include idiopathic, trauma-induced, vasculitis, FMD, connective tissue d/o, vs other   - pt reports no prior trauma to incident though has practiced moderate intensity yoga over last 1yr - no major head stands  - denies chiropracting, MVA or other neck trauma preceding incident   - CTA complete 12/2023 showed no indications of large vessel vasculitis changes or other aortic disease including large atheromas or dissections/aneurysms  - ANCA and CRP normal ranges   Plan:  - see antithrombotic tx plan below  - monitor for new focal neuro s/s   - pending f/u with neurology   - monitor BP and other risk factors   - update CTA head/neck at 6mo (6/2024) for eval of dissection healing, recannalization, and next steps in ongoing tx and surveillance     2) TIA - resolved, no residual sx - as reviewed with pt bilat distal finger/toe tingling would not be a classic  feature of L carotid dissection, however, I definitely would like to get neurology involved for stroke bridge   ABCD2 score = 1 pt (low)  Per the validation study, 0-3 points: Low Risk  2-Day Stroke Risk: 1.0%  7-Day Stroke Risk: 1.2%  90-Day Stroke Risk: 3.1%  Plan  - continue tx plan as noted   - pending with neurology for co-mgmt   - seek prompt attention or call 911 if acute, focal neuro s/s     BLOOD PRESSURE CONTROL   ACC/AHA (2017) goal <130/80  Home BP at goal: yes  Office BP at goal:  yes   Plan:   - continue healthy diet, activity, weight mgmt   Monitoring:   - routine clinic-based BP measurements at least once annually   Medications: no meds indicated at this time      LIPID MANAGEMENT:   Qualifies for Statin Therapy Based on 2018 ACC/AHA Guidelines: no  The ASCVD Risk score (Abdoul CRISTINA, et al., 2019) failed to calculate. (Low)  Major ASCVD events: None  High-risk conditions: None   Risk-enhancers: N/A  Currently on Statin: No - review options for low dose statin for vasc inflammation reduction and vasc medial integrity maintenance (ie pleotrophic effects) in addition to LDL-C reduction   Tx goals: LDL-C <100 (consider non-HDL-C <130, apoB <90)  At goal? No, 12/2023   Plan:   - reinforced ongoing TLC measures as noted   - defer lab surveillance to PCP   Meds: could consider mod intensity statin to reduce vascular inflammation - at this time, through shared MDM, we will hold   - consider plant sterols/stanols for now     ANTITHROMBOTIC/ANTIPLATELET THERAPY:   - as reviewed with pt, generally extracranial ICA dissection is treated with OAC however antiplat is acceptable alternative  - for now, continue eliquis 5mg BID (as recommended by vascular neurologist) x 6mo (6/2024), then we will plan to transition to plavix indefinitely if CTA neck shows stable healing   - will value input from neurology as well     GLYCEMIC STATUS: Normal    LIFESTYLE INTERVENTIONS:    SMOKING:     reports that she has never smoked.  She has never used smokeless tobacco.   - continued complete avoidance of all tobacco products     PHYSICAL ACTIVITY: continue healthy activity to improve CV fitness.  In general, targeting >150min/week of moderate-level activity or as much as tolerated in light of functional status and co-morbidities   - ok to advance to low to moderate activity (3-4 METs) including sexual activity, then return to full activity after by end of January    WEIGHT MANAGEMENT AND NUTRITION: Mediterranean style dietary approach      OTHER: none     Instructed to follow-up with PCP for remainder of adult medical needs: yes  We will partner with other providers in the management of established vascular disease and cardiometabolic risk factors.    Studies to Be Obtained: CTA head 6/2024 - RN to track   Labs to Be Obtained: as noted above     Follow up in: June, sooner prn     Time: 35min - chart review/prep, review of other providers' records, imaging/lab review, face-to-face time for history/examination, ordering, prescribing,  review of results/meds/ treatment plan with patient/family/caregiver, documentation in EMR, care coordination (as needed)     James Grant M.D.  [facility]   [facility]   [phone]

## 2024-01-05 DIAGNOSIS — R30.0 DYSURIA: ICD-10-CM

## 2024-01-06 ENCOUNTER — HOSPITAL ENCOUNTER (OUTPATIENT)
Facility: MEDICAL CENTER | Age: 34
End: 2024-01-06
Attending: NURSE PRACTITIONER
Payer: COMMERCIAL

## 2024-01-06 DIAGNOSIS — R30.0 DYSURIA: ICD-10-CM

## 2024-01-06 LAB
APPEARANCE UR: ABNORMAL
BACTERIA #/AREA URNS HPF: ABNORMAL /HPF
BILIRUB UR QL STRIP.AUTO: NEGATIVE
COLOR UR: YELLOW
EPI CELLS #/AREA URNS HPF: ABNORMAL /HPF
GLUCOSE UR STRIP.AUTO-MCNC: NEGATIVE MG/DL
HYALINE CASTS #/AREA URNS LPF: ABNORMAL /LPF
KETONES UR STRIP.AUTO-MCNC: NEGATIVE MG/DL
LEUKOCYTE ESTERASE UR QL STRIP.AUTO: ABNORMAL
MICRO URNS: ABNORMAL
NITRITE UR QL STRIP.AUTO: POSITIVE
PH UR STRIP.AUTO: 6.5 [PH] (ref 5–8)
PROT UR QL STRIP: 100 MG/DL
RBC # URNS HPF: ABNORMAL /HPF
RBC UR QL AUTO: ABNORMAL
RENAL EPI CELLS #/AREA URNS HPF: NEGATIVE /HPF
SP GR UR STRIP.AUTO: 1.02
UROBILINOGEN UR STRIP.AUTO-MCNC: 0.2 MG/DL
WBC #/AREA URNS HPF: ABNORMAL /HPF

## 2024-01-06 PROCEDURE — 87077 CULTURE AEROBIC IDENTIFY: CPT

## 2024-01-06 PROCEDURE — 81001 URINALYSIS AUTO W/SCOPE: CPT

## 2024-01-06 PROCEDURE — 87186 SC STD MICRODIL/AGAR DIL: CPT

## 2024-01-06 PROCEDURE — 87086 URINE CULTURE/COLONY COUNT: CPT

## 2024-01-08 DIAGNOSIS — I77.71 INTERNAL CAROTID ARTERY DISSECTION (HCC): ICD-10-CM

## 2024-01-09 LAB
BACTERIA UR CULT: ABNORMAL
BACTERIA UR CULT: ABNORMAL
SIGNIFICANT IND 70042: ABNORMAL
SITE SITE: ABNORMAL
SOURCE SOURCE: ABNORMAL

## 2024-01-19 ENCOUNTER — HOSPITAL ENCOUNTER (OUTPATIENT)
Facility: MEDICAL CENTER | Age: 34
End: 2024-01-19
Payer: COMMERCIAL

## 2024-01-19 DIAGNOSIS — N30.90 CYSTITIS: ICD-10-CM

## 2024-01-19 PROCEDURE — 87086 URINE CULTURE/COLONY COUNT: CPT

## 2024-01-21 LAB
BACTERIA UR CULT: NORMAL
SIGNIFICANT IND 70042: NORMAL
SITE SITE: NORMAL
SOURCE SOURCE: NORMAL

## 2024-02-12 ENCOUNTER — OFFICE VISIT (OUTPATIENT)
Dept: NEUROLOGY | Facility: MEDICAL CENTER | Age: 34
End: 2024-02-12
Attending: PSYCHIATRY & NEUROLOGY
Payer: COMMERCIAL

## 2024-02-12 VITALS
WEIGHT: 145.06 LBS | DIASTOLIC BLOOD PRESSURE: 62 MMHG | BODY MASS INDEX: 22.77 KG/M2 | HEART RATE: 62 BPM | OXYGEN SATURATION: 98 % | SYSTOLIC BLOOD PRESSURE: 114 MMHG | RESPIRATION RATE: 15 BRPM | HEIGHT: 67 IN | TEMPERATURE: 97.8 F

## 2024-02-12 DIAGNOSIS — I77.71 INTERNAL CAROTID ARTERY DISSECTION (HCC): ICD-10-CM

## 2024-02-12 PROCEDURE — 99204 OFFICE O/P NEW MOD 45 MIN: CPT | Performed by: PSYCHIATRY & NEUROLOGY

## 2024-02-12 PROCEDURE — 99211 OFF/OP EST MAY X REQ PHY/QHP: CPT | Performed by: PSYCHIATRY & NEUROLOGY

## 2024-02-12 ASSESSMENT — PATIENT HEALTH QUESTIONNAIRE - PHQ9: CLINICAL INTERPRETATION OF PHQ2 SCORE: 0

## 2024-02-12 ASSESSMENT — FIBROSIS 4 INDEX: FIB4 SCORE: 0.52

## 2024-02-12 NOTE — PROGRESS NOTES
"[facility] NEUROLOGY  GENERAL NEUROLOGY  NEW PATIENT VISIT    Referral source: James Grant MD    CC: \"dissection of left carotid artery, TIA\"    HISTORY OF ILLNESS:  Elzbieta Rao is a 33 y.o. woman with a history most notable for HLD, carotid artery dissection (left), TIA, chronic anticoagulation.  Today, she was unaccompanied, and she provided the following history:    12/7/2023:  Onset of headache and vision changes (\"shifty\" on the left) as well as transient, right-sided (hand and forearm) sensory loss.    The headaches have mostly resolved, but occasionally she will feel \"pulsations\" over the lower neck (right>left).  There are no obvious triggers.  The pulses last \"seconds\" at a time.  These symptoms are becoming less frequent (recently once daily at most).  There are no associated visual sensations, hearing changes, or lightheadedness.\"    The sensation of the right hand is normal.    5/21/2024:  Elzbieta is scheduled for repeat CTAs.    MEDICAL AND SURGICAL HISTORY:  Past Medical History:   Diagnosis Date    Dissection of left carotid artery (HCC)      Past Surgical History:   Procedure Laterality Date    OTHER  02/2020    left knee surgery ACL replaced     REPAIR, KNEE, ACL Left 2020    ABDOMINAL EXPLORATION  2010    endometrial polyp removal     OTHER      uterine polyp removed at age 19     MEDICATIONS:  Current Outpatient Medications   Medication Sig    apixaban (ELIQUIS) 5mg Tab Take 1 Tablet by mouth 2 times a day. Indications: carotid artery dissection    ondansetron (ZOFRAN ODT) 4 MG TABLET DISPERSIBLE Take 1 Tablet by mouth every 6 hours as needed for Nausea/Vomiting.    acetaminophen (TYLENOL) 500 MG Tab Take 500-1,000 mg by mouth every 6 hours as needed for Moderate Pain.     SOCIAL HISTORY:  Social History     Tobacco Use    Smoking status: Never    Smokeless tobacco: Never   Substance Use Topics    Alcohol use: Yes     Alcohol/week: 1.2 oz     Types: 2 Standard drinks or equivalent per " week     Comment: social      Social History     Social History Narrative    Not on file     FAMILY HISTORY:  Family History   Problem Relation Age of Onset    Hypertension Mother         heart murmur    Atrial fibrillation Mother         DOAC    Angina Mother     No Known Problems Father     No Known Problems Sister     No Known Problems Brother     Stroke Maternal Grandfather     Cancer Paternal Grandmother     Heart Attack Neg Hx     Aortic dissection Neg Hx     Peripheral vascular disease Neg Hx      REVIEW OF SYSTEMS:  A ROS was completed.  Pertinent positives and negatives were included in the HPI, above.  All other systems were reviewed and are negative.    PHYSICAL EXAM:  General/Medical:  - NAD  - hair, skin, nails, and joints were normal    Neuro:  MENTAL STATUS: awake and alert; no deficits of speech or language; oriented to person, place, and time; affect was appropriate to situation    CRANIAL NERVES:    II: acuity: J1+-1/J1-1, fields: intact to confrontation, pupils: 4/4 to 3/3 without a relative afferent pupillary defect, discs: sharp    III/IV/VI: versions: intact without nystagmus    V: facial sensation: symmetric to light touch    VII: facial expression: symmetric    VIII: hearing: intact to finger rub    IX/X: palate: elevates symmetrically    XI: shoulder shrug: symmetric    XII: tongue: midline    MOTOR:  - bulk: normal throughout  - tone: NT  Upper Extremity Strength  (R/L)    5/5   Elbow flexion 5/5   Elbow extension 5/5   Shoulder abduction 5/5     Lower Extremity Strength  (R/L)   Hip flexion 5/5   Knee extension 5/5   Knee flexion 5/5   Ankle dorsiflexion NT   Ankle plantarflexion NT     - heel-walk: NT  - toe-walk: NT  - pronator drift: absent  - abnormal movements: none    SENSATION:  - light touch: grossly intact over the upper- and lower extremities  - vibration (R/L, seconds): NT/NT at the great toes  - pinprick: NT  - proprioception: NT  - Romberg: absent    COORDINATION:  -  "finger to nose: normal, no ataxia on exam  - finger tapping: rapid and accurate, bilaterally    REFLEXES:  Reflex Right Left   BR 2+ 2+   Biceps 2+ 2+   Triceps 2+ 2+   Patellae 3+ 3+   Achilles NT NT   Toes NT NT     GAIT:  - narrow base and normal  - tandem gait: intact    REVIEW OF IMAGING STUDIES: I reviewed the following studies:  MRI Brain:  Date: 12/12/2023  W/o and w/ contrast?: without  Indication: \"?stroke\"  Comparison: CTA neck from earlier today  Impression:  \"No acute process. No evidence of an acute infarction.  Wall hematoma noted at the site of the dissection in the left petrous ICA.\"    CTA Head/Neck:  Date: 12/16/2023  W/o and w/ contrast?: yes  Indication: \"recent carotid dissection\"  Comparison: 12/12/2023  Impression:  \"Head:  No significant interval change in asymmetrically small distal cervical ICA and haleigh cavernous segments with presumed small short segment dissection and severe focal stenosis as described above.  There is no new intracranial large vessel occlusion. No aneurysm.  No acute intracranial hemorrhage.  Neck:  1) Again seen linear filling defects within the left distal cervical and carotid canal segments of the left internal carotid artery consistent with dissection. This is similar in appearance to prior exam. No evidence of complete occlusion.  2) Normal variant diminutive left vertebral artery.\"    REVIEW OF LABORATORY STUDIES:  No recent data available.    ASSESSMENT:  Elzbieta Rao is a 33 y.o. woman with a history of left internal carotid artery dissection and TIA.  While aspirin therapy would probably have been sufficient treatment, I am not against 3-6 months of anticoagulation with Eliquis, especially since she seems to be tolerating this without complications.  Afterward, she can transition to daily aspirin.    PLAN:  Left Internal Carotid Artery Dissection w/ h/o TIA:  - ok to continue Eliquis x3-6 months  - afterward, transition to aspirin daily  - no " "additional workup at this time    Follow-Up:  - Return if symptoms worsen or fail to improve.    Signed: Sukhjinder Gotti M.D.    BILLING DOCUMENTATION:   I spent 56 minutes reviewing the medical record, interviewing and examining the patient, discussing my impression (see \"assessment\" above), and coordinating care.    "

## 2024-02-13 NOTE — TELEPHONE ENCOUNTER
Received request via: Patient    Was the patient seen in the last year in this department? Yes    Does the patient have an active prescription (recently filled or refills available) for medication(s) requested? No    [facility]    Does the patient have half-way Plus and need 100 day supply (blood pressure, diabetes and cholesterol meds only)? Patient does not have SCP

## 2024-03-01 DIAGNOSIS — R30.0 DYSURIA: ICD-10-CM

## 2024-03-02 ENCOUNTER — HOSPITAL ENCOUNTER (OUTPATIENT)
Facility: MEDICAL CENTER | Age: 34
End: 2024-03-02
Attending: NURSE PRACTITIONER
Payer: COMMERCIAL

## 2024-03-02 DIAGNOSIS — R30.0 DYSURIA: ICD-10-CM

## 2024-03-02 LAB
APPEARANCE UR: CLEAR
BILIRUB UR QL STRIP.AUTO: NEGATIVE
COLOR UR: YELLOW
GLUCOSE UR STRIP.AUTO-MCNC: NEGATIVE MG/DL
KETONES UR STRIP.AUTO-MCNC: NEGATIVE MG/DL
LEUKOCYTE ESTERASE UR QL STRIP.AUTO: NEGATIVE
MICRO URNS: NORMAL
NITRITE UR QL STRIP.AUTO: NEGATIVE
PH UR STRIP.AUTO: 6 [PH] (ref 5–8)
PROT UR QL STRIP: NEGATIVE MG/DL
RBC UR QL AUTO: NEGATIVE
SP GR UR STRIP.AUTO: 1.01
UROBILINOGEN UR STRIP.AUTO-MCNC: 0.2 MG/DL

## 2024-03-02 PROCEDURE — 81003 URINALYSIS AUTO W/O SCOPE: CPT

## 2024-03-06 ENCOUNTER — APPOINTMENT (RX ONLY)
Dept: URBAN - METROPOLITAN AREA CLINIC 20 | Facility: CLINIC | Age: 34
Setting detail: DERMATOLOGY
End: 2024-03-06

## 2024-03-06 DIAGNOSIS — L57.8 OTHER SKIN CHANGES DUE TO CHRONIC EXPOSURE TO NONIONIZING RADIATION: ICD-10-CM

## 2024-03-06 DIAGNOSIS — L82.1 OTHER SEBORRHEIC KERATOSIS: ICD-10-CM

## 2024-03-06 DIAGNOSIS — D22 MELANOCYTIC NEVI: ICD-10-CM

## 2024-03-06 DIAGNOSIS — L81.4 OTHER MELANIN HYPERPIGMENTATION: ICD-10-CM

## 2024-03-06 DIAGNOSIS — D18.0 HEMANGIOMA: ICD-10-CM

## 2024-03-06 PROBLEM — D22.5 MELANOCYTIC NEVI OF TRUNK: Status: ACTIVE | Noted: 2024-03-06

## 2024-03-06 PROBLEM — D18.01 HEMANGIOMA OF SKIN AND SUBCUTANEOUS TISSUE: Status: ACTIVE | Noted: 2024-03-06

## 2024-03-06 PROCEDURE — 99213 OFFICE O/P EST LOW 20 MIN: CPT

## 2024-03-06 PROCEDURE — ? OBSERVATION AND MEASURE

## 2024-03-06 PROCEDURE — ? COUNSELING

## 2024-03-06 ASSESSMENT — LOCATION SIMPLE DESCRIPTION DERM
LOCATION SIMPLE: RIGHT THIGH
LOCATION SIMPLE: RIGHT FOREARM
LOCATION SIMPLE: CHEST
LOCATION SIMPLE: LEFT FOREARM
LOCATION SIMPLE: LEFT CHEEK
LOCATION SIMPLE: ABDOMEN
LOCATION SIMPLE: RIGHT UPPER ARM
LOCATION SIMPLE: RIGHT CHEEK
LOCATION SIMPLE: RIGHT UPPER BACK
LOCATION SIMPLE: NOSE
LOCATION SIMPLE: RIGHT CALF
LOCATION SIMPLE: LEFT CALF
LOCATION SIMPLE: LEFT UPPER ARM
LOCATION SIMPLE: LEFT THIGH

## 2024-03-06 ASSESSMENT — LOCATION DETAILED DESCRIPTION DERM
LOCATION DETAILED: RIGHT PROXIMAL CALF
LOCATION DETAILED: RIGHT MID-UPPER BACK
LOCATION DETAILED: RIGHT ANTERIOR PROXIMAL THIGH
LOCATION DETAILED: LEFT ANTERIOR PROXIMAL THIGH
LOCATION DETAILED: RIGHT ANTERIOR PROXIMAL UPPER ARM
LOCATION DETAILED: RIGHT ANTERIOR DISTAL THIGH
LOCATION DETAILED: LEFT PROXIMAL CALF
LOCATION DETAILED: LEFT INFERIOR CENTRAL MALAR CHEEK
LOCATION DETAILED: RIGHT CENTRAL MALAR CHEEK
LOCATION DETAILED: LEFT DISTAL DORSAL FOREARM
LOCATION DETAILED: LEFT MEDIAL SUPERIOR CHEST
LOCATION DETAILED: NASAL DORSUM
LOCATION DETAILED: LEFT ANTERIOR PROXIMAL UPPER ARM
LOCATION DETAILED: RIGHT DISTAL DORSAL FOREARM
LOCATION DETAILED: LEFT ANTERIOR DISTAL THIGH
LOCATION DETAILED: LEFT LATERAL ABDOMEN
LOCATION DETAILED: RIGHT SUPERIOR MEDIAL UPPER BACK

## 2024-03-06 ASSESSMENT — LOCATION ZONE DERM
LOCATION ZONE: FACE
LOCATION ZONE: LEG
LOCATION ZONE: TRUNK
LOCATION ZONE: NOSE
LOCATION ZONE: ARM

## 2024-05-21 ENCOUNTER — HOSPITAL ENCOUNTER (OUTPATIENT)
Dept: RADIOLOGY | Facility: MEDICAL CENTER | Age: 34
End: 2024-05-21
Attending: FAMILY MEDICINE
Payer: COMMERCIAL

## 2024-05-21 DIAGNOSIS — I77.71 DISSECTION OF LEFT CAROTID ARTERY (HCC): Chronic | ICD-10-CM

## 2024-05-21 RX ADMIN — IOHEXOL 100 ML: 350 INJECTION, SOLUTION INTRAVENOUS at 16:17

## 2024-05-22 ENCOUNTER — DOCUMENTATION (OUTPATIENT)
Dept: VASCULAR LAB | Facility: MEDICAL CENTER | Age: 34
End: 2024-05-22
Payer: COMMERCIAL

## 2024-05-22 NOTE — PROGRESS NOTES
CTA neck/head reviewed.   Reduced narrowing of L intracranial ICA with persistent diss flap.  Normal R ICA.  No other new findings.     Anticipate 1yr repeat CTA head/neck for ongoing  surveillance (5/2025) - RN to track

## 2024-05-28 ENCOUNTER — OFFICE VISIT (OUTPATIENT)
Dept: VASCULAR LAB | Facility: MEDICAL CENTER | Age: 34
End: 2024-05-28
Attending: FAMILY MEDICINE
Payer: COMMERCIAL

## 2024-05-28 VITALS
WEIGHT: 145 LBS | HEART RATE: 72 BPM | HEIGHT: 67 IN | BODY MASS INDEX: 22.76 KG/M2 | SYSTOLIC BLOOD PRESSURE: 125 MMHG | DIASTOLIC BLOOD PRESSURE: 83 MMHG

## 2024-05-28 DIAGNOSIS — G45.9 TIA (TRANSIENT ISCHEMIC ATTACK): ICD-10-CM

## 2024-05-28 DIAGNOSIS — Z86.73 HISTORY OF TRANSIENT ISCHEMIC ATTACK (TIA): ICD-10-CM

## 2024-05-28 DIAGNOSIS — I77.71 DISSECTION OF LEFT CAROTID ARTERY (HCC): ICD-10-CM

## 2024-05-28 DIAGNOSIS — E78.49 OTHER HYPERLIPIDEMIA: ICD-10-CM

## 2024-05-28 DIAGNOSIS — R20.2 TINGLING IN EXTREMITIES: ICD-10-CM

## 2024-05-28 DIAGNOSIS — Z79.01 CHRONIC ANTICOAGULATION: ICD-10-CM

## 2024-05-28 DIAGNOSIS — Z79.02 LONG TERM (CURRENT) USE OF ANTITHROMBOTICS/ANTIPLATELETS: ICD-10-CM

## 2024-05-28 PROCEDURE — 3079F DIAST BP 80-89 MM HG: CPT | Performed by: FAMILY MEDICINE

## 2024-05-28 PROCEDURE — 99214 OFFICE O/P EST MOD 30 MIN: CPT | Performed by: FAMILY MEDICINE

## 2024-05-28 PROCEDURE — 3074F SYST BP LT 130 MM HG: CPT | Performed by: FAMILY MEDICINE

## 2024-05-28 RX ORDER — ASPIRIN 81 MG/1
81 TABLET ORAL DAILY
COMMUNITY
Start: 2024-05-28

## 2024-05-28 ASSESSMENT — ENCOUNTER SYMPTOMS
CHILLS: 0
HEMOPTYSIS: 0
SPUTUM PRODUCTION: 0
DOUBLE VISION: 0
WHEEZING: 0
NECK PAIN: 0
INSOMNIA: 0
BLURRED VISION: 0
SHORTNESS OF BREATH: 0
PALPITATIONS: 0
WEAKNESS: 0
COUGH: 0
CLAUDICATION: 0
SEIZURES: 0
FOCAL WEAKNESS: 0
TINGLING: 1
DIZZINESS: 0
PHOTOPHOBIA: 0
PND: 0
MYALGIAS: 0
FEVER: 0
NERVOUS/ANXIOUS: 1
HEADACHES: 0
TREMORS: 0
ORTHOPNEA: 0
EYE PAIN: 0

## 2024-05-28 ASSESSMENT — FIBROSIS 4 INDEX: FIB4 SCORE: 0.53

## 2024-05-28 NOTE — PROGRESS NOTES
FOLLOW-UP VASCULAR MEDICINE VISIT  01/03/24      Elzbieta Rao is a female  who presents for f/u  initially referred by Lionel Angela M.D. for eval and med mgmt of carotid artery  dissection,   est 12/2023     Subjective       Interval hx/concerns: last seen 1/2024, had CTA, pending labs next month.  No new sx.  Feeling well.  Pending extended travel and wondering about her risks.      L carotid artery dissection   Initial visit hx:  admit 12/12/23 after several day(started on 12/8/23)  hx of worsening HA with left-sided headache, left cheek numbness, and right upper extremity numbness. , seen in ED and had CTA neck showing L ICA acute dissection. No recent MVA, chiropracting, heavy lifting.  No fhx of arterial dissections or connective tissue d/o.  MR brain neg for CVA.  D/c home 12/14/23.  Returned to [facility] with transient tingling and number of the R proximal thigh and R trunk, lasting 1-3min.  No facial or tongue sx.  Repeat CTA head/neck showed no new findings other than stable L ICA dissectcion.    Prior hx of CVA/TIA or symptoms: as noted, however no prior event s  Prior surgical interventions: none   No hx of recent MVA, chiropracting or high velocity injuries.    No fhx of connective tissue d/o or known vasculitides reported   No hx of heart conditions, tachyarrhythmias     HTN: no prior dx or meds,  does not check home BP   HLD: no prior dx or meds   Antiplatelet/anticoagulation: Yes, Details: eliquis 5mg BID , no hx of bleeding      No current outpatient medications on file prior to visit.     No current facility-administered medications on file prior to visit.       No Known Allergies     Social History     Tobacco Use    Smoking status: Never    Smokeless tobacco: Never   Vaping Use    Vaping status: Never Used   Substance Use Topics    Alcohol use: Yes     Alcohol/week: 1.2 oz     Types: 2 Standard drinks or equivalent per week     Comment: social     Drug use: No     Review of Systems  "  Constitutional:  Negative for chills, fever and malaise/fatigue.   Eyes:  Negative for blurred vision, double vision, photophobia and pain.   Respiratory:  Negative for cough, hemoptysis, sputum production, shortness of breath and wheezing.    Cardiovascular:  Negative for chest pain, palpitations, orthopnea, claudication, leg swelling and PND.   Musculoskeletal:  Negative for myalgias and neck pain.   Neurological:  Positive for tingling (bilat distal fingers and toes, new over last several days). Negative for dizziness, tremors, focal weakness, seizures, weakness and headaches.   Psychiatric/Behavioral:  The patient is nervous/anxious. The patient does not have insomnia.        Objective   Vitals:    05/28/24 0912 05/28/24 0915   BP: 131/83 125/83   BP Location: Left arm Left arm   Patient Position: Sitting Sitting   BP Cuff Size: Adult Adult   Pulse: 73 72   Weight: 65.8 kg (145 lb)    Height: 1.702 m (5' 7.01\")         BP Readings from Last 10 Encounters:   05/28/24 125/83   02/12/24 114/62   01/03/24 115/78   12/20/23 112/78   12/18/23 113/76   12/17/23 109/67   12/14/23 126/82   01/07/23 127/80   06/30/22 129/80   03/15/22 125/84      Body mass index is 22.7 kg/m².  Wt Readings from Last 3 Encounters:   05/28/24 65.8 kg (145 lb)   02/12/24 65.8 kg (145 lb 1 oz)   01/03/24 65.8 kg (145 lb)      Physical Exam  Vitals reviewed.   Constitutional:       General: She is not in acute distress.     Appearance: Normal appearance.   HENT:      Head: Normocephalic and atraumatic.   Neck:      Thyroid: No thyroid mass.      Vascular: Normal carotid pulses.      Trachea: Trachea normal.   Cardiovascular:      Rate and Rhythm: Normal rate and regular rhythm.      Chest Wall: PMI is not displaced.      Pulses: Normal pulses.           Carotid pulses are 2+ on the right side and 2+ on the left side.       Radial pulses are 2+ on the right side and 2+ on the left side.        Dorsalis pedis pulses are 2+ on the right side " "and 2+ on the left side.        Posterior tibial pulses are 2+ on the right side and 2+ on the left side.      Heart sounds: Normal heart sounds.      Comments: No carotid bruits noted   Pulmonary:      Effort: Pulmonary effort is normal.      Breath sounds: Normal breath sounds.   Musculoskeletal:      Cervical back: Full passive range of motion without pain.      Right lower leg: No edema.      Left lower leg: No edema.   Skin:     General: Skin is warm and dry.      Capillary Refill: Capillary refill takes less than 2 seconds.      Coloration: Skin is not cyanotic.      Nails: There is no clubbing.   Neurological:      General: No focal deficit present.      Mental Status: She is alert and oriented to person, place, and time. Mental status is at baseline.      Cranial Nerves: No cranial nerve deficit.      Coordination: Coordination is intact. Coordination normal.      Gait: Gait is intact. Gait normal.   Psychiatric:         Mood and Affect: Mood normal.         Behavior: Behavior normal.       Lab Results   Component Value Date    CHOLSTRLTOT 194 12/22/2023     (H) 12/22/2023    HDL 78 12/22/2023    TRIGLYCERIDE 43 12/22/2023     Lab Results   Component Value Date/Time    LIPOPROTA <6 12/22/2023 09:57 AM      No results found for: \"APOB\"   No results found for: \"CRPHIGHSEN\"      Lab Results   Component Value Date    PROTHROMBTM 13.7 12/12/2023    INR 1.04 12/12/2023         Lab Results   Component Value Date    SODIUM 138 12/22/2023    POTASSIUM 4.6 12/22/2023    CHLORIDE 101 12/22/2023    CO2 27 12/22/2023    GLUCOSE 83 12/22/2023    BUN 12 12/22/2023    CREATININE 0.75 12/22/2023        Lab Results   Component Value Date    WBC 7.0 12/17/2023    RBC 4.36 12/17/2023    HEMOGLOBIN 14.1 12/17/2023    HEMATOCRIT 41.2 12/17/2023    MCV 94.5 12/17/2023    MCH 32.3 12/17/2023    MCHC 34.2 12/17/2023    MPV 9.9 12/17/2023         No results found for: \"MICROALBCALC\", \"MALBCRT\", \"MALBEXCR\", \"CEXGTE92\", " "\"MICROALBUR\", \"MICRALB\", \"UMICROALBUM\", \"MICROALBTIM\"      Latest Reference Range & Units 12/22/23 09:57   Stat C-Reactive Protein 0.00 - 0.75 mg/dL <0.30   Myeloperoxidase Ab 0 - 19 AU/mL 0   Serine Proteinase 3, IgG 0 - 19 AU/mL 0     VASCULAR IMAGING:    UMA duplex 12/2023    1.  No sonographic evidence of renal artery stenosis or fibromuscular    dysplasia.    CTA head 12/12/23  The distal cervical ICA and haleigh cavernous segments are asymmetrically small compared to the right. Just proximal to the skull base there is a focal partially linear hypodensity in filling defect with associated luminal narrowing suspicious for dissection. In the proximal petrous portion in the proximal carotid canal there likely is a severe high-grade focal stenosis also present.  CT angiogram of the [place] within normal limits.   No acute intracranial abnormality.    CTA neck 12/12/23  Flap-like filling defect in the left internal carotid artery petrous segment suspicious for dissection. Findings better seen and evaluated on the dedicated CTA of the head, report to follow.     CTA neck/head 12/16/23  No significant interval change in asymmetrically small distal cervical ICA and haleigh cavernous segments with presumed small short segment dissection and severe focal stenosis as described above.   There is no new intracranial large vessel occlusion. No aneurysm.   No acute intracranial hemorrhage.    MR brain 12/16/23  1.  MRI of the brain without contrast within normal limits.  2.  Narrowing of the distal LEFT cervical and LEFT petrous-cavernous internal carotid artery redemonstrated, in keeping with the patient's known dissection    MRA head 12/16/23  Redemonstrated LEFT cervical ICA dissection without compelling evidence of interval. Findings are suggestive of slower flow in the imaged LEFT ICA as well as the LEFT MCA branches compared to the contralateral side.     CTA complete Ao 12/20/23  No aortic aneurysm or dissection " identified. No evidence of large or medium size vessel vasculitis.     CTA neck 5/2024  1.  Narrowing of the extracranial segment of the left internal carotid artery has resolved since previous examination. There is persistent stenosis of the proximal left petrous segment of the internal carotid artery. Subtle dissection flap not excluded.   2.  The right carotid arteries, right and left vertebral arteries are well-opacified and patent. No stenosis or dissection.    CTA head 5/2024  1.  Reduction in the degree of narrowing of the petrous and cavernous segments of the left internal carotid artery since previous examination. Focal narrowing of the proximal segment of the petrous segment persists.   2.  Linear dissection flap versus artifact within the petrous segment of the left internal carotid artery.   3.  [place] arteries and posterior cerebral arteries are opacified with no significant stenosis or occlusions identified.       Medical Decision Making:  Today's Assessment / Status / Plan:     1. Dissection of left carotid artery (HCC)  aspirin 81 MG EC tablet      2. Other hyperlipidemia        3. History of transient ischemic attack (TIA)  aspirin 81 MG EC tablet      4. Chronic anticoagulation        5. Tingling in extremities        6. TIA (transient ischemic attack)        7. Long term (current) use of antithrombotics/antiplatelets  aspirin 81 MG EC tablet        Etiology of Established CVD if Present:     1) acute, extracranial, spontaneous L internal carotid artery dissection - associated with TIA 12/2023 - stable, no new neuro s/s   - Has associated proximal petrous segment high-grade stenosis, presumed from false luminal compression of true lumen distal to dissection flap   - No indications of completed cerebral infarction per MRI/MRA   - limited evidence to guide choice between OAC vs antiplat tx, however extracranial dissections are generally tx with OAC x 6mo whether VKA vs DOAC, as have lower  risk for SAH development due to extracranial nature   - in light of female, early adulthood spontaneous the ddx would include idiopathic, trauma-induced, vasculitis, FMD, connective tissue d/o, vs other   - pt reports no prior trauma to incident though has practiced moderate intensity yoga over last 1yr - no major head stands  - denies chiropracting, MVA or other neck trauma preceding incident   - ANCA and CRP normal ranges   12/2023  CTA complete = no indications of large vessel vasculitis changes or other aortic disease including large atheromas or dissections/aneurysms  5/2024 CTA neck/head = stable, reduced stenosis, presumed residual dissection flap, no new findings  - as reviewed, with appropriate 2ry med mgmt, should have overall reduced risk for future TIA/CVA and cervical art dissection, melvi since no strong fhx, no indications of FMD or vasculitis on labs, imaging  - recurrence rates are uncertain and available data is inconsistent  Plan:  - see antithrombotic tx plan below  - monitor for new focal neuro s/s   - monitor BP and other risk factors   - update CTA neck 1yr (6/2025- RN to track, if stable, will monitor Q2yr  - reviewed she is safe for travel, but continue to advise to avoid activities that risk high-velocity rotation of the neck    2) TIA - resolved, no residual sx   Plan  - continue secondary prevention treatment goals, intensive med mgmt and lifestyle interventions   - seek prompt attention or call 911 if acute, focal neuro s/s     BLOOD PRESSURE CONTROL   ACC/AHA (2017) goal <130/80  Home BP at goal: yes  Office BP at goal:  no - sustained IDH   Plan:   - continue healthy diet, activity, weight mgmt   - she will maintain home BP log 3d/week for next 4 weeks and alert me of results via [employer]   Medications:   - start ARB as 1st agent, CCB as 2nd agent     LIPID MANAGEMENT:   Qualifies for Statin Therapy Based on 2018 ACC/AHA Guidelines: no  The ASCVD Risk score (Abdoul DK, et al., 2019)  failed to calculate. (Low)  Major ASCVD events: None    High-risk conditions: None   Risk-enhancers: N/A  Lp(a) <6  Currently on Statin: No - review options for low dose statin for vasc inflammation reduction and vasc medial integrity maintenance (ie pleotrophic effects) in addition to LDL-C reduction   Tx goals: LDL-C <100  At goal? No, 12/2023   Plan:   - reinforced ongoing TLC measures as noted   - pending labs   Meds:   - could consider mod intensity statin to reduce vascular inflammation - at this time, through shared MDM, we will hold   - consider plant sterols/stanols for now     ANTITHROMBOTIC/ANTIPLATELET THERAPY:   - as reviewed with pt, generally extracranial ICA dissection is treated with OAC, she has completed 6mo on eliquis full dose, so through shared MDM, we will transition to ASA 81mg daily indefinitely     GLYCEMIC STATUS: Normal    LIFESTYLE INTERVENTIONS  TOBACCO: continued complete avoidance of all tobacco products   PHYSICAL ACTIVITY: >150min/week of mod-intensity activity or as much as tolerated  NUTRITION: Mediterranean and reduce Na to 1500mg/day   ETOH: limit to 1 or less standard drinks/day  WT MGMT: maintain healthy weight     OTHER:     # family planning - avoid estrogen-containing compounds      Studies to Be Obtained: CTA head/neck - 5/2025 - RN to track   Labs to Be Obtained: as noted above     Follow up in: 6mo, sooner prtrey Grant M.D.  [facility]   [facility]   [phone]

## 2024-06-28 ENCOUNTER — APPOINTMENT (OUTPATIENT)
Dept: LAB | Facility: MEDICAL CENTER | Age: 34
End: 2024-06-28
Payer: COMMERCIAL

## 2024-07-11 ENCOUNTER — HOSPITAL ENCOUNTER (OUTPATIENT)
Dept: LAB | Facility: MEDICAL CENTER | Age: 34
End: 2024-07-11
Attending: FAMILY MEDICINE
Payer: COMMERCIAL

## 2024-07-11 DIAGNOSIS — E78.49 OTHER HYPERLIPIDEMIA: ICD-10-CM

## 2024-07-11 LAB
CHOLEST SERPL-MCNC: 186 MG/DL (ref 100–199)
CRP SERPL HS-MCNC: 0.2 MG/L (ref 0–3)
HDLC SERPL-MCNC: 72 MG/DL
LDLC SERPL CALC-MCNC: 102 MG/DL
TRIGL SERPL-MCNC: 60 MG/DL (ref 0–149)

## 2024-07-11 PROCEDURE — 36415 COLL VENOUS BLD VENIPUNCTURE: CPT

## 2024-07-11 PROCEDURE — 86141 C-REACTIVE PROTEIN HS: CPT

## 2024-07-11 PROCEDURE — 80061 LIPID PANEL: CPT

## 2024-11-17 ENCOUNTER — PATIENT MESSAGE (OUTPATIENT)
Dept: VASCULAR LAB | Facility: MEDICAL CENTER | Age: 34
End: 2024-11-17
Payer: COMMERCIAL

## 2024-11-17 DIAGNOSIS — I10 HYPERTENSION, UNSPECIFIED TYPE: ICD-10-CM

## 2024-11-18 ENCOUNTER — HOSPITAL ENCOUNTER (OUTPATIENT)
Dept: CARDIOLOGY | Facility: MEDICAL CENTER | Age: 34
End: 2024-11-18
Attending: NURSE PRACTITIONER
Payer: COMMERCIAL

## 2024-11-18 LAB — EKG IMPRESSION: NORMAL

## 2024-11-18 PROCEDURE — 93010 ELECTROCARDIOGRAM REPORT: CPT | Performed by: INTERNAL MEDICINE

## 2024-11-18 PROCEDURE — 93005 ELECTROCARDIOGRAM TRACING: CPT | Performed by: NURSE PRACTITIONER

## 2024-11-19 DIAGNOSIS — I77.71 INTERNAL CAROTID ARTERY DISSECTION (HCC): ICD-10-CM

## 2024-11-19 DIAGNOSIS — R00.2 PALPITATION: ICD-10-CM

## 2024-11-19 RX ORDER — LOSARTAN POTASSIUM 25 MG/1
25 TABLET ORAL DAILY
Qty: 100 TABLET | Refills: 3 | Status: SHIPPED | OUTPATIENT
Start: 2024-11-19

## 2024-11-19 NOTE — PROGRESS NOTES
Pt with ongoing diastolic hypertension 80-90s.   Will start low dose losartan 25mg daily and have pt continue to monitor bps.  Will need follow up when pt returns to area.    Svetlana MAO  [facility]

## 2024-11-21 ENCOUNTER — HOSPITAL ENCOUNTER (OUTPATIENT)
Dept: CARDIOLOGY | Facility: MEDICAL CENTER | Age: 34
End: 2024-11-21
Payer: COMMERCIAL

## 2024-11-21 DIAGNOSIS — R00.2 PALPITATION: ICD-10-CM

## 2024-11-21 DIAGNOSIS — I77.71 INTERNAL CAROTID ARTERY DISSECTION (HCC): ICD-10-CM

## 2024-11-21 LAB
LV EJECT FRACT  99904: 60
LV EJECT FRACT MOD 2C 99903: 75.55
LV EJECT FRACT MOD 4C 99902: 55.99
LV EJECT FRACT MOD BP 99901: 63.59

## 2024-11-21 PROCEDURE — 93306 TTE W/DOPPLER COMPLETE: CPT | Mod: 26 | Performed by: INTERNAL MEDICINE

## 2024-11-21 PROCEDURE — 93306 TTE W/DOPPLER COMPLETE: CPT

## 2024-12-05 ENCOUNTER — HOSPITAL ENCOUNTER (OUTPATIENT)
Dept: LAB | Facility: MEDICAL CENTER | Age: 34
End: 2024-12-05
Attending: NURSE PRACTITIONER
Payer: COMMERCIAL

## 2024-12-05 DIAGNOSIS — E78.00 ELEVATED CHOLESTEROL: ICD-10-CM

## 2024-12-05 LAB
CHOLEST SERPL-MCNC: 153 MG/DL (ref 100–199)
HDLC SERPL-MCNC: 73 MG/DL
LDLC SERPL CALC-MCNC: 71 MG/DL
TRIGL SERPL-MCNC: 45 MG/DL (ref 0–149)

## 2024-12-05 PROCEDURE — 36415 COLL VENOUS BLD VENIPUNCTURE: CPT

## 2024-12-05 PROCEDURE — 80061 LIPID PANEL: CPT

## 2025-04-07 DIAGNOSIS — G45.9 TIA (TRANSIENT ISCHEMIC ATTACK): ICD-10-CM

## 2025-04-07 DIAGNOSIS — I77.71 DISSECTION OF LEFT CAROTID ARTERY (HCC): ICD-10-CM

## 2025-04-07 DIAGNOSIS — I65.22 LEFT CAROTID STENOSIS: ICD-10-CM

## 2025-04-07 NOTE — PROGRESS NOTES
Studies to Be Obtained: CTA head/neck - 5/2025     Orders placed for vascular surveillance imaging.    GRAVIDI message sent to pt to remind that they are due for their surveillance vascular imaging.       Abiola Perez R.N.   [facility]

## 2025-06-09 DIAGNOSIS — R30.0 DYSURIA: Primary | ICD-10-CM

## 2025-06-11 ENCOUNTER — HOSPITAL ENCOUNTER (OUTPATIENT)
Dept: LAB | Facility: MEDICAL CENTER | Age: 35
End: 2025-06-11
Attending: NURSE PRACTITIONER
Payer: MEDICAID

## 2025-06-11 DIAGNOSIS — R30.0 DYSURIA: ICD-10-CM

## 2025-06-11 LAB
APPEARANCE UR: CLEAR
BACTERIA #/AREA URNS HPF: ABNORMAL /HPF
BILIRUB UR QL STRIP.AUTO: NEGATIVE
CASTS URNS QL MICRO: ABNORMAL /LPF (ref 0–2)
COLOR UR: YELLOW
EPITHELIAL CELLS 1715: ABNORMAL /HPF (ref 0–5)
GLUCOSE UR STRIP.AUTO-MCNC: NEGATIVE MG/DL
KETONES UR STRIP.AUTO-MCNC: NEGATIVE MG/DL
LEUKOCYTE ESTERASE UR QL STRIP.AUTO: ABNORMAL
MICRO URNS: ABNORMAL
NITRITE UR QL STRIP.AUTO: NEGATIVE
PH UR STRIP.AUTO: 6.5 [PH] (ref 5–8)
PROT UR QL STRIP: NEGATIVE MG/DL
RBC # URNS HPF: ABNORMAL /HPF (ref 0–2)
RBC UR QL AUTO: NEGATIVE
SP GR UR STRIP.AUTO: 1.01
UROBILINOGEN UR STRIP.AUTO-MCNC: 0.2 EU/DL
WBC #/AREA URNS HPF: ABNORMAL /HPF

## 2025-06-11 PROCEDURE — 81001 URINALYSIS AUTO W/SCOPE: CPT

## 2025-06-19 ENCOUNTER — DOCUMENTATION (OUTPATIENT)
Dept: VASCULAR LAB | Facility: MEDICAL CENTER | Age: 35
End: 2025-06-19
Payer: MEDICAID

## 2025-06-19 DIAGNOSIS — I65.22 LEFT CAROTID STENOSIS: ICD-10-CM

## 2025-06-19 DIAGNOSIS — I77.71 DISSECTION OF LEFT CAROTID ARTERY (HCC): ICD-10-CM

## 2025-06-19 DIAGNOSIS — G45.9 TIA (TRANSIENT ISCHEMIC ATTACK): ICD-10-CM

## 2025-06-19 NOTE — PROGRESS NOTES
Spoke with patient to relay below message from Dr. Bloch. Patient states understanding and is in agreement with plan. Patient states that she is currently traveling a lot for work so she will need to give us a call back when she is able to schedule.     Michael J Bloch, M.D.  Abiola Perez, FARHAD.; Maribel Miller, Med Ass't  Bc - update elizabeth  KD - let patient know that scans are stable. Needs f/u with apn    Maribel Miller, Medical Assistant   [facility]   Ph: [phone]  Fx: [phone]

## 2025-06-19 NOTE — PROGRESS NOTES
CTA head and neck stable from previous.  Will repeat CTA head and neck in 2 yrs - june 2027  Overdue for [facility] med. Will ask MA to scheduled.    Michael Bloch, MD  [facility]